# Patient Record
Sex: MALE | Race: WHITE | Employment: FULL TIME | URBAN - METROPOLITAN AREA
[De-identification: names, ages, dates, MRNs, and addresses within clinical notes are randomized per-mention and may not be internally consistent; named-entity substitution may affect disease eponyms.]

---
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
## 2019-01-09 ENCOUNTER — HOSPITAL ENCOUNTER (EMERGENCY)
Age: 19
Discharge: HOME | End: 2019-01-09
Payer: COMMERCIAL

## 2019-01-09 ENCOUNTER — APPOINTMENT (OUTPATIENT)
Dept: GENERAL RADIOLOGY | Age: 19
End: 2019-01-09
Payer: COMMERCIAL

## 2019-01-09 VITALS
OXYGEN SATURATION: 99 % | WEIGHT: 305 LBS | RESPIRATION RATE: 16 BRPM | HEIGHT: 67 IN | DIASTOLIC BLOOD PRESSURE: 62 MMHG | TEMPERATURE: 99.1 F | SYSTOLIC BLOOD PRESSURE: 146 MMHG | BODY MASS INDEX: 47.87 KG/M2 | HEART RATE: 88 BPM

## 2019-01-09 DIAGNOSIS — M79.644 PAIN OF FINGER OF RIGHT HAND: ICD-10-CM

## 2019-01-09 DIAGNOSIS — M77.9 TENDINITIS: ICD-10-CM

## 2019-01-09 DIAGNOSIS — S63.692A OTHER SPRAIN OF RIGHT MIDDLE FINGER, INITIAL ENCOUNTER: Primary | ICD-10-CM

## 2019-01-09 PROCEDURE — 99283 EMERGENCY DEPT VISIT LOW MDM: CPT

## 2019-01-09 PROCEDURE — 73130 X-RAY EXAM OF HAND: CPT

## 2019-01-09 PROCEDURE — 6370000000 HC RX 637 (ALT 250 FOR IP): Performed by: NURSE PRACTITIONER

## 2019-01-09 PROCEDURE — 6360000002 HC RX W HCPCS: Performed by: NURSE PRACTITIONER

## 2019-01-09 PROCEDURE — 96372 THER/PROPH/DIAG INJ SC/IM: CPT

## 2019-01-09 RX ORDER — PREDNISONE 20 MG/1
TABLET ORAL
Qty: 18 TABLET | Refills: 0 | Status: SHIPPED | OUTPATIENT
Start: 2019-01-09 | End: 2019-01-19

## 2019-01-09 RX ORDER — OXYCODONE HYDROCHLORIDE AND ACETAMINOPHEN 5; 325 MG/1; MG/1
1 TABLET ORAL ONCE
Status: COMPLETED | OUTPATIENT
Start: 2019-01-09 | End: 2019-01-09

## 2019-01-09 RX ORDER — KETOROLAC TROMETHAMINE 30 MG/ML
60 INJECTION, SOLUTION INTRAMUSCULAR; INTRAVENOUS ONCE
Status: COMPLETED | OUTPATIENT
Start: 2019-01-09 | End: 2019-01-09

## 2019-01-09 RX ORDER — CYCLOBENZAPRINE HCL 10 MG
10 TABLET ORAL 3 TIMES DAILY PRN
Qty: 15 TABLET | Refills: 0 | Status: SHIPPED | OUTPATIENT
Start: 2019-01-09 | End: 2019-01-19

## 2019-01-09 RX ORDER — KETOROLAC TROMETHAMINE 10 MG/1
10 TABLET, FILM COATED ORAL EVERY 6 HOURS PRN
Qty: 20 TABLET | Refills: 0 | Status: SHIPPED | OUTPATIENT
Start: 2019-01-09 | End: 2019-01-25

## 2019-01-09 RX ORDER — DEXAMETHASONE SODIUM PHOSPHATE 10 MG/ML
10 INJECTION, SOLUTION INTRAMUSCULAR; INTRAVENOUS ONCE
Status: COMPLETED | OUTPATIENT
Start: 2019-01-09 | End: 2019-01-09

## 2019-01-09 RX ADMIN — KETOROLAC TROMETHAMINE 60 MG: 30 INJECTION, SOLUTION INTRAMUSCULAR; INTRAVENOUS at 22:13

## 2019-01-09 RX ADMIN — DEXAMETHASONE SODIUM PHOSPHATE 10 MG: 10 INJECTION, SOLUTION INTRAMUSCULAR; INTRAVENOUS at 22:14

## 2019-01-09 RX ADMIN — OXYCODONE AND ACETAMINOPHEN 1 TABLET: 5; 325 TABLET ORAL at 23:12

## 2019-01-09 ASSESSMENT — PAIN DESCRIPTION - PAIN TYPE: TYPE: ACUTE PAIN

## 2019-01-09 ASSESSMENT — PAIN DESCRIPTION - DESCRIPTORS: DESCRIPTORS: SORE;BURNING

## 2019-01-09 ASSESSMENT — PAIN DESCRIPTION - FREQUENCY: FREQUENCY: CONTINUOUS

## 2019-01-09 ASSESSMENT — PAIN DESCRIPTION - ONSET: ONSET: ON-GOING

## 2019-01-09 ASSESSMENT — PAIN DESCRIPTION - LOCATION: LOCATION: FINGER (COMMENT WHICH ONE)

## 2019-01-09 ASSESSMENT — PAIN DESCRIPTION - ORIENTATION: ORIENTATION: RIGHT

## 2019-01-09 ASSESSMENT — PAIN DESCRIPTION - PROGRESSION: CLINICAL_PROGRESSION: NOT CHANGED

## 2019-01-09 ASSESSMENT — PAIN SCALES - GENERAL
PAINLEVEL_OUTOF10: 9
PAINLEVEL_OUTOF10: 9

## 2019-01-12 PROBLEM — M79.644 PAIN OF FINGER OF RIGHT HAND: Status: ACTIVE | Noted: 2019-01-12

## 2019-01-25 ENCOUNTER — HOSPITAL ENCOUNTER (EMERGENCY)
Age: 19
Discharge: HOME | End: 2019-01-25
Attending: EMERGENCY MEDICINE
Payer: COMMERCIAL

## 2019-01-25 ENCOUNTER — APPOINTMENT (OUTPATIENT)
Dept: CT IMAGING | Age: 19
End: 2019-01-25
Payer: COMMERCIAL

## 2019-01-25 VITALS
WEIGHT: 310 LBS | HEART RATE: 86 BPM | HEIGHT: 67 IN | DIASTOLIC BLOOD PRESSURE: 86 MMHG | SYSTOLIC BLOOD PRESSURE: 124 MMHG | RESPIRATION RATE: 18 BRPM | TEMPERATURE: 97.9 F | BODY MASS INDEX: 48.65 KG/M2 | OXYGEN SATURATION: 98 %

## 2019-01-25 DIAGNOSIS — F43.10 PTSD (POST-TRAUMATIC STRESS DISORDER): ICD-10-CM

## 2019-01-25 DIAGNOSIS — R10.13 ABDOMINAL PAIN, EPIGASTRIC: Primary | ICD-10-CM

## 2019-01-25 LAB
ALBUMIN SERPL-MCNC: 4.4 G/DL (ref 3.5–5.2)
ALP BLD-CCNC: 117 U/L (ref 40–129)
ALT SERPL-CCNC: 42 U/L (ref 0–40)
ANION GAP SERPL CALCULATED.3IONS-SCNC: 12 MMOL/L (ref 7–16)
AST SERPL-CCNC: 27 U/L (ref 0–39)
BASOPHILS ABSOLUTE: 0.08 E9/L (ref 0–0.2)
BASOPHILS RELATIVE PERCENT: 0.5 % (ref 0–2)
BILIRUB SERPL-MCNC: 0.3 MG/DL (ref 0–1.2)
BILIRUBIN URINE: NEGATIVE
BLOOD, URINE: NEGATIVE
BUN BLDV-MCNC: 15 MG/DL (ref 6–20)
CALCIUM SERPL-MCNC: 9.9 MG/DL (ref 8.6–10.2)
CHLORIDE BLD-SCNC: 101 MMOL/L (ref 98–107)
CLARITY: CLEAR
CO2: 28 MMOL/L (ref 22–29)
COLOR: YELLOW
CREAT SERPL-MCNC: 0.8 MG/DL (ref 0.7–1.2)
EOSINOPHILS ABSOLUTE: 0.09 E9/L (ref 0.05–0.5)
EOSINOPHILS RELATIVE PERCENT: 0.6 % (ref 0–6)
GFR AFRICAN AMERICAN: >60
GFR NON-AFRICAN AMERICAN: >60 ML/MIN/1.73
GLUCOSE BLD-MCNC: 93 MG/DL (ref 74–99)
GLUCOSE URINE: NEGATIVE MG/DL
HCT VFR BLD CALC: 47.2 % (ref 37–54)
HEMOGLOBIN: 15.8 G/DL (ref 12.5–16.5)
IMMATURE GRANULOCYTES #: 0.19 E9/L
IMMATURE GRANULOCYTES %: 1.2 % (ref 0–5)
KETONES, URINE: NEGATIVE MG/DL
LEUKOCYTE ESTERASE, URINE: NEGATIVE
LIPASE: 26 U/L (ref 13–60)
LYMPHOCYTES ABSOLUTE: 3.33 E9/L (ref 1.5–4)
LYMPHOCYTES RELATIVE PERCENT: 21.7 % (ref 20–42)
MCH RBC QN AUTO: 29.2 PG (ref 26–35)
MCHC RBC AUTO-ENTMCNC: 33.5 % (ref 32–34.5)
MCV RBC AUTO: 87.1 FL (ref 80–99.9)
MONOCYTES ABSOLUTE: 1.06 E9/L (ref 0.1–0.95)
MONOCYTES RELATIVE PERCENT: 6.9 % (ref 2–12)
NEUTROPHILS ABSOLUTE: 10.57 E9/L (ref 1.8–7.3)
NEUTROPHILS RELATIVE PERCENT: 69.1 % (ref 43–80)
NITRITE, URINE: NEGATIVE
PDW BLD-RTO: 13.2 FL (ref 11.5–15)
PH UA: 6 (ref 5–9)
PLATELET # BLD: 347 E9/L (ref 130–450)
PMV BLD AUTO: 9.6 FL (ref 7–12)
POTASSIUM REFLEX MAGNESIUM: 4.5 MMOL/L (ref 3.5–5)
PROTEIN UA: NEGATIVE MG/DL
RBC # BLD: 5.42 E12/L (ref 3.8–5.8)
SODIUM BLD-SCNC: 141 MMOL/L (ref 132–146)
SPECIFIC GRAVITY UA: 1.02 (ref 1–1.03)
TOTAL PROTEIN: 8.2 G/DL (ref 6.4–8.3)
UROBILINOGEN, URINE: 0.2 E.U./DL
WBC # BLD: 15.3 E9/L (ref 4.5–11.5)

## 2019-01-25 PROCEDURE — 2580000003 HC RX 258: Performed by: EMERGENCY MEDICINE

## 2019-01-25 PROCEDURE — 96375 TX/PRO/DX INJ NEW DRUG ADDON: CPT

## 2019-01-25 PROCEDURE — 99284 EMERGENCY DEPT VISIT MOD MDM: CPT

## 2019-01-25 PROCEDURE — 96374 THER/PROPH/DIAG INJ IV PUSH: CPT

## 2019-01-25 PROCEDURE — 6360000002 HC RX W HCPCS: Performed by: EMERGENCY MEDICINE

## 2019-01-25 PROCEDURE — 36415 COLL VENOUS BLD VENIPUNCTURE: CPT

## 2019-01-25 PROCEDURE — 74176 CT ABD & PELVIS W/O CONTRAST: CPT

## 2019-01-25 PROCEDURE — 83690 ASSAY OF LIPASE: CPT

## 2019-01-25 PROCEDURE — 6370000000 HC RX 637 (ALT 250 FOR IP): Performed by: EMERGENCY MEDICINE

## 2019-01-25 PROCEDURE — 81003 URINALYSIS AUTO W/O SCOPE: CPT

## 2019-01-25 PROCEDURE — 80053 COMPREHEN METABOLIC PANEL: CPT

## 2019-01-25 PROCEDURE — 85025 COMPLETE CBC W/AUTO DIFF WBC: CPT

## 2019-01-25 RX ORDER — KETOROLAC TROMETHAMINE 30 MG/ML
30 INJECTION, SOLUTION INTRAMUSCULAR; INTRAVENOUS ONCE
Status: COMPLETED | OUTPATIENT
Start: 2019-01-25 | End: 2019-01-25

## 2019-01-25 RX ORDER — PANTOPRAZOLE SODIUM 40 MG/1
40 TABLET, DELAYED RELEASE ORAL
Status: DISCONTINUED | OUTPATIENT
Start: 2019-01-26 | End: 2019-01-25

## 2019-01-25 RX ORDER — PROMETHAZINE HYDROCHLORIDE 25 MG/1
25 TABLET ORAL EVERY 6 HOURS PRN
Qty: 10 TABLET | Refills: 0 | Status: SHIPPED | OUTPATIENT
Start: 2019-01-25 | End: 2019-02-01

## 2019-01-25 RX ORDER — ONDANSETRON 2 MG/ML
4 INJECTION INTRAMUSCULAR; INTRAVENOUS EVERY 6 HOURS PRN
Status: DISCONTINUED | OUTPATIENT
Start: 2019-01-25 | End: 2019-01-25 | Stop reason: HOSPADM

## 2019-01-25 RX ORDER — 0.9 % SODIUM CHLORIDE 0.9 %
1000 INTRAVENOUS SOLUTION INTRAVENOUS ONCE
Status: COMPLETED | OUTPATIENT
Start: 2019-01-25 | End: 2019-01-25

## 2019-01-25 RX ORDER — PANTOPRAZOLE SODIUM 40 MG/1
40 TABLET, DELAYED RELEASE ORAL ONCE
Status: COMPLETED | OUTPATIENT
Start: 2019-01-25 | End: 2019-01-25

## 2019-01-25 RX ORDER — SUCRALFATE ORAL 1 G/10ML
1 SUSPENSION ORAL 4 TIMES DAILY
Qty: 1200 ML | Refills: 3 | Status: SHIPPED | OUTPATIENT
Start: 2019-01-25 | End: 2019-03-17 | Stop reason: ALTCHOICE

## 2019-01-25 RX ADMIN — SODIUM CHLORIDE 1000 ML: 9 INJECTION, SOLUTION INTRAVENOUS at 20:07

## 2019-01-25 RX ADMIN — LIDOCAINE HYDROCHLORIDE: 20 SOLUTION ORAL; TOPICAL at 19:00

## 2019-01-25 RX ADMIN — PANTOPRAZOLE SODIUM 40 MG: 40 TABLET, DELAYED RELEASE ORAL at 19:00

## 2019-01-25 RX ADMIN — KETOROLAC TROMETHAMINE 30 MG: 30 INJECTION, SOLUTION INTRAMUSCULAR; INTRAVENOUS at 20:07

## 2019-01-25 RX ADMIN — ONDANSETRON 4 MG: 2 INJECTION INTRAMUSCULAR; INTRAVENOUS at 20:07

## 2019-01-25 ASSESSMENT — PAIN SCALES - GENERAL
PAINLEVEL_OUTOF10: 7
PAINLEVEL_OUTOF10: 7

## 2019-01-25 ASSESSMENT — PAIN DESCRIPTION - LOCATION: LOCATION: ABDOMEN

## 2019-01-25 ASSESSMENT — PAIN DESCRIPTION - ONSET: ONSET: GRADUAL

## 2019-01-25 ASSESSMENT — PAIN DESCRIPTION - ORIENTATION: ORIENTATION: RIGHT;LEFT;UPPER;LOWER

## 2019-01-25 ASSESSMENT — PAIN DESCRIPTION - FREQUENCY: FREQUENCY: CONTINUOUS

## 2019-01-25 ASSESSMENT — PAIN DESCRIPTION - PROGRESSION: CLINICAL_PROGRESSION: GRADUALLY WORSENING

## 2019-03-17 ENCOUNTER — APPOINTMENT (OUTPATIENT)
Dept: CT IMAGING | Age: 19
End: 2019-03-17
Payer: COMMERCIAL

## 2019-03-17 ENCOUNTER — HOSPITAL ENCOUNTER (EMERGENCY)
Age: 19
Discharge: HOME | End: 2019-03-17
Payer: COMMERCIAL

## 2019-03-17 VITALS
HEIGHT: 67 IN | WEIGHT: 307 LBS | DIASTOLIC BLOOD PRESSURE: 96 MMHG | BODY MASS INDEX: 48.18 KG/M2 | RESPIRATION RATE: 16 BRPM | OXYGEN SATURATION: 99 % | TEMPERATURE: 98.1 F | SYSTOLIC BLOOD PRESSURE: 140 MMHG | HEART RATE: 100 BPM

## 2019-03-17 LAB
ALBUMIN SERPL-MCNC: 4.5 G/DL (ref 3.5–5.2)
ALP BLD-CCNC: 115 U/L (ref 40–129)
ALT SERPL-CCNC: 47 U/L (ref 0–40)
ANION GAP SERPL CALCULATED.3IONS-SCNC: 14 MMOL/L (ref 7–16)
AST SERPL-CCNC: 30 U/L (ref 0–39)
BASOPHILS ABSOLUTE: 0.05 E9/L (ref 0–0.2)
BASOPHILS RELATIVE PERCENT: 0.4 % (ref 0–2)
BILIRUB SERPL-MCNC: 0.3 MG/DL (ref 0–1.2)
BILIRUBIN URINE: NEGATIVE
BLOOD, URINE: NEGATIVE
BUN BLDV-MCNC: 10 MG/DL (ref 6–20)
CALCIUM SERPL-MCNC: 9.5 MG/DL (ref 8.6–10.2)
CHLORIDE BLD-SCNC: 105 MMOL/L (ref 98–107)
CLARITY: CLEAR
CO2: 24 MMOL/L (ref 22–29)
COLOR: YELLOW
CREAT SERPL-MCNC: 0.9 MG/DL (ref 0.7–1.2)
EOSINOPHILS ABSOLUTE: 0.05 E9/L (ref 0.05–0.5)
EOSINOPHILS RELATIVE PERCENT: 0.4 % (ref 0–6)
GFR AFRICAN AMERICAN: >60
GFR NON-AFRICAN AMERICAN: >60 ML/MIN/1.73
GLUCOSE BLD-MCNC: 102 MG/DL (ref 74–99)
GLUCOSE URINE: NEGATIVE MG/DL
HCT VFR BLD CALC: 45.8 % (ref 37–54)
HEMOGLOBIN: 15.2 G/DL (ref 12.5–16.5)
IMMATURE GRANULOCYTES #: 0.15 E9/L
IMMATURE GRANULOCYTES %: 1.1 % (ref 0–5)
KETONES, URINE: NEGATIVE MG/DL
LACTIC ACID: 1.9 MMOL/L (ref 0.5–2.2)
LEUKOCYTE ESTERASE, URINE: NEGATIVE
LIPASE: 36 U/L (ref 13–60)
LYMPHOCYTES ABSOLUTE: 2.5 E9/L (ref 1.5–4)
LYMPHOCYTES RELATIVE PERCENT: 18.7 % (ref 20–42)
MCH RBC QN AUTO: 29.3 PG (ref 26–35)
MCHC RBC AUTO-ENTMCNC: 33.2 % (ref 32–34.5)
MCV RBC AUTO: 88.2 FL (ref 80–99.9)
MONOCYTES ABSOLUTE: 1.11 E9/L (ref 0.1–0.95)
MONOCYTES RELATIVE PERCENT: 8.3 % (ref 2–12)
NEUTROPHILS ABSOLUTE: 9.5 E9/L (ref 1.8–7.3)
NEUTROPHILS RELATIVE PERCENT: 71.1 % (ref 43–80)
NITRITE, URINE: NEGATIVE
PDW BLD-RTO: 13.6 FL (ref 11.5–15)
PH UA: 6 (ref 5–9)
PLATELET # BLD: 340 E9/L (ref 130–450)
PMV BLD AUTO: 9.9 FL (ref 7–12)
POTASSIUM SERPL-SCNC: 4 MMOL/L (ref 3.5–5)
PROTEIN UA: NEGATIVE MG/DL
RBC # BLD: 5.19 E12/L (ref 3.8–5.8)
SODIUM BLD-SCNC: 143 MMOL/L (ref 132–146)
SPECIFIC GRAVITY UA: 1.02 (ref 1–1.03)
TOTAL PROTEIN: 8 G/DL (ref 6.4–8.3)
UROBILINOGEN, URINE: 0.2 E.U./DL
WBC # BLD: 13.4 E9/L (ref 4.5–11.5)

## 2019-03-17 PROCEDURE — 36415 COLL VENOUS BLD VENIPUNCTURE: CPT

## 2019-03-17 PROCEDURE — 6360000002 HC RX W HCPCS: Performed by: NURSE PRACTITIONER

## 2019-03-17 PROCEDURE — 85025 COMPLETE CBC W/AUTO DIFF WBC: CPT

## 2019-03-17 PROCEDURE — 81003 URINALYSIS AUTO W/O SCOPE: CPT

## 2019-03-17 PROCEDURE — 6360000004 HC RX CONTRAST MEDICATION: Performed by: RADIOLOGY

## 2019-03-17 PROCEDURE — 74177 CT ABD & PELVIS W/CONTRAST: CPT

## 2019-03-17 PROCEDURE — 80053 COMPREHEN METABOLIC PANEL: CPT

## 2019-03-17 PROCEDURE — 96372 THER/PROPH/DIAG INJ SC/IM: CPT

## 2019-03-17 PROCEDURE — 83605 ASSAY OF LACTIC ACID: CPT

## 2019-03-17 PROCEDURE — 83690 ASSAY OF LIPASE: CPT

## 2019-03-17 PROCEDURE — 2580000003 HC RX 258: Performed by: RADIOLOGY

## 2019-03-17 PROCEDURE — 99284 EMERGENCY DEPT VISIT MOD MDM: CPT

## 2019-03-17 RX ORDER — ONDANSETRON 4 MG/1
4 TABLET, ORALLY DISINTEGRATING ORAL EVERY 8 HOURS PRN
Qty: 10 TABLET | Refills: 0 | Status: SHIPPED | OUTPATIENT
Start: 2019-03-17 | End: 2019-04-21

## 2019-03-17 RX ORDER — DICYCLOMINE HYDROCHLORIDE 10 MG/ML
20 INJECTION INTRAMUSCULAR ONCE
Status: COMPLETED | OUTPATIENT
Start: 2019-03-17 | End: 2019-03-17

## 2019-03-17 RX ORDER — SODIUM CHLORIDE 0.9 % (FLUSH) 0.9 %
10 SYRINGE (ML) INJECTION PRN
Status: DISCONTINUED | OUTPATIENT
Start: 2019-03-17 | End: 2019-03-17 | Stop reason: HOSPADM

## 2019-03-17 RX ORDER — DICYCLOMINE HYDROCHLORIDE 10 MG/1
20 CAPSULE ORAL
Qty: 15 CAPSULE | Refills: 0 | Status: SHIPPED | OUTPATIENT
Start: 2019-03-17 | End: 2019-04-21

## 2019-03-17 RX ADMIN — IOPAMIDOL 100 ML: 755 INJECTION, SOLUTION INTRAVENOUS at 19:56

## 2019-03-17 RX ADMIN — Medication 10 ML: at 19:56

## 2019-03-17 RX ADMIN — DICYCLOMINE HYDROCHLORIDE 20 MG: 20 INJECTION, SOLUTION INTRAMUSCULAR at 18:48

## 2019-03-17 ASSESSMENT — PAIN DESCRIPTION - FREQUENCY: FREQUENCY: INTERMITTENT

## 2019-03-17 ASSESSMENT — PAIN SCALES - GENERAL: PAINLEVEL_OUTOF10: 8

## 2019-03-17 ASSESSMENT — PAIN DESCRIPTION - ORIENTATION: ORIENTATION: UPPER

## 2019-03-17 ASSESSMENT — PAIN DESCRIPTION - LOCATION: LOCATION: ABDOMEN

## 2019-03-17 ASSESSMENT — PAIN - FUNCTIONAL ASSESSMENT: PAIN_FUNCTIONAL_ASSESSMENT: PREVENTS OR INTERFERES SOME ACTIVE ACTIVITIES AND ADLS

## 2019-03-17 ASSESSMENT — PAIN DESCRIPTION - PAIN TYPE: TYPE: ACUTE PAIN

## 2019-03-17 ASSESSMENT — PAIN DESCRIPTION - DESCRIPTORS: DESCRIPTORS: BURNING

## 2019-03-17 NOTE — ED PROVIDER NOTES
patent. · Neck:  Supple. No lymphadenopathy. · Respiratory: Lungs Clear to auscultation and breath sounds equal.  · CV:  Regular rate and rhythm. · GI:  General Appearance: normal.         Bowel sounds: normal bowel sounds. Distension:  None. Tenderness: mild tenderness is present in the entire abdomen, no rebound tenderness, no guarding, abdominal rigidity is absent. Liver: non-palpable and non-tender. Spleen:  non-palpable and non-tender. Pulsatile Mass: absent. Hernia:  no inguinal or femoral hernias noted. · Back: CVA Tenderness: No.  · Integument:  Normal turgor. Warm, dry, without visible rash, unless noted elsewhere. · Neurological:  Orientation age-appropriate. Motor functions intact.     Lab / Imaging Results   (All laboratory and radiology results have been personally reviewed by myself)  Labs:  Results for orders placed or performed during the hospital encounter of 03/17/19   Urinalysis   Result Value Ref Range    Color, UA Yellow Straw/Yellow    Clarity, UA Clear Clear    Glucose, Ur Negative Negative mg/dL    Bilirubin Urine Negative Negative    Ketones, Urine Negative Negative mg/dL    Specific Gravity, UA 1.025 1.005 - 1.030    Blood, Urine Negative Negative    pH, UA 6.0 5.0 - 9.0    Protein, UA Negative Negative mg/dL    Urobilinogen, Urine 0.2 <2.0 E.U./dL    Nitrite, Urine Negative Negative    Leukocyte Esterase, Urine Negative Negative   Comprehensive Metabolic Panel   Result Value Ref Range    Sodium 143 132 - 146 mmol/L    Potassium 4.0 3.5 - 5.0 mmol/L    Chloride 105 98 - 107 mmol/L    CO2 24 22 - 29 mmol/L    Anion Gap 14 7 - 16 mmol/L    Glucose 102 (H) 74 - 99 mg/dL    BUN 10 6 - 20 mg/dL    CREATININE 0.9 0.7 - 1.2 mg/dL    GFR Non-African American >60 >=60 mL/min/1.73    GFR African American >60     Calcium 9.5 8.6 - 10.2 mg/dL    Total Protein 8.0 6.4 - 8.3 g/dL    Alb 4.5 3.5 - for their symptoms with moderate improvement. Patient continues to be non-toxic on re-evaluation. Ct scan was negative for acute findings. Discussed this may be a viral event and use clear liquid diet and advance as tolerated in the 2 days. Abdominal examination benign on re evaluation. Findings were discussed with the patient and reasons to immediately return to the ED were articulated to them. They will follow-up with their PMD.    Counseling:   I have spoken with the mother and patient and discussed todays results, in addition to providing specific details for the plan of care and counseling regarding the diagnosis and prognosis and are agreeable with the plan. Assessment      1. Generalized abdominal cramping    2. Nausea      This patient's ED course included: a personal history and physicial examination, re-evaluation prior to disposition, multiple bedside re-evaluations, IV medications and complex medical decision making and emergency management  This patient has remained hemodynamically stable, improved and been closely monitored during their ED course. Plan   Discharge to home. Patient condition is good. New Medications     Discharge Medication List as of 3/17/2019  8:58 PM      START taking these medications    Details   ondansetron (ZOFRAN ODT) 4 MG disintegrating tablet Take 1 tablet by mouth every 8 hours as needed for Nausea or Vomiting, Disp-10 tablet, R-0Print      dicyclomine (BENTYL) 10 MG capsule Take 2 capsules by mouth 4 times daily (before meals and nightly), Disp-15 capsule, R-nonePrint           Electronically signed by Alto Bamberger, APRN - CNP   DD: 3/17/19  **This report was transcribed using voice recognition software. Every effort was made to ensure accuracy; however, inadvertent computerized transcription errors may be present.   END OF PROVIDER NOTE      Alto Bamberger, APRN - CNP  03/19/19 9901

## 2019-04-21 ENCOUNTER — HOSPITAL ENCOUNTER (EMERGENCY)
Age: 19
Discharge: HOME | End: 2019-04-21
Attending: EMERGENCY MEDICINE
Payer: COMMERCIAL

## 2019-04-21 VITALS
SYSTOLIC BLOOD PRESSURE: 134 MMHG | RESPIRATION RATE: 16 BRPM | OXYGEN SATURATION: 99 % | TEMPERATURE: 98.5 F | BODY MASS INDEX: 48.81 KG/M2 | HEIGHT: 67 IN | DIASTOLIC BLOOD PRESSURE: 82 MMHG | WEIGHT: 311 LBS | HEART RATE: 88 BPM

## 2019-04-21 DIAGNOSIS — J20.9 ACUTE BRONCHITIS, UNSPECIFIED ORGANISM: Primary | ICD-10-CM

## 2019-04-21 PROCEDURE — 99283 EMERGENCY DEPT VISIT LOW MDM: CPT

## 2019-04-21 RX ORDER — AZITHROMYCIN 250 MG/1
TABLET, FILM COATED ORAL
Qty: 1 PACKET | Refills: 0 | Status: SHIPPED | OUTPATIENT
Start: 2019-04-21 | End: 2019-05-01

## 2019-04-21 RX ORDER — BENZONATATE 100 MG/1
100 CAPSULE ORAL 3 TIMES DAILY PRN
Qty: 21 CAPSULE | Refills: 0 | Status: SHIPPED | OUTPATIENT
Start: 2019-04-21 | End: 2019-04-28

## 2019-10-03 ENCOUNTER — HOSPITAL ENCOUNTER (EMERGENCY)
Age: 19
Discharge: HOME | End: 2019-10-03
Payer: COMMERCIAL

## 2019-10-03 ENCOUNTER — APPOINTMENT (OUTPATIENT)
Dept: CT IMAGING | Age: 19
End: 2019-10-03
Payer: COMMERCIAL

## 2019-10-03 VITALS
RESPIRATION RATE: 18 BRPM | WEIGHT: 310 LBS | HEIGHT: 67 IN | HEART RATE: 88 BPM | SYSTOLIC BLOOD PRESSURE: 145 MMHG | BODY MASS INDEX: 48.65 KG/M2 | OXYGEN SATURATION: 97 % | DIASTOLIC BLOOD PRESSURE: 80 MMHG | TEMPERATURE: 98.2 F

## 2019-10-03 DIAGNOSIS — R51.9 ACUTE NONINTRACTABLE HEADACHE, UNSPECIFIED HEADACHE TYPE: ICD-10-CM

## 2019-10-03 DIAGNOSIS — S09.90XA CLOSED HEAD INJURY, INITIAL ENCOUNTER: Primary | ICD-10-CM

## 2019-10-03 PROCEDURE — 99284 EMERGENCY DEPT VISIT MOD MDM: CPT

## 2019-10-03 PROCEDURE — 70450 CT HEAD/BRAIN W/O DYE: CPT

## 2019-10-03 PROCEDURE — 6370000000 HC RX 637 (ALT 250 FOR IP): Performed by: PHYSICIAN ASSISTANT

## 2019-10-03 PROCEDURE — 96372 THER/PROPH/DIAG INJ SC/IM: CPT

## 2019-10-03 PROCEDURE — 6360000002 HC RX W HCPCS: Performed by: PHYSICIAN ASSISTANT

## 2019-10-03 RX ORDER — KETOROLAC TROMETHAMINE 30 MG/ML
30 INJECTION, SOLUTION INTRAMUSCULAR; INTRAVENOUS ONCE
Status: COMPLETED | OUTPATIENT
Start: 2019-10-03 | End: 2019-10-03

## 2019-10-03 RX ORDER — ACETAMINOPHEN 500 MG
1000 TABLET ORAL ONCE
Status: COMPLETED | OUTPATIENT
Start: 2019-10-03 | End: 2019-10-03

## 2019-10-03 RX ORDER — BUTALBITAL, ASPIRIN, AND CAFFEINE 325; 50; 40 MG/1; MG/1; MG/1
1 CAPSULE ORAL EVERY 6 HOURS PRN
Qty: 28 CAPSULE | Refills: 0 | Status: SHIPPED | OUTPATIENT
Start: 2019-10-03 | End: 2020-02-22

## 2019-10-03 RX ADMIN — KETOROLAC TROMETHAMINE 30 MG: 30 INJECTION, SOLUTION INTRAMUSCULAR; INTRAVENOUS at 12:47

## 2019-10-03 RX ADMIN — ACETAMINOPHEN 1000 MG: 500 TABLET ORAL at 12:47

## 2019-10-03 ASSESSMENT — PAIN SCALES - GENERAL
PAINLEVEL_OUTOF10: 7
PAINLEVEL_OUTOF10: 7

## 2019-10-03 ASSESSMENT — PAIN DESCRIPTION - FREQUENCY: FREQUENCY: CONTINUOUS

## 2019-10-03 ASSESSMENT — PAIN DESCRIPTION - DESCRIPTORS: DESCRIPTORS: ACHING;CONSTANT

## 2019-10-03 ASSESSMENT — PAIN DESCRIPTION - LOCATION: LOCATION: HEAD

## 2019-12-29 ENCOUNTER — HOSPITAL ENCOUNTER (EMERGENCY)
Age: 19
Discharge: HOME | End: 2019-12-29
Payer: COMMERCIAL

## 2019-12-29 ENCOUNTER — APPOINTMENT (OUTPATIENT)
Dept: GENERAL RADIOLOGY | Age: 19
End: 2019-12-29
Payer: COMMERCIAL

## 2019-12-29 VITALS
OXYGEN SATURATION: 96 % | HEART RATE: 94 BPM | HEIGHT: 67 IN | RESPIRATION RATE: 18 BRPM | WEIGHT: 315 LBS | DIASTOLIC BLOOD PRESSURE: 78 MMHG | TEMPERATURE: 99.1 F | SYSTOLIC BLOOD PRESSURE: 156 MMHG | BODY MASS INDEX: 49.44 KG/M2

## 2019-12-29 PROCEDURE — 73130 X-RAY EXAM OF HAND: CPT

## 2019-12-29 PROCEDURE — 6370000000 HC RX 637 (ALT 250 FOR IP): Performed by: NURSE PRACTITIONER

## 2019-12-29 PROCEDURE — 99283 EMERGENCY DEPT VISIT LOW MDM: CPT

## 2019-12-29 RX ORDER — IBUPROFEN 400 MG/1
400 TABLET ORAL ONCE
Status: COMPLETED | OUTPATIENT
Start: 2019-12-29 | End: 2019-12-29

## 2019-12-29 RX ADMIN — IBUPROFEN 400 MG: 400 TABLET, FILM COATED ORAL at 21:33

## 2019-12-29 ASSESSMENT — PAIN DESCRIPTION - LOCATION: LOCATION: HAND

## 2019-12-29 ASSESSMENT — PAIN DESCRIPTION - ORIENTATION: ORIENTATION: RIGHT

## 2019-12-29 ASSESSMENT — PAIN SCALES - GENERAL
PAINLEVEL_OUTOF10: 6
PAINLEVEL_OUTOF10: 6

## 2019-12-29 ASSESSMENT — PAIN DESCRIPTION - PAIN TYPE: TYPE: ACUTE PAIN

## 2019-12-30 NOTE — ED PROVIDER NOTES
deficit: none. Capillary refill: normal.  Fingers:  all            Tenderness:  none. Swelling: None. Deformity: no.              ROM: full range of motion. Skin:  no erythema, rash or wounds noted. Wrist:               Tenderness:  none. Swelling: None. Deformity: no.             ROM: full range of motion. Skin:  no erythema, rash or wounds noted. Lymphatics: No lymphangitis or adenopathy noted. Neurological:  Oriented. Motor functions intact. t. Lab / Imaging Results   (All laboratory and radiology results have been personally reviewed by myself)  Labs:  No results found for this visit on 12/29/19. Imaging: All Radiology results interpreted by Radiologist unless otherwise noted. XR HAND RIGHT (MIN 3 VIEWS)   Final Result   No acute findings. ED Course / Medical Decision Making     Medications   ibuprofen (ADVIL;MOTRIN) tablet 400 mg (400 mg Oral Given 12/29/19 2133)          MDM:    Films were obtained and are negative for fracture. Plan is subsequently for symptom control, limited use and  immobilization with appropriate outpatient follow-up. Counseling: The emergency provider has spoken with the patient and discussed todays results, in addition to providing specific details for the plan of care and counseling regarding the diagnosis and prognosis. Questions are answered at this time and they are agreeable with the plan. Assessment      1. Contusion of right hand, initial encounter      Plan   Discharge to home  Patient condition is good    New Medications     Discharge Medication List as of 12/29/2019 10:02 PM        Electronically signed by CATIE Ruelas - CNP   DD: 12/29/19  **This report was transcribed using voice recognition software. Every effort was made to ensure accuracy; however, inadvertent computerized transcription errors may be present.   END OF ED PROVIDER NOTE     CATIE Rush - CNP  12/29/19 7692

## 2020-01-24 ENCOUNTER — HOSPITAL ENCOUNTER (EMERGENCY)
Age: 20
Discharge: HOME | End: 2020-01-24
Attending: EMERGENCY MEDICINE
Payer: COMMERCIAL

## 2020-01-24 ENCOUNTER — APPOINTMENT (OUTPATIENT)
Dept: GENERAL RADIOLOGY | Age: 20
End: 2020-01-24
Payer: COMMERCIAL

## 2020-01-24 VITALS
WEIGHT: 315 LBS | TEMPERATURE: 99 F | DIASTOLIC BLOOD PRESSURE: 80 MMHG | HEART RATE: 99 BPM | OXYGEN SATURATION: 99 % | RESPIRATION RATE: 18 BRPM | BODY MASS INDEX: 49.44 KG/M2 | HEIGHT: 67 IN | SYSTOLIC BLOOD PRESSURE: 110 MMHG

## 2020-01-24 LAB
ANION GAP SERPL CALCULATED.3IONS-SCNC: 12 MMOL/L (ref 7–16)
BASOPHILS ABSOLUTE: 0.04 E9/L (ref 0–0.2)
BASOPHILS RELATIVE PERCENT: 0.3 % (ref 0–2)
BUN BLDV-MCNC: 7 MG/DL (ref 6–20)
CALCIUM SERPL-MCNC: 9.1 MG/DL (ref 8.6–10.2)
CHLORIDE BLD-SCNC: 99 MMOL/L (ref 98–107)
CO2: 23 MMOL/L (ref 22–29)
CREAT SERPL-MCNC: 0.8 MG/DL (ref 0.7–1.2)
EOSINOPHILS ABSOLUTE: 0.04 E9/L (ref 0.05–0.5)
EOSINOPHILS RELATIVE PERCENT: 0.3 % (ref 0–6)
GFR AFRICAN AMERICAN: >60
GFR NON-AFRICAN AMERICAN: >60 ML/MIN/1.73
GLUCOSE BLD-MCNC: 99 MG/DL (ref 74–99)
HCT VFR BLD CALC: 47.2 % (ref 37–54)
HEMOGLOBIN: 15 G/DL (ref 12.5–16.5)
IMMATURE GRANULOCYTES #: 0.1 E9/L
IMMATURE GRANULOCYTES %: 0.7 % (ref 0–5)
INFLUENZA A BY PCR: NOT DETECTED
INFLUENZA B BY PCR: NOT DETECTED
LACTIC ACID: 1 MMOL/L (ref 0.5–2.2)
LYMPHOCYTES ABSOLUTE: 1.49 E9/L (ref 1.5–4)
LYMPHOCYTES RELATIVE PERCENT: 10.2 % (ref 20–42)
MCH RBC QN AUTO: 28.2 PG (ref 26–35)
MCHC RBC AUTO-ENTMCNC: 31.8 % (ref 32–34.5)
MCV RBC AUTO: 88.9 FL (ref 80–99.9)
MONOCYTES ABSOLUTE: 1.4 E9/L (ref 0.1–0.95)
MONOCYTES RELATIVE PERCENT: 9.5 % (ref 2–12)
NEUTROPHILS ABSOLUTE: 11.59 E9/L (ref 1.8–7.3)
NEUTROPHILS RELATIVE PERCENT: 79 % (ref 43–80)
PDW BLD-RTO: 13.2 FL (ref 11.5–15)
PLATELET # BLD: 248 E9/L (ref 130–450)
PMV BLD AUTO: 10.1 FL (ref 7–12)
POTASSIUM REFLEX MAGNESIUM: 3.6 MMOL/L (ref 3.5–5)
RBC # BLD: 5.31 E12/L (ref 3.8–5.8)
SODIUM BLD-SCNC: 134 MMOL/L (ref 132–146)
WBC # BLD: 14.7 E9/L (ref 4.5–11.5)

## 2020-01-24 PROCEDURE — 71046 X-RAY EXAM CHEST 2 VIEWS: CPT

## 2020-01-24 PROCEDURE — 36415 COLL VENOUS BLD VENIPUNCTURE: CPT

## 2020-01-24 PROCEDURE — 85025 COMPLETE CBC W/AUTO DIFF WBC: CPT

## 2020-01-24 PROCEDURE — 99283 EMERGENCY DEPT VISIT LOW MDM: CPT

## 2020-01-24 PROCEDURE — 83605 ASSAY OF LACTIC ACID: CPT

## 2020-01-24 PROCEDURE — 80048 BASIC METABOLIC PNL TOTAL CA: CPT

## 2020-01-24 PROCEDURE — 87502 INFLUENZA DNA AMP PROBE: CPT

## 2020-01-24 PROCEDURE — 6370000000 HC RX 637 (ALT 250 FOR IP): Performed by: EMERGENCY MEDICINE

## 2020-01-24 RX ORDER — DOXYCYCLINE HYCLATE 100 MG/1
100 CAPSULE ORAL ONCE
Status: COMPLETED | OUTPATIENT
Start: 2020-01-24 | End: 2020-01-24

## 2020-01-24 RX ORDER — 0.9 % SODIUM CHLORIDE 0.9 %
1000 INTRAVENOUS SOLUTION INTRAVENOUS ONCE
Status: DISCONTINUED | OUTPATIENT
Start: 2020-01-24 | End: 2020-01-24

## 2020-01-24 RX ORDER — DOXYCYCLINE HYCLATE 100 MG
100 TABLET ORAL 2 TIMES DAILY
Qty: 14 TABLET | Refills: 0 | Status: SHIPPED | OUTPATIENT
Start: 2020-01-24 | End: 2020-01-31

## 2020-01-24 RX ADMIN — DOXYCYCLINE HYCLATE 100 MG: 100 CAPSULE ORAL at 15:51

## 2020-01-24 NOTE — ED PROVIDER NOTES
HPI:  1/24/20, Time: 1:15 PM         Katrnia Kc is a 20 y.o. male presenting to the ED for cough, fever, and chills, beginning about 3 days ago. The complaint has been persistent, moderate in severity, and worsened by nothing. Patient presents to the emergency department by EMS for about 3 days of cough, congestion, fever, and chills. He has not identified any relieving remitting factors. He states that his highest temperature at home was 102.3 Fahrenheit orally. He did take some Tylenol prior to arrival.  He states that he has a history of asthma and his breathing seems slightly worse than usual but he is in no respiratory distress. He denies any chest pain at rest but he has little bit of chest discomfort when coughing. He denies abdominal pain, nausea, vomiting, diarrhea, or changes in urination. He did get a flu shot this year. Review of Systems:   Pertinent positives and negatives are stated within HPI, all other systems reviewed and are negative.          --------------------------------------------- PAST HISTORY ---------------------------------------------  Past Medical History:  has a past medical history of Arthritis and Asperger syndrome. Past Surgical History:  has a past surgical history that includes Tonsillectomy; Adenoidectomy; and [place] tooth extraction. Social History:  reports that he has never smoked. He has never used smokeless tobacco. He reports that he does not drink alcohol or use drugs. Family History: family history is not on file. The patients home medications have been reviewed. Allergies: Patient has no known allergies.     -------------------------------------------------- RESULTS -------------------------------------------------  All laboratory and radiology results have been personally reviewed by myself   LABS:  Results for orders placed or performed during the hospital encounter of 01/24/20   Rapid influenza A/B antigens   Result Value Ref Range Influenza A by PCR Not Detected Not Detected    Influenza B by PCR Not Detected Not Detected   CBC Auto Differential   Result Value Ref Range    WBC 14.7 (H) 4.5 - 11.5 E9/L    RBC 5.31 3.80 - 5.80 E12/L    Hemoglobin 15.0 12.5 - 16.5 g/dL    Hematocrit 47.2 37.0 - 54.0 %    MCV 88.9 80.0 - 99.9 fL    MCH 28.2 26.0 - 35.0 pg    MCHC 31.8 (L) 32.0 - 34.5 %    RDW 13.2 11.5 - 15.0 fL    Platelets 278 843 - 299 E9/L    MPV 10.1 7.0 - 12.0 fL    Neutrophils % 79.0 43.0 - 80.0 %    Immature Granulocytes % 0.7 0.0 - 5.0 %    Lymphocytes % 10.2 (L) 20.0 - 42.0 %    Monocytes % 9.5 2.0 - 12.0 %    Eosinophils % 0.3 0.0 - 6.0 %    Basophils % 0.3 0.0 - 2.0 %    Neutrophils Absolute 11.59 (H) 1.80 - 7.30 E9/L    Immature Granulocytes # 0.10 E9/L    Lymphocytes Absolute 1.49 (L) 1.50 - 4.00 E9/L    Monocytes Absolute 1.40 (H) 0.10 - 0.95 E9/L    Eosinophils Absolute 0.04 (L) 0.05 - 0.50 E9/L    Basophils Absolute 0.04 0.00 - 0.20 K6/Z   Basic Metabolic Panel w/ Reflex to MG   Result Value Ref Range    Sodium 134 132 - 146 mmol/L    Potassium reflex Magnesium 3.6 3.5 - 5.0 mmol/L    Chloride 99 98 - 107 mmol/L    CO2 23 22 - 29 mmol/L    Anion Gap 12 7 - 16 mmol/L    Glucose 99 74 - 99 mg/dL    BUN 7 6 - 20 mg/dL    CREATININE 0.8 0.7 - 1.2 mg/dL    GFR Non-African American >60 >=60 mL/min/1.73    GFR African American >60     Calcium 9.1 8.6 - 10.2 mg/dL   Lactic Acid, Plasma   Result Value Ref Range    Lactic Acid 1.0 0.5 - 2.2 mmol/L       RADIOLOGY:  Interpreted by Radiologist.  XR CHEST STANDARD (2 VW)   Final Result   Organizing left lower lobe pneumonia.          ------------------------- NURSING NOTES AND VITALS REVIEWED ---------------------------   The nursing notes within the ED encounter and vital signs as below have been reviewed.    /80   Pulse 99   Temp 99 °F (37.2 °C) (Oral)   Resp 18   Ht 5' 7\" (1.702 m)   Wt (!) 320 lb (145.2 kg)   SpO2 99%   BMI 50.12 kg/m²   Oxygen Saturation Interpretation: Normal      ---------------------------------------------------PHYSICAL EXAM--------------------------------------      Constitutional/General: Alert and oriented x3, well appearing, non toxic in NAD  Head: Normocephalic and atraumatic  Eyes: PERRL, EOMI  Mouth: Oropharynx clear, handling secretions, no trismus  Neck: Supple, full ROM,   Pulmonary: Lungs largely clear to auscultation with an extremely faint and expiratory wheeze. No rales, rhonchi, or stridor. Not in respiratory distress. Cardiovascular:  Regular rate and rhythm, no murmurs, gallops, or rubs. 2+ distal pulses  Abdomen: Soft, non tender, non distended,   Extremities: Moves all extremities x 4. Warm and well perfused  Skin: warm and dry without rash  Neurologic: GCS 15,  Psych: Normal Affect      ------------------------------ ED COURSE/MEDICAL DECISION MAKING----------------------  Medications   doxycycline hyclate (VIBRAMYCIN) capsule 100 mg (100 mg Oral Given 1/24/20 1551)         ED COURSE:  ED Course as of Jan 24 2000 Fri Jan 24, 2020 2000 Patient found to have a left lower lobe pneumonia on chest x-ray. His white blood cell count is elevated but the rest of his blood counts are stable and his electrolytes are normal.  His lactic acid is 1.0 and he is negative for influenza. Patient is sitting comfortably in bed and is completely unlabored breathing. Patient was started on doxycycline and he is instructed to follow-up with his family doctor. He voices understanding and is agreeable with this plan. [BM]      ED Course User Index  [BM] Verito Landis DO       Medical Decision Making:    Patient presents to the emergency department for about 3 days of cough, congestion, fever, and chills. He is found to be afebrile and saturating 99% on room air and his breathing is unlabored and comfortable. He does have very faint and expiratory wheezing with a history of asthma. He is concerned about having influenza.   Patient will be some blood work drawn as well as be checked for influenza and have a chest x-ray obtained. Counseling: The emergency provider has spoken with the patient and discussed todays results, in addition to providing specific details for the plan of care and counseling regarding the diagnosis and prognosis. Questions are answered at this time and they are agreeable with the plan.      --------------------------------- IMPRESSION AND DISPOSITION ---------------------------------    IMPRESSION  1.  Pneumonia of left lower lobe due to infectious organism [facility])        DISPOSITION  Disposition: Discharge to home  Patient condition is stable       Sienna Singh DO  01/24/20 2001

## 2020-02-22 ENCOUNTER — HOSPITAL ENCOUNTER (EMERGENCY)
Age: 20
Discharge: HOME | End: 2020-02-22
Attending: FAMILY MEDICINE
Payer: COMMERCIAL

## 2020-02-22 ENCOUNTER — APPOINTMENT (OUTPATIENT)
Dept: GENERAL RADIOLOGY | Age: 20
End: 2020-02-22
Payer: COMMERCIAL

## 2020-02-22 VITALS
TEMPERATURE: 97.9 F | BODY MASS INDEX: 48.65 KG/M2 | DIASTOLIC BLOOD PRESSURE: 76 MMHG | HEIGHT: 67 IN | WEIGHT: 310 LBS | OXYGEN SATURATION: 97 % | HEART RATE: 74 BPM | RESPIRATION RATE: 16 BRPM | SYSTOLIC BLOOD PRESSURE: 118 MMHG

## 2020-02-22 PROCEDURE — 73030 X-RAY EXAM OF SHOULDER: CPT

## 2020-02-22 PROCEDURE — 99283 EMERGENCY DEPT VISIT LOW MDM: CPT

## 2020-02-22 RX ORDER — ACETAMINOPHEN 500 MG
1000 TABLET ORAL EVERY 8 HOURS PRN
Qty: 50 TABLET | Refills: 0 | Status: SHIPPED | OUTPATIENT
Start: 2020-02-22 | End: 2020-06-20 | Stop reason: ALTCHOICE

## 2020-02-22 RX ORDER — IBUPROFEN 400 MG/1
400 TABLET ORAL EVERY 8 HOURS PRN
Qty: 12 TABLET | Refills: 0 | Status: SHIPPED | OUTPATIENT
Start: 2020-02-22 | End: 2020-03-03

## 2020-02-22 ASSESSMENT — PAIN SCALES - GENERAL: PAINLEVEL_OUTOF10: 8

## 2020-02-22 ASSESSMENT — PAIN DESCRIPTION - PAIN TYPE: TYPE: ACUTE PAIN

## 2020-02-22 ASSESSMENT — PAIN DESCRIPTION - DESCRIPTORS: DESCRIPTORS: SORE

## 2020-02-22 ASSESSMENT — PAIN DESCRIPTION - LOCATION: LOCATION: SHOULDER

## 2020-02-22 NOTE — LETTER
[address]  [address] [place] [id number]  Phone: [phone]               February 22, 2020    Patient: Irena Allen. YOB: 2000   Date of Visit: 2/22/2020       To Whom It May Concern:    Amber Henriquez was seen and treated in our emergency department on 2/22/2020. He may return to work 02/26/20.       Sincerely,               Signature:__________________________________

## 2020-02-22 NOTE — ED PROVIDER NOTES
Department of Emergency Medicine   ED  Provider Note  Admit Date/RoomTime: 2/22/2020 10:19 AM  ED Room: 06/06  Chief Complaint:   Shoulder Pain (was playing wii bowling about 2 days ago and right shoulder has been sore and in pain since then)    History of Present Illness   Source of history provided by:  patient. History/Exam Limitations: none. Irena Alvarez is a 20 y.o. old male who has a past medical history of:   Past Medical History:   Diagnosis Date    Arthritis     Asperger syndrome    presents to the emergency department by private vehicle and ambulatory, for Right shoulder pain which occured 2 day(s) prior to arrival. Cause of complaint: sports injury while bowling. There has been a history of no prior problems with this area in the past.  He is right handed. Since onset the symptoms have been moderate in degree. His pain is aggraveated by movement, use and palpation and relieved by rest.     ROS    Pertinent positives and negatives are stated within HPI, all other systems reviewed and are negative. Past Surgical History:  has a past surgical history that includes Tonsillectomy; Adenoidectomy; and [place] tooth extraction. Social History:  reports that he has never smoked. He has never used smokeless tobacco. He reports that he does not drink alcohol or use drugs. Family History: family history is not on file. Allergies: Patient has no known allergies. Physical Exam           ED Triage Vitals [02/22/20 1018]   BP Temp Temp Source Pulse Resp SpO2 Height Weight   -- 97.9 °F (36.6 °C) Oral 74 16 97 % -- --      Oxygen Saturation Interpretation: Normal    Constitutional:  Alert, development consistent with age. Neck:  Normal ROM. Supple. Non-tender. Shoulder:  Right anterior. Tenderness: mild              Swelling: None. Deformity: no.              ROM: diminished range with pain. Skin:  no erythema, rash or wounds noted.          Neurovascular: Motor deficit: none. Sensory deficit: none. Pulse deficit: none. Capillary refill: normal.    Elbow:              Tenderness:  none. Swelling: None. Deformity: no.              ROM: full range of motion. Skin:  no erythema, rash or wounds noted. Lymphatics: No lymphangitis or edema noted  Neurological:  Oriented. Motor functions intact. Lab / Imaging Results   (All laboratory and radiology results have been personally reviewed by myself)  Labs:  No results found for this visit on 02/22/20. Imaging: All Radiology results interpreted by Radiologist unless otherwise noted. XR SHOULDER RIGHT (MIN 2 VIEWS)    (Results Pending)     ED Course / Medical Decision Making   Medications - No data to display         Consult(s):   None    Procedure(s):   none    MDM:   Films were obtained based on moderate  suspicion for bony injury as per history/physical findings . Plan is subsequently for symptom control, limited use and  immobilization with appropriate outpatient follow-up. Counseling: The emergency provider has spoken with the patient and discussed todays results, in addition to providing specific details for the plan of care and counseling regarding the diagnosis and prognosis. Questions are answered at this time and they are agreeable with the plan. Assessment      1. Sprain of right shoulder, unspecified shoulder sprain type, initial encounter      Plan   Discharge to home  Patient condition is good    New Medications     New Prescriptions    ACETAMINOPHEN (TYLENOL) 500 MG TABLET    Take 2 tablets by mouth every 8 hours as needed for Pain    IBUPROFEN (IBU) 400 MG TABLET    Take 1 tablet by mouth every 8 hours as needed for Pain Take with full glass of water     Electronically signed by Eli Tran MD   DD: 2/22/20  **This report was transcribed using voice recognition software.  Every effort was made to

## 2020-02-25 ENCOUNTER — HOSPITAL ENCOUNTER (EMERGENCY)
Age: 20
Discharge: HOME | End: 2020-02-25
Payer: COMMERCIAL

## 2020-02-25 VITALS
WEIGHT: 315 LBS | OXYGEN SATURATION: 98 % | RESPIRATION RATE: 16 BRPM | HEART RATE: 88 BPM | SYSTOLIC BLOOD PRESSURE: 140 MMHG | BODY MASS INDEX: 49.44 KG/M2 | HEIGHT: 67 IN | TEMPERATURE: 98.1 F | DIASTOLIC BLOOD PRESSURE: 88 MMHG

## 2020-02-25 PROCEDURE — 96372 THER/PROPH/DIAG INJ SC/IM: CPT

## 2020-02-25 PROCEDURE — 99282 EMERGENCY DEPT VISIT SF MDM: CPT

## 2020-02-25 PROCEDURE — 6360000002 HC RX W HCPCS: Performed by: PHYSICIAN ASSISTANT

## 2020-02-25 PROCEDURE — 6370000000 HC RX 637 (ALT 250 FOR IP): Performed by: PHYSICIAN ASSISTANT

## 2020-02-25 RX ORDER — KETOROLAC TROMETHAMINE 30 MG/ML
30 INJECTION, SOLUTION INTRAMUSCULAR; INTRAVENOUS ONCE
Status: COMPLETED | OUTPATIENT
Start: 2020-02-25 | End: 2020-02-25

## 2020-02-25 RX ORDER — PREDNISONE 20 MG/1
60 TABLET ORAL ONCE
Status: COMPLETED | OUTPATIENT
Start: 2020-02-25 | End: 2020-02-25

## 2020-02-25 RX ORDER — LIDOCAINE 50 MG/G
1 PATCH TOPICAL DAILY
Qty: 10 PATCH | Refills: 0 | Status: SHIPPED | OUTPATIENT
Start: 2020-02-25 | End: 2020-03-03

## 2020-02-25 RX ORDER — PREDNISONE 10 MG/1
40 TABLET ORAL DAILY
Qty: 20 TABLET | Refills: 0 | Status: SHIPPED | OUTPATIENT
Start: 2020-02-25 | End: 2020-03-01

## 2020-02-25 RX ADMIN — PREDNISONE 60 MG: 20 TABLET ORAL at 19:12

## 2020-02-25 RX ADMIN — KETOROLAC TROMETHAMINE 30 MG: 30 INJECTION, SOLUTION INTRAMUSCULAR; INTRAVENOUS at 19:13

## 2020-02-25 ASSESSMENT — PAIN SCALES - GENERAL
PAINLEVEL_OUTOF10: 8
PAINLEVEL_OUTOF10: 8

## 2020-02-25 ASSESSMENT — PAIN DESCRIPTION - FREQUENCY: FREQUENCY: CONTINUOUS

## 2020-02-25 ASSESSMENT — PAIN DESCRIPTION - ORIENTATION: ORIENTATION: RIGHT

## 2020-02-25 ASSESSMENT — PAIN DESCRIPTION - LOCATION: LOCATION: SHOULDER

## 2020-02-25 ASSESSMENT — PAIN DESCRIPTION - PAIN TYPE: TYPE: ACUTE PAIN

## 2020-02-25 ASSESSMENT — PAIN DESCRIPTION - ONSET: ONSET: ON-GOING

## 2020-02-25 ASSESSMENT — PAIN DESCRIPTION - DESCRIPTORS: DESCRIPTORS: ACHING;DISCOMFORT

## 2020-02-25 ASSESSMENT — PAIN - FUNCTIONAL ASSESSMENT: PAIN_FUNCTIONAL_ASSESSMENT: PREVENTS OR INTERFERES SOME ACTIVE ACTIVITIES AND ADLS

## 2020-02-25 NOTE — ED PROVIDER NOTES
REVIEWED ---------------------------   The nursing notes within the ED encounter and vital signs as below have been reviewed. BP (!) 140/88   Pulse 88   Temp 98.1 °F (36.7 °C) (Oral)   Resp 16   Ht 5' 7\" (1.702 m)   Wt (!) 316 lb 2 oz (143.4 kg)   SpO2 98%   BMI 49.51 kg/m²   Oxygen Saturation Interpretation: Normal      ---------------------------------------------------PHYSICAL EXAM--------------------------------------      Constitutional/General: Alert and oriented x3, well appearing, non toxic in NAD  Head: Normocephalic and atraumatic  Eyes: PERRL, EOMI  Mouth: Oropharynx clear, handling secretions, no trismus  Neck: Supple, full ROM,   Pulmonary: Lungs clear to auscultation bilaterally, no wheezes, rales, or rhonchi. Not in respiratory distress  Cardiovascular:  Regular rate and rhythm, no murmurs, gallops, or rubs. 2+ distal pulses  Abdomen: Soft, non tender, non distended,   Extremities: Moves all extremities x 4. Warm and well perfused her to palpation of the proximal shoulder and volar aspect of the upper arm. No deficit of the muscle noted. He has normal sensation strength range of motion. Pulses are normal  Skin: warm and dry without rash  Neurologic: GCS 15,  Psych: Normal Affect      ------------------------------ ED COURSE/MEDICAL DECISION MAKING----------------------  Medications   predniSONE (DELTASONE) tablet 60 mg (60 mg Oral Given 2/25/20 1912)   ketorolac (TORADOL) injection 30 mg (30 mg Intramuscular Given 2/25/20 1913)         ED COURSE:       Medical Decision Making:    Comes in with complaint of right shoulder pain. Pain is worse with movement. He had a previous x-ray which was negative. Discussed with patient he needs to follow-up outpatient if he continues with pain they may need to obtain an MRI. He presently has a sling he was given instructions of exercise to prevent shoulder from freezing. Possible bicep tear or rotator cuff injury.   He was found to continue with anti-inflammatories he was placed on a prednisone burst and given lidocaine patch. Patient was given referral to orthopedics      Counseling: The emergency provider has spoken with the patient and discussed todays results, in addition to providing specific details for the plan of care and counseling regarding the diagnosis and prognosis. Questions are answered at this time and they are agreeable with the plan.      --------------------------------- IMPRESSION AND DISPOSITION ---------------------------------    IMPRESSION  1. Right arm pain        DISPOSITION  Disposition: Discharge to home  Patient condition is good      NOTE: This report was transcribed using voice recognition software.  Every effort was made to ensure accuracy; however, inadvertent computerized transcription errors may be present     Balbir James  02/25/20 1958

## 2020-03-03 ENCOUNTER — OFFICE VISIT (OUTPATIENT)
Dept: ORTHOPEDIC SURGERY | Age: 20
End: 2020-03-03
Payer: COMMERCIAL

## 2020-03-03 VITALS — WEIGHT: 315 LBS | HEIGHT: 67 IN | BODY MASS INDEX: 49.44 KG/M2

## 2020-03-03 PROCEDURE — G8427 DOCREV CUR MEDS BY ELIG CLIN: HCPCS | Performed by: ORTHOPAEDIC SURGERY

## 2020-03-03 PROCEDURE — 1036F TOBACCO NON-USER: CPT | Performed by: ORTHOPAEDIC SURGERY

## 2020-03-03 PROCEDURE — 99203 OFFICE O/P NEW LOW 30 MIN: CPT | Performed by: ORTHOPAEDIC SURGERY

## 2020-03-03 PROCEDURE — G8417 CALC BMI ABV UP PARAM F/U: HCPCS | Performed by: ORTHOPAEDIC SURGERY

## 2020-03-03 PROCEDURE — 20610 DRAIN/INJ JOINT/BURSA W/O US: CPT | Performed by: ORTHOPAEDIC SURGERY

## 2020-03-03 PROCEDURE — G8484 FLU IMMUNIZE NO ADMIN: HCPCS | Performed by: ORTHOPAEDIC SURGERY

## 2020-03-03 RX ORDER — SERTRALINE HYDROCHLORIDE 25 MG/1
25 TABLET, FILM COATED ORAL DAILY
COMMUNITY
Start: 2019-12-07

## 2020-03-03 RX ORDER — PREDNISONE 20 MG/1
TABLET ORAL
COMMUNITY
Start: 2019-12-16 | End: 2020-06-20 | Stop reason: ALTCHOICE

## 2020-03-03 RX ORDER — PIMOZIDE 2 MG/1
2 TABLET ORAL NIGHTLY
COMMUNITY

## 2020-03-03 NOTE — PROGRESS NOTES
Highest education level: Not on file   Occupational History    Not on file   Social Needs    Financial resource strain: Not on file    Food insecurity:     Worry: Not on file     Inability: Not on file    Transportation needs:     Medical: Not on file     Non-medical: Not on file   Tobacco Use    Smoking status: Never Smoker    Smokeless tobacco: Never Used   Substance and Sexual Activity    Alcohol use: Never     Frequency: Never    Drug use: Never    Sexual activity: Never   Lifestyle    Physical activity:     Days per week: Not on file     Minutes per session: Not on file    Stress: Not on file   Relationships    Social connections:     Talks on phone: Not on file     Gets together: Not on file     Attends Zoroastrianism service: Not on file     Active member of club or organization: Not on file     Attends meetings of clubs or organizations: Not on file     Relationship status: Not on file    Intimate partner violence:     Fear of current or ex partner: Not on file     Emotionally abused: Not on file     Physically abused: Not on file     Forced sexual activity: Not on file   Other Topics Concern    Not on file   Social History Narrative    ** Merged History Encounter **          No family history on file. REVIEW OF SYSTEMS:     General/Constitution:  (-)weight loss, (-)fever, (-)chills, (-)weakness. Skin: (-) rash,(-) psoriasis,(-) eczema, (-)skin cancer. Musculoskeletal: (-) fractures,  (-) dislocations,(-) collagen vascular disease, (-) fibromyalgia, (-) multiple sclerosis, (-) muscular dystrophy, (-) RSD,(-) joint pain (-)swelling, (-) joint pain,swelling. Neurologic: (-) epilepsy, (-)seizures,(-) brain tumor,(-) TIA, (-)stroke, (-)headaches, (-)Parkinson disease,(-) memory loss, (-) LOC. Cardiovascular: (-) Chest pain, (-) swelling in legs/feet, (-) SOB, (-) cramping in legs/feet with walking. Respiratory: (-) SOB, (-) Coughing, (-) night sweats.   GI: (-) nausea, (-) vomiting, (-) of the cervical spine without pain. There is no cervical tenderness to palpation. Shoulder Exam:  On evaluation of his bilaterally upper extremities, his right shoulder has no deformity. There is tenderness upon palpation of the lateral arm. There is not evidence of scapular dyskinesis. There is not muscle atrophy in shoulder girdle. The range of motion for the Right Shoulder is 140/35/l2 and for the Left shoulder is 150/45/t10. Right shoulder Motor strength is 5/5 in the supraspinatus, 5/5 internal rotation and 5/5 in external rotation, and Left shoulder motor strength 5/5 in supraspinatus, 5/5 in internal rotation, 5/5 in external rotation. Right shoulder:  positive Impingement , positive Johnson ,positive  Speeds,negative  Apprehension ,negative Chang Load Shift, negative Paul manuver, negative Cross arm test.     Left shoulder:  negative Impingement , negative Johnson ,negative  Speeds,negative  Apprehension ,negative Chang Load Shift, negative Paul manuver, negative Cross arm test.     XRAY:    The study is technically suboptimal. Evaluation is limited. Allowing for the position and technique the bones appear to be in   anatomic alignment. No gross  fracture is identified. No gross foreign body is identified. No other gross osseous abnormality is seen.             MRI:  n/a    Radiographic findings reviewed with patient    Impression:   Encounter Diagnosis   Name Primary?  Shoulder impingement, right Yes       Plan: Natural history and expected course discussed. Questions answered. Educational material distributed. Reduction in offending activity. Gentle ROM exercises  RICE therapy. NSAIDs per medication orders. Keep off work for 1 wk,  Will return 3/9/20    HEP     I will proceed with a cortisone injection in the Right shoulder. Verbal and written consent was obtained for the injection.   Skin was prepped with alcohol, 1 ml of Kenalog 40 mg and 9 ml of 0.25% Marcaine was injected to the posterior shoulder through the subacromial space of the Right Shoulder. The patient tolerated the injections well. I will see the patient back prn.

## 2020-03-04 RX ORDER — TRIAMCINOLONE ACETONIDE 40 MG/ML
40 INJECTION, SUSPENSION INTRA-ARTICULAR; INTRAMUSCULAR ONCE
Status: COMPLETED | OUTPATIENT
Start: 2020-03-04 | End: 2020-03-04

## 2020-03-04 RX ADMIN — TRIAMCINOLONE ACETONIDE 40 MG: 40 INJECTION, SUSPENSION INTRA-ARTICULAR; INTRAMUSCULAR at 09:08

## 2020-03-11 ENCOUNTER — HOSPITAL ENCOUNTER (EMERGENCY)
Age: 20
Discharge: HOME | End: 2020-03-11
Payer: COMMERCIAL

## 2020-03-11 VITALS
BODY MASS INDEX: 49.44 KG/M2 | HEIGHT: 67 IN | TEMPERATURE: 97.5 F | RESPIRATION RATE: 18 BRPM | OXYGEN SATURATION: 98 % | SYSTOLIC BLOOD PRESSURE: 182 MMHG | DIASTOLIC BLOOD PRESSURE: 88 MMHG | HEART RATE: 100 BPM | WEIGHT: 315 LBS

## 2020-03-11 PROCEDURE — 99282 EMERGENCY DEPT VISIT SF MDM: CPT

## 2020-03-11 RX ORDER — NAPROXEN 500 MG/1
500 TABLET ORAL 2 TIMES DAILY
Qty: 60 TABLET | Refills: 0 | Status: SHIPPED | OUTPATIENT
Start: 2020-03-11 | End: 2020-06-20 | Stop reason: ALTCHOICE

## 2020-03-11 ASSESSMENT — PAIN DESCRIPTION - PROGRESSION: CLINICAL_PROGRESSION: NOT CHANGED

## 2020-03-11 ASSESSMENT — PAIN SCALES - GENERAL: PAINLEVEL_OUTOF10: 8

## 2020-03-11 ASSESSMENT — PAIN DESCRIPTION - DESCRIPTORS: DESCRIPTORS: ACHING

## 2020-03-11 ASSESSMENT — PAIN DESCRIPTION - ORIENTATION: ORIENTATION: LEFT;LOWER

## 2020-03-11 ASSESSMENT — PAIN DESCRIPTION - FREQUENCY: FREQUENCY: CONTINUOUS

## 2020-03-11 ASSESSMENT — PAIN DESCRIPTION - LOCATION: LOCATION: ARM

## 2020-03-11 ASSESSMENT — PAIN DESCRIPTION - ONSET: ONSET: GRADUAL

## 2020-03-11 ASSESSMENT — PAIN DESCRIPTION - PAIN TYPE: TYPE: ACUTE PAIN

## 2020-03-11 ASSESSMENT — PAIN - FUNCTIONAL ASSESSMENT: PAIN_FUNCTIONAL_ASSESSMENT: ACTIVITIES ARE NOT PREVENTED

## 2020-03-12 NOTE — ED PROVIDER NOTES
They're educated on signs and symptoms that would require emergent return to the ED. RICE discussed. They were educated on newly prescribed medications and instructed on sling/splint use. Vitals stable and patient in no distress at discharge. Assessment      1. Tendinitis of finger      Plan   Discharge to home  Patient condition is good    New Medications     Discharge Medication List as of 3/11/2020  8:45 PM      START taking these medications    Details   naproxen (NAPROSYN) 500 MG tablet Take 1 tablet by mouth 2 times daily, Disp-60 tablet, R-0Print      diclofenac sodium (VOLTAREN) 1 % GEL Apply 2g to affected area 4x daily as needed, Disp-100 g, R-3, Print             Electronically signed by Tyson Lara PA-C   DD: 3/11/20  **This report was transcribed using voice recognition software. Every effort was made to ensure accuracy; however, inadvertent computerized transcription errors may be present.   END OF ED PROVIDER NOTE        Tyson Lara PA-C  03/12/20 [id number]

## 2020-06-02 ENCOUNTER — APPOINTMENT (OUTPATIENT)
Dept: CT IMAGING | Age: 20
End: 2020-06-02
Payer: COMMERCIAL

## 2020-06-02 ENCOUNTER — HOSPITAL ENCOUNTER (EMERGENCY)
Age: 20
Discharge: HOME | End: 2020-06-03
Payer: COMMERCIAL

## 2020-06-02 LAB
ALBUMIN SERPL-MCNC: 3.9 G/DL (ref 3.5–5.2)
ALP BLD-CCNC: 106 U/L (ref 40–129)
ALT SERPL-CCNC: 45 U/L (ref 0–40)
ANION GAP SERPL CALCULATED.3IONS-SCNC: 16 MMOL/L (ref 7–16)
AST SERPL-CCNC: 32 U/L (ref 0–39)
BASOPHILS ABSOLUTE: 0.05 E9/L (ref 0–0.2)
BASOPHILS RELATIVE PERCENT: 0.4 % (ref 0–2)
BILIRUB SERPL-MCNC: 0.2 MG/DL (ref 0–1.2)
BUN BLDV-MCNC: 11 MG/DL (ref 6–20)
CALCIUM SERPL-MCNC: 9 MG/DL (ref 8.6–10.2)
CHLORIDE BLD-SCNC: 104 MMOL/L (ref 98–107)
CO2: 21 MMOL/L (ref 22–29)
CREAT SERPL-MCNC: 0.8 MG/DL (ref 0.7–1.2)
EOSINOPHILS ABSOLUTE: 0.06 E9/L (ref 0.05–0.5)
EOSINOPHILS RELATIVE PERCENT: 0.5 % (ref 0–6)
GFR AFRICAN AMERICAN: >60
GFR NON-AFRICAN AMERICAN: >60 ML/MIN/1.73
GLUCOSE BLD-MCNC: 149 MG/DL (ref 74–99)
HCT VFR BLD CALC: 46.2 % (ref 37–54)
HEMOGLOBIN: 15.2 G/DL (ref 12.5–16.5)
IMMATURE GRANULOCYTES #: 0.18 E9/L
IMMATURE GRANULOCYTES %: 1.5 % (ref 0–5)
LYMPHOCYTES ABSOLUTE: 2.62 E9/L (ref 1.5–4)
LYMPHOCYTES RELATIVE PERCENT: 21.5 % (ref 20–42)
MCH RBC QN AUTO: 29 PG (ref 26–35)
MCHC RBC AUTO-ENTMCNC: 32.9 % (ref 32–34.5)
MCV RBC AUTO: 88.2 FL (ref 80–99.9)
MONOCYTES ABSOLUTE: 1.05 E9/L (ref 0.1–0.95)
MONOCYTES RELATIVE PERCENT: 8.6 % (ref 2–12)
NEUTROPHILS ABSOLUTE: 8.25 E9/L (ref 1.8–7.3)
NEUTROPHILS RELATIVE PERCENT: 67.5 % (ref 43–80)
PDW BLD-RTO: 13.6 FL (ref 11.5–15)
PLATELET # BLD: 329 E9/L (ref 130–450)
PMV BLD AUTO: 9.6 FL (ref 7–12)
POTASSIUM SERPL-SCNC: 4 MMOL/L (ref 3.5–5)
RBC # BLD: 5.24 E12/L (ref 3.8–5.8)
SODIUM BLD-SCNC: 141 MMOL/L (ref 132–146)
TOTAL PROTEIN: 7.1 G/DL (ref 6.4–8.3)
WBC # BLD: 12.2 E9/L (ref 4.5–11.5)

## 2020-06-02 PROCEDURE — 85025 COMPLETE CBC W/AUTO DIFF WBC: CPT

## 2020-06-02 PROCEDURE — 6360000002 HC RX W HCPCS: Performed by: NURSE PRACTITIONER

## 2020-06-02 PROCEDURE — 99284 EMERGENCY DEPT VISIT MOD MDM: CPT

## 2020-06-02 PROCEDURE — 96375 TX/PRO/DX INJ NEW DRUG ADDON: CPT

## 2020-06-02 PROCEDURE — 70450 CT HEAD/BRAIN W/O DYE: CPT

## 2020-06-02 PROCEDURE — 96374 THER/PROPH/DIAG INJ IV PUSH: CPT

## 2020-06-02 PROCEDURE — 6370000000 HC RX 637 (ALT 250 FOR IP): Performed by: NURSE PRACTITIONER

## 2020-06-02 PROCEDURE — 93005 ELECTROCARDIOGRAM TRACING: CPT | Performed by: NURSE PRACTITIONER

## 2020-06-02 PROCEDURE — 80053 COMPREHEN METABOLIC PANEL: CPT

## 2020-06-02 PROCEDURE — 2580000003 HC RX 258: Performed by: NURSE PRACTITIONER

## 2020-06-02 RX ORDER — PROCHLORPERAZINE EDISYLATE 5 MG/ML
10 INJECTION INTRAMUSCULAR; INTRAVENOUS ONCE
Status: COMPLETED | OUTPATIENT
Start: 2020-06-02 | End: 2020-06-02

## 2020-06-02 RX ORDER — 0.9 % SODIUM CHLORIDE 0.9 %
1000 INTRAVENOUS SOLUTION INTRAVENOUS ONCE
Status: COMPLETED | OUTPATIENT
Start: 2020-06-02 | End: 2020-06-02

## 2020-06-02 RX ORDER — DIPHENHYDRAMINE HYDROCHLORIDE 50 MG/ML
25 INJECTION INTRAMUSCULAR; INTRAVENOUS ONCE
Status: COMPLETED | OUTPATIENT
Start: 2020-06-02 | End: 2020-06-02

## 2020-06-02 RX ORDER — ACETAMINOPHEN 500 MG
1000 TABLET ORAL ONCE
Status: COMPLETED | OUTPATIENT
Start: 2020-06-02 | End: 2020-06-02

## 2020-06-02 RX ADMIN — SODIUM CHLORIDE 1000 ML: 9 INJECTION, SOLUTION INTRAVENOUS at 21:14

## 2020-06-02 RX ADMIN — PROCHLORPERAZINE EDISYLATE 10 MG: 5 INJECTION INTRAMUSCULAR; INTRAVENOUS at 21:14

## 2020-06-02 RX ADMIN — DIPHENHYDRAMINE HYDROCHLORIDE 25 MG: 50 INJECTION, SOLUTION INTRAMUSCULAR; INTRAVENOUS at 21:14

## 2020-06-02 RX ADMIN — ACETAMINOPHEN 1000 MG: 500 TABLET ORAL at 21:14

## 2020-06-02 ASSESSMENT — PAIN DESCRIPTION - DESCRIPTORS: DESCRIPTORS: HEADACHE

## 2020-06-02 ASSESSMENT — PAIN DESCRIPTION - LOCATION: LOCATION: HEAD

## 2020-06-02 ASSESSMENT — PAIN SCALES - GENERAL
PAINLEVEL_OUTOF10: 8
PAINLEVEL_OUTOF10: 8

## 2020-06-02 ASSESSMENT — PAIN DESCRIPTION - PAIN TYPE: TYPE: ACUTE PAIN

## 2020-06-03 VITALS
SYSTOLIC BLOOD PRESSURE: 113 MMHG | RESPIRATION RATE: 20 BRPM | TEMPERATURE: 96.5 F | HEART RATE: 84 BPM | BODY MASS INDEX: 49.44 KG/M2 | DIASTOLIC BLOOD PRESSURE: 65 MMHG | HEIGHT: 67 IN | WEIGHT: 315 LBS | OXYGEN SATURATION: 96 %

## 2020-06-03 LAB
EKG ATRIAL RATE: 106 BPM
EKG P AXIS: 36 DEGREES
EKG P-R INTERVAL: 138 MS
EKG Q-T INTERVAL: 358 MS
EKG QRS DURATION: 84 MS
EKG QTC CALCULATION (BAZETT): 475 MS
EKG R AXIS: 65 DEGREES
EKG T AXIS: 3 DEGREES
EKG VENTRICULAR RATE: 106 BPM

## 2020-06-03 PROCEDURE — 93010 ELECTROCARDIOGRAM REPORT: CPT | Performed by: INTERNAL MEDICINE

## 2020-06-03 ASSESSMENT — PAIN SCALES - GENERAL: PAINLEVEL_OUTOF10: 3

## 2020-06-20 ENCOUNTER — HOSPITAL ENCOUNTER (EMERGENCY)
Age: 20
Discharge: HOME | End: 2020-06-20
Attending: EMERGENCY MEDICINE
Payer: COMMERCIAL

## 2020-06-20 VITALS
OXYGEN SATURATION: 100 % | DIASTOLIC BLOOD PRESSURE: 93 MMHG | SYSTOLIC BLOOD PRESSURE: 156 MMHG | TEMPERATURE: 98.1 F | HEART RATE: 115 BPM | RESPIRATION RATE: 20 BRPM

## 2020-06-20 PROCEDURE — 96372 THER/PROPH/DIAG INJ SC/IM: CPT

## 2020-06-20 PROCEDURE — 6360000002 HC RX W HCPCS: Performed by: EMERGENCY MEDICINE

## 2020-06-20 PROCEDURE — G0381 LEV 2 HOSP TYPE B ED VISIT: HCPCS

## 2020-06-20 RX ORDER — KETOROLAC TROMETHAMINE 30 MG/ML
60 INJECTION, SOLUTION INTRAMUSCULAR; INTRAVENOUS ONCE
Status: COMPLETED | OUTPATIENT
Start: 2020-06-20 | End: 2020-06-20

## 2020-06-20 RX ORDER — KETOROLAC TROMETHAMINE 10 MG/1
TABLET, FILM COATED ORAL
Qty: 12 TABLET | Refills: 0 | Status: SHIPPED | OUTPATIENT
Start: 2020-06-20 | End: 2020-07-02 | Stop reason: ALTCHOICE

## 2020-06-20 RX ADMIN — KETOROLAC TROMETHAMINE 60 MG: 30 INJECTION, SOLUTION INTRAMUSCULAR at 19:30

## 2020-06-20 ASSESSMENT — ENCOUNTER SYMPTOMS
EYE PAIN: 0
EYE DISCHARGE: 0
COUGH: 0
SORE THROAT: 0
VOMITING: 0
SINUS PRESSURE: 0
WHEEZING: 0
BACK PAIN: 1
NAUSEA: 0
EYE REDNESS: 0
DIARRHEA: 0
SHORTNESS OF BREATH: 0
ABDOMINAL PAIN: 0

## 2020-06-20 ASSESSMENT — PAIN DESCRIPTION - PAIN TYPE: TYPE: ACUTE PAIN

## 2020-06-20 ASSESSMENT — PAIN DESCRIPTION - PROGRESSION
CLINICAL_PROGRESSION: GRADUALLY IMPROVING
CLINICAL_PROGRESSION: NOT CHANGED

## 2020-06-20 ASSESSMENT — PAIN DESCRIPTION - FREQUENCY: FREQUENCY: CONTINUOUS

## 2020-06-20 ASSESSMENT — PAIN SCALES - GENERAL
PAINLEVEL_OUTOF10: 9
PAINLEVEL_OUTOF10: 9

## 2020-06-20 ASSESSMENT — PAIN DESCRIPTION - LOCATION: LOCATION: BACK

## 2020-06-20 ASSESSMENT — PAIN DESCRIPTION - DESCRIPTORS: DESCRIPTORS: DISCOMFORT

## 2020-06-20 ASSESSMENT — PAIN - FUNCTIONAL ASSESSMENT: PAIN_FUNCTIONAL_ASSESSMENT: ACTIVITIES ARE NOT PREVENTED

## 2020-06-20 NOTE — ED PROVIDER NOTES
range of motion without pain, normal range of motion and neck supple. Trachea: Trachea and phonation normal.   Cardiovascular:      Rate and Rhythm: Normal rate and regular rhythm. Heart sounds: Normal heart sounds. No murmur. Pulmonary:      Effort: Pulmonary effort is normal. No respiratory distress. Breath sounds: Normal breath sounds. No transmitted upper airway sounds. No decreased breath sounds, wheezing, rhonchi or rales. Abdominal:      General: Bowel sounds are normal.      Palpations: Abdomen is soft. Tenderness: There is no abdominal tenderness. There is no guarding or rebound. Skin:     General: Skin is warm and dry. Neurological:      Mental Status: He is alert and oriented to person, place, and time. Cranial Nerves: No cranial nerve deficit. Coordination: Coordination normal.         Procedures    MDM            --------------------------------------------- PAST HISTORY ---------------------------------------------  Past Medical History:  has a past medical history of Arthritis and Asperger syndrome. Past Surgical History:  has a past surgical history that includes Tonsillectomy; Adenoidectomy; and [place] tooth extraction. Social History:  reports that he has never smoked. He has never used smokeless tobacco. He reports that he does not drink alcohol or use drugs. Family History: family history is not on file. The patients home medications have been reviewed. Allergies: Patient has no known allergies. -------------------------------------------------- RESULTS -------------------------------------------------  No results found for this visit on 06/20/20. No orders to display       ------------------------- NURSING NOTES AND VITALS REVIEWED ---------------------------   The nursing notes within the ED encounter and vital signs as below have been reviewed.    BP (!) 156/93   Pulse 115   Temp 98.1 °F (36.7 °C) (Oral)   Resp 20   SpO2 100%

## 2020-06-25 ENCOUNTER — TELEPHONE (OUTPATIENT)
Dept: SLEEP CENTER | Age: 20
End: 2020-06-25

## 2020-07-02 ENCOUNTER — HOSPITAL ENCOUNTER (EMERGENCY)
Age: 20
Discharge: HOME | End: 2020-07-02
Attending: EMERGENCY MEDICINE
Payer: COMMERCIAL

## 2020-07-02 ENCOUNTER — APPOINTMENT (OUTPATIENT)
Dept: GENERAL RADIOLOGY | Age: 20
End: 2020-07-02
Payer: COMMERCIAL

## 2020-07-02 VITALS
HEART RATE: 112 BPM | DIASTOLIC BLOOD PRESSURE: 80 MMHG | SYSTOLIC BLOOD PRESSURE: 136 MMHG | WEIGHT: 315 LBS | OXYGEN SATURATION: 97 % | TEMPERATURE: 98 F | RESPIRATION RATE: 20 BRPM | BODY MASS INDEX: 54.19 KG/M2

## 2020-07-02 PROCEDURE — G0382 LEV 3 HOSP TYPE B ED VISIT: HCPCS

## 2020-07-02 PROCEDURE — 73560 X-RAY EXAM OF KNEE 1 OR 2: CPT

## 2020-07-02 RX ORDER — IBUPROFEN 600 MG/1
600 TABLET ORAL EVERY 8 HOURS PRN
Qty: 30 TABLET | Refills: 0 | OUTPATIENT
Start: 2020-07-02 | End: 2022-01-06

## 2020-07-02 ASSESSMENT — ENCOUNTER SYMPTOMS
EYE REDNESS: 0
WHEEZING: 0
EYE PAIN: 0
COUGH: 0
VOMITING: 0
SHORTNESS OF BREATH: 0
BACK PAIN: 0
SINUS PRESSURE: 0
ABDOMINAL PAIN: 0
DIARRHEA: 0
SORE THROAT: 0
NAUSEA: 0
EYE DISCHARGE: 0

## 2020-07-02 ASSESSMENT — PAIN DESCRIPTION - ORIENTATION: ORIENTATION: LEFT

## 2020-07-02 ASSESSMENT — PAIN DESCRIPTION - PROGRESSION
CLINICAL_PROGRESSION: NOT CHANGED
CLINICAL_PROGRESSION: NOT CHANGED

## 2020-07-02 ASSESSMENT — PAIN DESCRIPTION - FREQUENCY: FREQUENCY: CONTINUOUS

## 2020-07-02 ASSESSMENT — PAIN DESCRIPTION - PAIN TYPE: TYPE: ACUTE PAIN

## 2020-07-02 ASSESSMENT — PAIN SCALES - GENERAL: PAINLEVEL_OUTOF10: 7

## 2020-07-02 ASSESSMENT — PAIN DESCRIPTION - DESCRIPTORS: DESCRIPTORS: BURNING;PATIENT UNABLE TO DESCRIBE

## 2020-07-02 ASSESSMENT — PAIN DESCRIPTION - ONSET: ONSET: SUDDEN

## 2020-07-02 ASSESSMENT — PAIN DESCRIPTION - LOCATION: LOCATION: KNEE;LEG

## 2020-07-02 NOTE — ED PROVIDER NOTES
The history is provided by the patient. Knee Problem   Location:  Knee  Time since incident:  2 days  Injury: yes    Mechanism of injury: fall    Fall:     Fall occurred: fell off a ladder in pool. Knee location:  L knee  Pain details:     Quality:  Aching    Severity:  Moderate  Associated symptoms: no back pain and no fever         Review of Systems   Constitutional: Negative for chills and fever. HENT: Negative for ear pain, sinus pressure and sore throat. Eyes: Negative for pain, discharge and redness. Respiratory: Negative for cough, shortness of breath and wheezing. Cardiovascular: Negative for chest pain. Gastrointestinal: Negative for abdominal pain, diarrhea, nausea and vomiting. Genitourinary: Negative for dysuria and frequency. Musculoskeletal: Negative for arthralgias and back pain. Skin: Negative for rash and wound. Neurological: Negative for weakness and headaches. Hematological: Negative for adenopathy. All other systems reviewed and are negative. Physical Exam  Vitals signs and nursing note reviewed. Constitutional:       Appearance: He is well-developed. HENT:      Head: Normocephalic and atraumatic. Jaw: No trismus. Right Ear: Hearing, tympanic membrane, ear canal and external ear normal.      Left Ear: Hearing, tympanic membrane, ear canal and external ear normal.      Nose: Nose normal.      Right Sinus: No maxillary sinus tenderness or frontal sinus tenderness. Left Sinus: No maxillary sinus tenderness or frontal sinus tenderness. Mouth/Throat:      Pharynx: Uvula midline. No uvula swelling. Eyes:      General: Lids are normal.      Conjunctiva/sclera: Conjunctivae normal.      Pupils: Pupils are equal, round, and reactive to light. Neck:      Musculoskeletal: Normal range of motion and neck supple. Cardiovascular:      Rate and Rhythm: Normal rate and regular rhythm. Heart sounds: Normal heart sounds. No murmur.    Pulmonary: Effort: Pulmonary effort is normal. No respiratory distress. Breath sounds: Normal breath sounds. No wheezing or rales. Abdominal:      General: Bowel sounds are normal.      Palpations: Abdomen is soft. Abdomen is not rigid. Tenderness: There is no abdominal tenderness. There is no guarding or rebound. Musculoskeletal:      Left knee: He exhibits decreased range of motion and swelling. He exhibits no effusion. Tenderness found. Skin:     General: Skin is warm and dry. Findings: No abrasion or rash. Neurological:      Mental Status: He is alert and oriented to person, place, and time. GCS: GCS eye subscore is 4. GCS verbal subscore is 5. GCS motor subscore is 6. Cranial Nerves: No cranial nerve deficit. Sensory: No sensory deficit. Coordination: Coordination normal.      Gait: Gait normal.          Procedures     MDM          --------------------------------------------- PAST HISTORY ---------------------------------------------  Past Medical History:  has a past medical history of Arthritis, Asperger syndrome, Obesity, and Sleep apnea. Past Surgical History:  has a past surgical history that includes Tonsillectomy; Adenoidectomy; and [place] tooth extraction. Social History:  reports that he has never smoked. He has never used smokeless tobacco. He reports that he does not drink alcohol or use drugs. Family History: family history is not on file. The patients home medications have been reviewed. Allergies: Patient has no known allergies. -------------------------------------------------- RESULTS -------------------------------------------------  Labs:  No results found for this visit on 07/02/20. Radiology:  XR KNEE LEFT (1-2 VIEWS)   Final Result   Unremarkable radiographs left knee. NOTE: A number of tibial plateau fractures will be radiographically occult   without internal oblique view in the knee series.       Follow-up imaging recommended if pain persists or worsens following   conservative management. Xr Knee Left (1-2 Views)    Result Date: 7/2/2020  EXAMINATION: TWO XRAY VIEWS OF THE LEFT KNEE 7/2/2020 6:52 pm COMPARISON: None. HISTORY: Acute left knee pain following injury, fell off a ladder 2 days ago FINDINGS: Osseous structures of the left knee are intact and align normally. No retained radiopaque foreign body. Joint spaces appear well maintained. Unremarkable radiographs left knee. NOTE: A number of tibial plateau fractures will be radiographically occult without internal oblique view in the knee series. Follow-up imaging recommended if pain persists or worsens following conservative management.         ------------------------- NURSING NOTES AND VITALS REVIEWED ---------------------------  Date / Time Roomed:  7/2/2020  6:39 PM  ED Bed Assignment:  02/02    The nursing notes within the ED encounter and vital signs as below have been reviewed. /80   Pulse 112   Temp 98 °F (36.7 °C) Comment: forehead temp on arrival to facility  Resp 20   Wt (!) 346 lb (156.9 kg)   SpO2 97%   BMI 54.19 kg/m²   Oxygen Saturation Interpretation: Normal      ------------------------------------------ PROGRESS NOTES ------------------------------------------  I have spoken with the patient and discussed todays results, in addition to providing specific details for the plan of care and counseling regarding the diagnosis and prognosis. Their questions are answered at this time and they are agreeable with the plan. I discussed at length with them reasons for immediate return here for re evaluation. They will followup with primary care by calling their office tomorrow. --------------------------------- ADDITIONAL PROVIDER NOTES ---------------------------------  At this time the patient is without objective evidence of an acute process requiring hospitalization or inpatient management.   They have remained hemodynamically

## 2020-07-06 ENCOUNTER — TELEPHONE (OUTPATIENT)
Dept: ADMINISTRATIVE | Age: 20
End: 2020-07-06

## 2020-07-06 NOTE — TELEPHONE ENCOUNTER
Patient called in today reports being seen in ED on 7/2 for Sprain of left knee, unspecified ligament, initial encounter, is in a lot of pain states ibuprofen is no longer helping.  Best call back is [phone]

## 2021-07-07 ENCOUNTER — APPOINTMENT (OUTPATIENT)
Dept: GENERAL RADIOLOGY | Age: 21
End: 2021-07-07
Payer: COMMERCIAL

## 2021-07-07 ENCOUNTER — HOSPITAL ENCOUNTER (EMERGENCY)
Age: 21
Discharge: HOME | End: 2021-07-07
Payer: COMMERCIAL

## 2021-07-07 VITALS
SYSTOLIC BLOOD PRESSURE: 159 MMHG | RESPIRATION RATE: 18 BRPM | HEIGHT: 67 IN | DIASTOLIC BLOOD PRESSURE: 92 MMHG | HEART RATE: 92 BPM | OXYGEN SATURATION: 98 % | TEMPERATURE: 99.3 F | WEIGHT: 315 LBS | BODY MASS INDEX: 49.44 KG/M2

## 2021-07-07 DIAGNOSIS — S63.615A SPRAIN OF LEFT RING FINGER, UNSPECIFIED SITE OF DIGIT, INITIAL ENCOUNTER: Primary | ICD-10-CM

## 2021-07-07 PROCEDURE — 99283 EMERGENCY DEPT VISIT LOW MDM: CPT

## 2021-07-07 PROCEDURE — 73130 X-RAY EXAM OF HAND: CPT

## 2021-07-07 PROCEDURE — 6370000000 HC RX 637 (ALT 250 FOR IP): Performed by: PHYSICIAN ASSISTANT

## 2021-07-07 RX ORDER — NAPROXEN 500 MG/1
500 TABLET ORAL 2 TIMES DAILY WITH MEALS
Qty: 20 TABLET | Refills: 0 | Status: SHIPPED | OUTPATIENT
Start: 2021-07-07 | End: 2021-09-12

## 2021-07-07 RX ORDER — NAPROXEN 500 MG/1
500 TABLET ORAL ONCE
Status: COMPLETED | OUTPATIENT
Start: 2021-07-07 | End: 2021-07-07

## 2021-07-07 RX ADMIN — NAPROXEN 500 MG: 500 TABLET ORAL at 22:11

## 2021-07-07 ASSESSMENT — PAIN SCALES - GENERAL
PAINLEVEL_OUTOF10: 6
PAINLEVEL_OUTOF10: 6

## 2021-07-07 ASSESSMENT — PAIN DESCRIPTION - PAIN TYPE: TYPE: ACUTE PAIN

## 2021-07-07 ASSESSMENT — PAIN DESCRIPTION - ORIENTATION: ORIENTATION: LEFT

## 2021-07-07 ASSESSMENT — PAIN DESCRIPTION - LOCATION: LOCATION: FINGER (COMMENT WHICH ONE)

## 2021-07-08 NOTE — ED PROVIDER NOTES
[facility]    HPI:  7/7/21, Time: 8:52 PM EDT         Juan Lehman is a 24 y.o. male presenting to the ED for left ring finger injury, beginning 3 days ago. The complaint has been persistent, moderate in severity, and worsened by movement of left ring finger. Patient reports that he fell just prior to the onset of his symptoms. Is unsure of how he fell or hit his hand. Denies any previous injuries to this area. Has been afebrile without recent travel or sick contacts. Patient denies all other symptoms and injuries at this time. Review of Systems:   A complete review of systems was performed and pertinent positives and negatives are stated within HPI, all other systems reviewed and are negative.          --------------------------------------------- PAST HISTORY ---------------------------------------------  Past Medical History:  has a past medical history of Arthritis, Asperger syndrome, Obesity, and Sleep apnea. Past Surgical History:  has a past surgical history that includes Tonsillectomy; Adenoidectomy; and [place] tooth extraction. Social History:  reports that he has never smoked. He has never used smokeless tobacco. He reports that he does not drink alcohol and does not use drugs. Family History: family history is not on file. The patients home medications have been reviewed. Allergies: Patient has no known allergies. -------------------------------------------------- RESULTS -------------------------------------------------  All laboratory and radiology results have been personally reviewed by myself   LABS:  No results found for this visit on 07/07/21. RADIOLOGY:  Interpreted by Radiologist.  XR HAND LEFT (MIN 3 VIEWS)   Final Result   No acute osseous or soft tissue findings seen about the left hand at this   time. Normal alignment.       RECOMMENDATION:   In the setting of trauma, if there is persistent symptoms and physical exam   warrants a repeat radiograph in 10-14 days could be considered as occult   fractures may not be evident on initial imaging evaluation.             ------------------------- NURSING NOTES AND VITALS REVIEWED ---------------------------   The nursing notes within the ED encounter and vital signs as below have been reviewed. BP (!) 159/92   Pulse 92   Temp 99.3 °F (37.4 °C) (Infrared)   Resp 18   Ht 5' 7\" (1.702 m)   Wt (!) 320 lb (145.2 kg)   SpO2 98%   BMI 50.12 kg/m²   Oxygen Saturation Interpretation: Normal      ---------------------------------------------------PHYSICAL EXAM--------------------------------------      Constitutional/General: Alert and oriented x3, well appearing, non toxic in NAD  Head: Normocephalic and atraumatic  Eyes: PERRL, EOMI  Mouth: Oropharynx clear, handling secretions, no trismus  Neck: Supple, full ROM,   Pulmonary: Lungs clear to auscultation bilaterally, no wheezes, rales, or rhonchi. Not in respiratory distress  Cardiovascular:  Regular rate and rhythm, no murmurs, gallops, or rubs. 2+ distal pulses  Abdomen: Soft, non tender, non distended,   Extremities: Moves all extremities x 4. Warm and well perfused. Tenderness to palpation diffusely throughout the left ring finger. He does have slightly limited range of motion of this digit secondary to pain. No swelling, ecchymosis, or erythema noted. Skin: warm and dry without rash  Neurologic: GCS 15,  Psych: Normal Affect      ------------------------------ ED COURSE/MEDICAL DECISION MAKING----------------------  Medications   naproxen (NAPROSYN) tablet 500 mg (has no administration in time range)         ED COURSE:  ED Course as of Jul 07 2204   Wed Jul 07, 2021 2156 Reassessed patient. Remained stable neurovascularly intact. Discussed results with patient and his wife. At this time there is no acute pathology noted on imaging studies. Will po tape fingers together to add support. Provide pain medication.   Recommend close follow-up with PCP for recheck return the emergency department with any new or worsening symptoms. Patient voiced understanding is agreeable to the above treatment plan. [MS]      ED Course User Index  [MS] Balbir Hodges       Medical Decision Making:    See ED course above. Counseling: The emergency provider has spoken with the patient and spouse/SO and discussed todays results, in addition to providing specific details for the plan of care and counseling regarding the diagnosis and prognosis. Questions are answered at this time and they are agreeable with the plan.      --------------------------------- IMPRESSION AND DISPOSITION ---------------------------------    IMPRESSION  1. Sprain of left ring finger, unspecified site of digit, initial encounter        DISPOSITION  Disposition: Discharge to home  Patient condition is stable      NOTE: This report was transcribed using voice recognition software. Every effort was made to ensure accuracy; however, inadvertent computerized transcription errors may be present        Balbir Hodges  07/07/21 8204      ATTENDING PROVIDER ATTESTATION:     Supervising Physician, on-site, available for consultation, non-participatory in the evaluation or care of this patient.          [facility],   07/08/21 4923

## 2021-09-12 ENCOUNTER — HOSPITAL ENCOUNTER (EMERGENCY)
Age: 21
Discharge: HOME | End: 2021-09-12
Payer: COMMERCIAL

## 2021-09-12 ENCOUNTER — APPOINTMENT (OUTPATIENT)
Dept: GENERAL RADIOLOGY | Age: 21
End: 2021-09-12
Payer: COMMERCIAL

## 2021-09-12 VITALS
WEIGHT: 315 LBS | TEMPERATURE: 98 F | HEIGHT: 67 IN | DIASTOLIC BLOOD PRESSURE: 94 MMHG | OXYGEN SATURATION: 99 % | SYSTOLIC BLOOD PRESSURE: 144 MMHG | RESPIRATION RATE: 18 BRPM | HEART RATE: 92 BPM | BODY MASS INDEX: 49.44 KG/M2

## 2021-09-12 DIAGNOSIS — S16.1XXA STRAIN OF NECK MUSCLE, INITIAL ENCOUNTER: ICD-10-CM
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
## 2021-09-12 DIAGNOSIS — S39.012A STRAIN OF LUMBAR REGION, INITIAL ENCOUNTER: ICD-10-CM

## 2021-09-12 PROCEDURE — 99283 EMERGENCY DEPT VISIT LOW MDM: CPT

## 2021-09-12 PROCEDURE — 72040 X-RAY EXAM NECK SPINE 2-3 VW: CPT

## 2021-09-12 PROCEDURE — 72100 X-RAY EXAM L-S SPINE 2/3 VWS: CPT

## 2021-09-12 RX ORDER — NAPROXEN 500 MG/1
500 TABLET ORAL 2 TIMES DAILY
Qty: 14 TABLET | Refills: 0 | Status: SHIPPED | OUTPATIENT
Start: 2021-09-12 | End: 2022-02-21

## 2021-09-12 ASSESSMENT — PAIN SCALES - GENERAL: PAINLEVEL_OUTOF10: 8

## 2021-09-12 ASSESSMENT — PAIN DESCRIPTION - LOCATION: LOCATION: HEAD;BACK;NECK

## 2021-09-12 ASSESSMENT — PAIN DESCRIPTION - PAIN TYPE: TYPE: ACUTE PAIN
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
## 2021-09-20 ENCOUNTER — HOSPITAL ENCOUNTER (OUTPATIENT)
Age: 21
Discharge: HOME | End: 2021-09-20
Payer: COMMERCIAL

## 2021-09-20 LAB
HCT VFR BLD CALC: 47.2 % (ref 37–54)
HEMOGLOBIN: 15.4 G/DL (ref 12.5–16.5)
MCH RBC QN AUTO: 29.1 PG (ref 26–35)
MCHC RBC AUTO-ENTMCNC: 32.6 % (ref 32–34.5)
MCV RBC AUTO: 89.2 FL (ref 80–99.9)
PDW BLD-RTO: 13.2 FL (ref 11.5–15)
PLATELET # BLD: 300 E9/L (ref 130–450)
PMV BLD AUTO: 10.6 FL (ref 7–12)
RBC # BLD: 5.29 E12/L (ref 3.8–5.8)
WBC # BLD: 14.5 E9/L (ref 4.5–11.5)

## 2021-09-20 PROCEDURE — 85027 COMPLETE CBC AUTOMATED: CPT

## 2021-09-20 PROCEDURE — 36415 COLL VENOUS BLD VENIPUNCTURE: CPT

## 2021-10-04 ENCOUNTER — HOSPITAL ENCOUNTER (OUTPATIENT)
Age: 21
Discharge: HOME | End: 2021-10-04
Payer: COMMERCIAL

## 2021-10-04 LAB
ALBUMIN SERPL-MCNC: 4.3 G/DL (ref 3.5–5.2)
ALP BLD-CCNC: 124 U/L (ref 40–129)
ALT SERPL-CCNC: 36 U/L (ref 0–40)
ANION GAP SERPL CALCULATED.3IONS-SCNC: 11 MMOL/L (ref 7–16)
AST SERPL-CCNC: 29 U/L (ref 0–39)
BILIRUB SERPL-MCNC: 0.3 MG/DL (ref 0–1.2)
BUN BLDV-MCNC: 12 MG/DL (ref 6–20)
CALCIUM SERPL-MCNC: 9.7 MG/DL (ref 8.6–10.2)
CHLORIDE BLD-SCNC: 100 MMOL/L (ref 98–107)
CO2: 24 MMOL/L (ref 22–29)
CREAT SERPL-MCNC: 0.7 MG/DL (ref 0.7–1.2)
GFR AFRICAN AMERICAN: >60
GFR NON-AFRICAN AMERICAN: >60 ML/MIN/1.73
GLUCOSE BLD-MCNC: 95 MG/DL (ref 74–99)
HBA1C MFR BLD: 5.7 % (ref 4–5.6)
POTASSIUM SERPL-SCNC: 4.7 MMOL/L (ref 3.5–5)
SODIUM BLD-SCNC: 135 MMOL/L (ref 132–146)
TOTAL PROTEIN: 7.7 G/DL (ref 6.4–8.3)
VITAMIN D 25-HYDROXY: 22 NG/ML (ref 30–100)

## 2021-10-04 PROCEDURE — 82306 VITAMIN D 25 HYDROXY: CPT

## 2021-10-04 PROCEDURE — 83036 HEMOGLOBIN GLYCOSYLATED A1C: CPT

## 2021-10-04 PROCEDURE — 36415 COLL VENOUS BLD VENIPUNCTURE: CPT

## 2021-10-04 PROCEDURE — 80053 COMPREHEN METABOLIC PANEL: CPT

## 2021-11-19 DIAGNOSIS — R20.2 NUMBNESS AND TINGLING: Primary | ICD-10-CM

## 2021-11-19 DIAGNOSIS — R20.0 NUMBNESS AND TINGLING: Primary | ICD-10-CM

## 2021-11-22 ENCOUNTER — OFFICE VISIT (OUTPATIENT)
Dept: PHYSICAL MEDICINE AND REHAB | Age: 21
End: 2021-11-22
Payer: COMMERCIAL

## 2021-11-22 VITALS — BODY MASS INDEX: 49.44 KG/M2 | WEIGHT: 315 LBS | HEIGHT: 67 IN

## 2021-11-22 DIAGNOSIS — R20.2 NUMBNESS AND TINGLING: Primary | ICD-10-CM

## 2021-11-22 DIAGNOSIS — R20.2 NUMBNESS AND TINGLING: ICD-10-CM

## 2021-11-22 DIAGNOSIS — R20.0 NUMBNESS AND TINGLING: ICD-10-CM

## 2021-11-22 DIAGNOSIS — R20.0 NUMBNESS AND TINGLING: Primary | ICD-10-CM

## 2021-11-22 PROCEDURE — 99203 OFFICE O/P NEW LOW 30 MIN: CPT | Performed by: PHYSICAL MEDICINE & REHABILITATION

## 2021-11-22 PROCEDURE — G8484 FLU IMMUNIZE NO ADMIN: HCPCS | Performed by: PHYSICAL MEDICINE & REHABILITATION

## 2021-11-22 PROCEDURE — 95912 NRV CNDJ TEST 11-12 STUDIES: CPT | Performed by: PHYSICAL MEDICINE & REHABILITATION

## 2021-11-22 PROCEDURE — 95886 MUSC TEST DONE W/N TEST COMP: CPT | Performed by: PHYSICAL MEDICINE & REHABILITATION

## 2021-11-22 PROCEDURE — G8428 CUR MEDS NOT DOCUMENT: HCPCS | Performed by: PHYSICAL MEDICINE & REHABILITATION

## 2021-11-22 PROCEDURE — G8417 CALC BMI ABV UP PARAM F/U: HCPCS | Performed by: PHYSICAL MEDICINE & REHABILITATION
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
## 2021-11-22 NOTE — PATIENT INSTRUCTIONS
Electrodiagnotic Laboratory  Accredited by the [facility] with Exemplary status  SARAH Vasquez D.O. Formerly [facility], later [facility]  [address] [place]  Phone: [phone]  Fax: [phone]        Today you had an electrodiagnostic exam which included nerve conduction studies (NCS) and electromyography (EMG). This test evaluated the electrical activity of your nerves and muscles to help determine if you have a nerve or muscle disease. This test can help determine the location and type of a nerve or muscle problem. This will help your referring doctor diagnose your condition and determine the appropriate next step in your treatment plan. After your test:    1. There are no long lasting side effects of the test.     2. You may resume your normal activities without restrictions. 3.  Resume any medications that were stopped for the test.     4  If you have sore areas or bruising in your muscles where the needle was placed, apply a cold pack to the sore area for 15-20 minutes three to four times a day as needed for pain. The soreness should go away in about 1-2 days. 5. Your results were provided  Briefly at the end of your test and the final detailed report will be provided to your referring physician, and/or primary care physician and any other parties you requested within 1-2 days of the examination. You may wish to contact your referring provider after a few days to determine what they would like you to do next. 6.  Please call [phone] with any questions or concerns and if you develop increased body temperature/fever, swelling, tenderness, increased pain and/or drainage from the sites where the needle was placed. Thank you for choosing us for your health care needs.

## 2021-12-10 ENCOUNTER — HOSPITAL ENCOUNTER (EMERGENCY)
Age: 21
Discharge: HOME | End: 2021-12-11
Payer: COMMERCIAL

## 2021-12-10 VITALS
OXYGEN SATURATION: 98 % | HEART RATE: 110 BPM | RESPIRATION RATE: 18 BRPM | SYSTOLIC BLOOD PRESSURE: 160 MMHG | TEMPERATURE: 97 F | DIASTOLIC BLOOD PRESSURE: 75 MMHG

## 2021-12-10 DIAGNOSIS — S81.811A LACERATION OF RIGHT LOWER EXTREMITY, INITIAL ENCOUNTER: Primary | ICD-10-CM

## 2021-12-10 PROCEDURE — 12002 RPR S/N/AX/GEN/TRNK2.6-7.5CM: CPT

## 2021-12-10 PROCEDURE — 99283 EMERGENCY DEPT VISIT LOW MDM: CPT

## 2021-12-10 ASSESSMENT — ENCOUNTER SYMPTOMS
CHEST TIGHTNESS: 0
COUGH: 0
COLOR CHANGE: 0
SHORTNESS OF BREATH: 0

## 2021-12-10 ASSESSMENT — PAIN DESCRIPTION - PAIN TYPE: TYPE: ACUTE PAIN

## 2021-12-10 ASSESSMENT — PAIN DESCRIPTION - DESCRIPTORS: DESCRIPTORS: BURNING

## 2021-12-10 ASSESSMENT — PAIN - FUNCTIONAL ASSESSMENT: PAIN_FUNCTIONAL_ASSESSMENT: ACTIVITIES ARE NOT PREVENTED

## 2021-12-10 ASSESSMENT — PAIN DESCRIPTION - FREQUENCY: FREQUENCY: INTERMITTENT

## 2021-12-10 ASSESSMENT — PAIN DESCRIPTION - ONSET: ONSET: SUDDEN

## 2021-12-10 ASSESSMENT — PAIN DESCRIPTION - LOCATION: LOCATION: LEG

## 2021-12-10 ASSESSMENT — PAIN DESCRIPTION - PROGRESSION: CLINICAL_PROGRESSION: NOT CHANGED

## 2021-12-10 ASSESSMENT — PAIN SCALES - GENERAL: PAINLEVEL_OUTOF10: 9

## 2021-12-11 PROCEDURE — 90715 TDAP VACCINE 7 YRS/> IM: CPT | Performed by: PHYSICIAN ASSISTANT

## 2021-12-11 PROCEDURE — 90471 IMMUNIZATION ADMIN: CPT | Performed by: PHYSICIAN ASSISTANT

## 2021-12-11 PROCEDURE — 6360000002 HC RX W HCPCS: Performed by: PHYSICIAN ASSISTANT

## 2021-12-11 RX ADMIN — TETANUS TOXOID, REDUCED DIPHTHERIA TOXOID AND ACELLULAR PERTUSSIS VACCINE, ADSORBED 0.5 ML: 5; 2.5; 8; 8; 2.5 SUSPENSION INTRAMUSCULAR at 00:19

## 2021-12-11 NOTE — ED PROVIDER NOTES
[facility]        Department of Emergency Medicine   ED  Provider Note  Admit Date/RoomTime: 12/10/2021 11:40 PM  ED Room: [address]  HPI:  12/10/21, Time: 11:40 PM EST      Patient is a 58-year-old male presenting the emergency department a laceration to his right leg. Patient states that he hit it on a metal bed frame. Patient states he is unsure of his last tetanus. He was able to stop the bleeding prior to arrival.  He denies any excessive bleeding, numbness or tingling, difficulty walking. The history is provided by the patient. No  was used. REVIEW OF SYSTEMS:  Review of Systems   Constitutional: Negative for activity change, chills, fatigue and fever. HENT: Negative for dental problem. Respiratory: Negative for cough, chest tightness and shortness of breath. Cardiovascular: Negative for chest pain, palpitations and leg swelling. Musculoskeletal: Negative for arthralgias, myalgias, neck pain and neck stiffness. Skin: Positive for wound. Negative for color change, pallor and rash. Neurological: Negative for dizziness, weakness, light-headedness and headaches. Hematological: Does not bruise/bleed easily. Psychiatric/Behavioral: Negative for agitation, behavioral problems and confusion. Pertinent positives and negatives are stated within HPI, all other systems reviewed and are negative.      --------------------------------------------- PAST HISTORY ---------------------------------------------  Past Medical History:  has a past medical history of Arthritis, Asperger syndrome, Asthma, Obesity, and Sleep apnea. Past Surgical History:  has a past surgical history that includes Tonsillectomy; Adenoidectomy; and [place] tooth extraction. Social History:  reports that he has never smoked. He has never used smokeless tobacco. He reports that he does not drink alcohol and does not use drugs. Family History: family history is not on file. The patients home medications have been reviewed. Allergies: Patient has no known allergies. -------------------------------------------------- RESULTS -------------------------------------------------  All laboratory and radiology results have been personally reviewed by myself   LABS:  No results found for this visit on 12/10/21. RADIOLOGY:  Interpreted by Radiologist.  No orders to display       ------------------------- NURSING NOTES AND VITALS REVIEWED ---------------------------   The nursing notes within the ED encounter and vital signs as below have been reviewed. BP (!) 160/75   Pulse 110   Temp 97 °F (36.1 °C) (Infrared)   Resp 18   SpO2 98%   Oxygen Saturation Interpretation: Normal      ---------------------------------------------------PHYSICAL EXAM--------------------------------------    Physical Exam  Vitals and nursing note reviewed. Constitutional:       General: He is not in acute distress. Appearance: Normal appearance. He is well-developed. He is not ill-appearing. HENT:      Head: Normocephalic and atraumatic. Mouth/Throat:      Mouth: Mucous membranes are moist.      Pharynx: Oropharynx is clear. Cardiovascular:      Rate and Rhythm: Normal rate and regular rhythm. Heart sounds: Normal heart sounds. No murmur heard. Pulmonary:      Effort: Pulmonary effort is normal. No respiratory distress. Breath sounds: Normal breath sounds. Musculoskeletal:         General: No swelling, tenderness or deformity. Normal range of motion. Cervical back: Normal range of motion and neck supple. No rigidity. Comments: 6 cm horizontal laceration across the anterior/distal right shin. Skin:     General: Skin is warm and dry. Capillary Refill: Capillary refill takes less than 2 seconds. Findings: No erythema. Neurological:      General: No focal deficit present. Mental Status: He is alert and oriented to person, place, and time.  Mental status is at baseline. Coordination: Coordination normal.   Psychiatric:         Mood and Affect: Mood normal.         Behavior: Behavior normal.         Thought Content: Thought content normal.            ------------------------------ ED COURSE/MEDICAL DECISION MAKING----------------------  Medications   Tetanus-Diphth-Acell Pertussis (BOOSTRIX) injection 0.5 mL (0.5 mLs IntraMUSCular Given 12/11/21 0019)         ED COURSE:       Procedures:  Lac Repair    Date/Time: 12/11/2021 12:14 AM  Performed by: Codi Mitchell PA-C  Authorized by: Codi Mitchell PA-C     Consent:     Consent obtained:  Verbal    Consent given by:  Patient    Risks discussed:  Infection, pain and retained foreign body    Alternatives discussed:  No treatment  Anesthesia (see MAR for exact dosages): Anesthesia method:  Local infiltration    Local anesthetic:  Lidocaine 1% w/o epi  Laceration details:     Location:  Leg    Leg location:  R lower leg    Length (cm):  6  Repair type:     Repair type:  Simple  Pre-procedure details:     Preparation:  Patient was prepped and draped in usual sterile fashion  Exploration:     Hemostasis achieved with:  Direct pressure    Wound exploration: wound explored through full range of motion      Contaminated: no    Treatment:     Area cleansed with:  Saline and Betadine    Amount of cleaning:  Standard    Irrigation solution:  Sterile saline    Irrigation method:  Syringe    Visualized foreign bodies/material removed: no    Skin repair:     Repair method:  Sutures    Suture size:  4-0    Suture material:  Prolene    Suture technique:  Simple interrupted    Number of sutures:  8  Approximation:     Approximation:  Close  Post-procedure details:     Dressing:  Antibiotic ointment and non-adherent dressing    Patient tolerance of procedure:   Tolerated well, no immediate complications         Medical Decision Making:   MDM   43-year-old male presenting the ED with a laceration to his right shin after hitting of his bed frame. The wound is overall very benign appearing. He has no signs of neurovascular compromise. It was irrigated and repaired without difficulty. He is educated on proper wound care and discharged home in good condition. Patient was updated on his tetanus in the ED. Counseling: The emergency provider has spoken with the patient and discussed todays results, in addition to providing specific details for the plan of care and counseling regarding the diagnosis and prognosis. Questions are answered at this time and they are agreeable with the plan.      --------------------------------- IMPRESSION AND DISPOSITION ---------------------------------    IMPRESSION  1. Laceration of right lower extremity, initial encounter        DISPOSITION  Disposition: Discharge to home  Patient condition is good      Electronically signed by Tonny Dowling PA-C   DD: 12/10/21  **This report was transcribed using voice recognition software. Every effort was made to ensure accuracy; however, inadvertent computerized transcription errors may be present.   END OF ED PROVIDER NOTE         Tonny Dowling PA-C  12/11/21 6538

## 2022-01-06 ENCOUNTER — HOSPITAL ENCOUNTER (EMERGENCY)
Age: 22
Discharge: HOME | End: 2022-01-06
Payer: COMMERCIAL

## 2022-01-06 VITALS
BODY MASS INDEX: 54.82 KG/M2 | HEART RATE: 86 BPM | SYSTOLIC BLOOD PRESSURE: 150 MMHG | OXYGEN SATURATION: 98 % | DIASTOLIC BLOOD PRESSURE: 90 MMHG | RESPIRATION RATE: 24 BRPM | WEIGHT: 315 LBS | TEMPERATURE: 97.9 F

## 2022-01-06 DIAGNOSIS — Z51.89 ENCOUNTER FOR WOUND RE-CHECK: Primary | ICD-10-CM

## 2022-01-06 DIAGNOSIS — S81.811D LEG LACERATION, RIGHT, SUBSEQUENT ENCOUNTER: ICD-10-CM

## 2022-01-06 PROCEDURE — 99282 EMERGENCY DEPT VISIT SF MDM: CPT

## 2022-01-06 RX ORDER — CEFUROXIME AXETIL 500 MG/1
500 TABLET ORAL 2 TIMES DAILY
Qty: 14 TABLET | Refills: 0 | Status: SHIPPED | OUTPATIENT
Start: 2022-01-06 | End: 2022-01-13

## 2022-01-07 NOTE — ED PROVIDER NOTES
[facility]  Department of Emergency Medicine   ED  Encounter Note  Admit Date/RoomTime: 2022  8:55 PM  [facility]    NAME: David Cramer : 2000  MRN: [id number]     Chief Complaint:  Wound Check (lac sutered on 12/10, open area)    History of Present Illness       David Cramer is a 24 y.o. old male who presents to the emergency department for evaluation of a right leg laceration that occurred on December 10. At that time he was evaluated at [facility] in [facility]. He had sutures placed, his tetanus boosted and he admits he had his father remove the sutures instead of going to a healthcare professional.  He states his documentation sent for them to be removed in 7 days so that is when he had them taken out. When the stitches were removed the laceration opened back up and it has remained open since then. He has since followed up with his primary care provider at [place]  and was prescribed mupirocin ointment which he has been using in addition to showering daily. Recently he started noticing increasing redness around the wound and some tenderness causing him to come in for reevaluation. He has not noticed any fevers, chills, drainage or red streaking up the leg however the wound has not closed which is his concern. He does admit to being prediabetic. His symptoms are mild in severity and persistent in nature. **Informed Consent**    The patient has given verbal consent to have photos taken of right leg and electronically inserted into their ED Provider Note as part of their permanent medical record for purposes of illustration, documentation, treatment management and/or medical review.      All Images taken on 22 of patient name: David Cramer were taken by a [facility] approved registered mobile device via [employer] mobile application and transmitted then stored on a secured Epic aid.  Neurological:  Alert and oriented. Motor and sensory functions intact unless otherwise described above. Lab / Imaging Results   (All laboratory and radiology results have been personally reviewed by myself)  Labs:  No results found for this visit on 01/06/22. Imaging: All Radiology results interpreted by Radiologist unless otherwise noted. No orders to display     ED Course / Medical Decision Making   Medications - No data to display     Consults:   None    Procedure(s):  None    MDM:      Neurovascularly intact. TDAP UTD. Patient's wound apparently dehisced after his father took out his own sutures after 7 days of being placed. Patient is aware it will now need to heal by secondary intention as this occurred back in mid December. Wound cleaned and dressed. Wound care discussed. Plan is for BID wound care, topical ABX which he has bactroban at home from his PCP, OTC tylenol/motrin as needed and Ceftin BID. Patient to call his PCP tomorrow for follow up and given [facility] wound care contact information although he is aware he may require a formal referral from his PCP. Patient is aware of S/S indicative of re-evaluation in the ER setting. Patient verbalizes understanding of instructions and departed in stable condition. Plan of Care/Counseling:  CATIE Roa CNP reviewed today's visit with the patient in addition to providing specific details for the plan of care and counseling regarding the diagnosis and prognosis. Questions are answered at this time and are agreeable with the plan. Assessment      1. Encounter for wound re-check    2. Leg laceration, right, subsequent encounter      Plan   Discharged home.   Patient condition is stable    New Medications     New Prescriptions    CEFUROXIME (CEFTIN) 500 MG TABLET    Take 1 tablet by mouth 2 times daily for 7 days     Electronically signed by CATIE Roa CNP   DD: 1/6/22  **This report was transcribed using voice recognition software. Every effort was made to ensure accuracy; however, inadvertent computerized transcription errors may be present.   END OF ED PROVIDER NOTE       CATIE Wilkins - MAMADOU  01/06/22 0958

## 2022-01-24 ENCOUNTER — HOSPITAL ENCOUNTER (OUTPATIENT)
Dept: WOUND CARE | Age: 22
Discharge: HOME | End: 2022-01-24

## 2022-01-31 ENCOUNTER — HOSPITAL ENCOUNTER (OUTPATIENT)
Dept: WOUND CARE | Age: 22
Discharge: HOME | End: 2022-01-31
Payer: COMMERCIAL

## 2022-01-31 VITALS
SYSTOLIC BLOOD PRESSURE: 148 MMHG | HEIGHT: 67 IN | WEIGHT: 315 LBS | BODY MASS INDEX: 49.44 KG/M2 | DIASTOLIC BLOOD PRESSURE: 84 MMHG | HEART RATE: 91 BPM | RESPIRATION RATE: 22 BRPM | TEMPERATURE: 98.8 F

## 2022-01-31 PROCEDURE — 87075 CULTR BACTERIA EXCEPT BLOOD: CPT

## 2022-01-31 PROCEDURE — 6370000000 HC RX 637 (ALT 250 FOR IP): Performed by: PODIATRIST

## 2022-01-31 PROCEDURE — 11042 DBRDMT SUBQ TIS 1ST 20SQCM/<: CPT

## 2022-01-31 PROCEDURE — 87070 CULTURE OTHR SPECIMN AEROBIC: CPT

## 2022-01-31 RX ORDER — LIDOCAINE 50 MG/G
OINTMENT TOPICAL ONCE
Status: CANCELLED | OUTPATIENT
Start: 2022-01-31 | End: 2022-01-31

## 2022-01-31 RX ORDER — LIDOCAINE HYDROCHLORIDE 40 MG/ML
SOLUTION TOPICAL ONCE
Status: COMPLETED | OUTPATIENT
Start: 2022-01-31 | End: 2022-01-31

## 2022-01-31 RX ORDER — BETAMETHASONE DIPROPIONATE 0.05 %
OINTMENT (GRAM) TOPICAL ONCE
Status: CANCELLED | OUTPATIENT
Start: 2022-01-31 | End: 2022-01-31

## 2022-01-31 RX ORDER — BACITRACIN, NEOMYCIN, POLYMYXIN B 400; 3.5; 5 [USP'U]/G; MG/G; [USP'U]/G
OINTMENT TOPICAL ONCE
Status: CANCELLED | OUTPATIENT
Start: 2022-01-31 | End: 2022-01-31

## 2022-01-31 RX ORDER — CLOBETASOL PROPIONATE 0.5 MG/G
OINTMENT TOPICAL ONCE
Status: CANCELLED | OUTPATIENT
Start: 2022-01-31 | End: 2022-01-31

## 2022-01-31 RX ORDER — LIDOCAINE HYDROCHLORIDE 20 MG/ML
JELLY TOPICAL ONCE
Status: CANCELLED | OUTPATIENT
Start: 2022-01-31 | End: 2022-01-31

## 2022-01-31 RX ORDER — LIDOCAINE 40 MG/G
CREAM TOPICAL ONCE
Status: CANCELLED | OUTPATIENT
Start: 2022-01-31 | End: 2022-01-31

## 2022-01-31 RX ORDER — BACITRACIN ZINC AND POLYMYXIN B SULFATE 500; 1000 [USP'U]/G; [USP'U]/G
OINTMENT TOPICAL ONCE
Status: CANCELLED | OUTPATIENT
Start: 2022-01-31 | End: 2022-01-31

## 2022-01-31 RX ORDER — GINSENG 100 MG
CAPSULE ORAL ONCE
Status: CANCELLED | OUTPATIENT
Start: 2022-01-31 | End: 2022-01-31

## 2022-01-31 RX ORDER — SULFAMETHOXAZOLE AND TRIMETHOPRIM 800; 160 MG/1; MG/1
1 TABLET ORAL 2 TIMES DAILY
COMMUNITY
Start: 2022-01-31 | End: 2022-02-10

## 2022-01-31 RX ORDER — LIDOCAINE HYDROCHLORIDE 40 MG/ML
SOLUTION TOPICAL ONCE
Status: CANCELLED | OUTPATIENT
Start: 2022-01-31 | End: 2022-01-31

## 2022-01-31 RX ORDER — GENTAMICIN SULFATE 1 MG/G
OINTMENT TOPICAL ONCE
Status: CANCELLED | OUTPATIENT
Start: 2022-01-31 | End: 2022-01-31

## 2022-01-31 RX ADMIN — LIDOCAINE HYDROCHLORIDE 3 ML: 40 SOLUTION TOPICAL at 10:30

## 2022-01-31 NOTE — PROGRESS NOTES
Wound Healing Center  History and Physical/Consultation  Podiatry    Referring Physician : CATIE Burton NP  Coleen Harp. MEDICAL RECORD NUMBER:  [id number]  AGE: 24 y.o. GENDER: male  : 2000  EPISODE DATE:  2022  Subjective:     Chief Complaint   Patient presents with    Wound Check     right leg         HISTORY of PRESENT ILLNESS HPI     Coleen Harp. is a 24 y.o. male who presents today for wound/ulcer evaluation. History of Wound Context:  The patient has had a wound of Right leg which was first noted approximately >1 month. This has been treated Sutures closure. On their initial visit to the wound healing center, 22 ,  the patient has noted that the wound has not been improving. The patient has not had similar previous wounds in the past.      Pt is not on abx at time of initial visit. Wound/Ulcer Pain Timing/Severity: moderate  Quality of pain: aching  Severity:  3 / 10   Modifying Factors: Pain worsens with walking  Associated Signs/Symptoms: edema    Ulcer Identification:  Ulcer Type: traumatic  Contributing Factors: edema    Diabetic/Pressure/Non Pressure Ulcers onl y:  Ulcer: Non-Pressure ulcer, fat layer exposed    If patient has diabetic lower extremity wounds  Church Classification of diabetic lower extremity wounds:    Grade Description   []  0 No open wound   [x]  1 Superficial ulcer involving the full skin thickness   []  2 Deep ulcer involves ligament, tendon, joint capsule, or fascia  No bone involvement or abscess presence   []  3 Deep Ulcer with abcess formation and/or osteomyelitis   []  4 Localized gangrene   []  5 Extensive gangrene of the foot     Wound: N/A        PAST MEDICAL HISTORY      Diagnosis Date    Arthritis     Asperger syndrome     Asthma     Obesity     Sleep apnea      Past Surgical History:   Procedure Laterality Date    ADENOIDECTOMY      TONSILLECTOMY      WISDOM TOOTH EXTRACTION       History reviewed.  No pertinent family history. Social History     Tobacco Use    Smoking status: Never Smoker    Smokeless tobacco: Never Used   Vaping Use    Vaping Use: Every day    Substances: Nicotine, not ready to quit   Substance Use Topics    Alcohol use: Never    Drug use: Never     No Known Allergies  Current Outpatient Medications on File Prior to Encounter   Medication Sig Dispense Refill    pimozide (ORAP) 2 MG tablet Take by mouth      sertraline (ZOLOFT) 25 MG tablet       naproxen (NAPROSYN) 500 MG tablet Take 1 tablet by mouth 2 times daily for 7 days 14 tablet 0    [DISCONTINUED] ibuprofen (ADVIL;MOTRIN) 600 MG tablet Take 1 tablet by mouth every 8 hours as needed for Pain 30 tablet 0    albuterol sulfate HFA (PROVENTIL HFA) 108 (90 Base) MCG/ACT inhaler Inhale 2 puffs into the lungs every 4 hours as needed for Wheezing 1 Inhaler zero    pantoprazole (PROTONIX) 40 MG tablet Take 40 mg by mouth daily       No current facility-administered medications on file prior to encounter.        REVIEW OF SYSTEMS   ROS : All others Negative if blank [], Positive if [x]  General Vascular   [] Fevers [] Claudication   [] Chills [] Rest Pain   Skin Neurologic   [x] Tissue Loss [] Lower extremity neuropathy     Objective:    BP (!) 148/84   Pulse 91   Temp 98.8 °F (37.1 °C) (Temporal)   Resp 22   Ht 5' 7\" (1.702 m)   Wt (!) 350 lb (158.8 kg)   BMI 54.82 kg/m²   Wt Readings from Last 3 Encounters:   01/31/22 (!) 350 lb (158.8 kg)   01/06/22 (!) 350 lb (158.8 kg)   11/22/21 (!) 350 lb (158.8 kg)       PHYSICAL EXAM   CONSTITUTIONAL:   Awake, alert, cooperative   PSYCHIATRIC :  Oriented to time, place and person      normal insight to disease process  EXTREMITIES:   R LE Open wounds are noted   Skin color is nearly normal without any significant findings   Edema is  noted   Sensation intact to light touch   Palpation of the foot does not cause pain   5/5 strength DF/PF  L LE Open wounds are not noted   Skin color is normal   Edema is  noted   Sensation intact to light touch   Palpation of the foot does not cause pain   5/5 strength DF/PF  R dorsalis pedis +2 L dorsalis pedis +2   R posterior tibial +2 L posterior tibial +2     Assessment:     Problem List Items Addressed This Visit     None          Pre Debridement Measurements:  Are located in the [place]  Documentation Flow Sheet  Post Debridement Measurements:  Wound/Ulcer Descriptions are Pre Debridement except measurements:     Wound 01/31/22 Pretibial Right; Anterior #1 (Active)   Wound Image   01/31/22 1026   Wound Length (cm) 0.9 cm 01/31/22 1026   Wound Width (cm) 2.6 cm 01/31/22 1026   Wound Depth (cm) 0.1 cm 01/31/22 1026   Wound Surface Area (cm^2) 2.34 cm^2 01/31/22 1026   Wound Volume (cm^3) 0.234 cm^3 01/31/22 1026   Wound Assessment Eschar dry 01/31/22 1026   Drainage Amount None 01/31/22 1026   Odor None 01/31/22 1026   Torrie-wound Assessment Blanchable erythema; Intact 01/31/22 1026   Number of days: 0          Procedure Note  Indications:  Based on my examination of this patient's wound(s)/ulcer(s) today, debridement is required to promote healing and evaluate the wound base. Performed by: Marisela Cramer DPM    Consent obtained:  Yes    Time out taken:  Yes    Pain Control: Anesthetic  Anesthetic: 4% Lidocaine Liquid Topical     Debridement:Excisional Debridement    Using #15 blade scalpel the wound(s)/ulcer(s) was/were sharply debrided down through and including the removal of subcutaneous tissue. Devitalized Tissue Debrided:  slough and necrotic/eschar to stimulate bleeding to promote healing, post debridement good bleeding base and wound edges noted    Wound/Ulcer #: 1    Percent of Wound/Ulcer Debrided: 100%    Total Surface Area Debrided:  2.34 sq cm     Estimated Blood Loss:  None  Hemostasis Achieved:  by pressure    Procedural Pain:  4  / 10   Post Procedural Pain:  4 / 10     Response to treatment:  Well tolerated by patient. A culture was done. Plan:     Pt is not currently a smoker   - Discussed relationship of smoking and negative affects on wound healing   - Emphasized importance of tobacco avoidace/cessation   - Script for nicotine patch given to patient   - Information regarding support groups for smoking cessation given    In my professional opinion and based off the information that is available at this time this patient has appropriate indication for HBO Therapy: No    Treatment Note please see attached Discharge Instructions    Written patient dismissal instructions given to patient and signed by patient or POA. Discharge Instructions       Visit Discharge/Physician Orders    Discharge condition: Stable    Assessment of pain at discharge: minimal    Anesthetic used: 4$% lidocaine    Discharge to: Home    Left via:Private automobile    Accompanied by: n/a    ECF/HHA: [place] for dressing supplies    Dressing Orders: right leg: cleanse wound with normal saline, apply bactroban ointment to wound, cover with alginate and dry dressing. Change daily. Treatment Orders: Culture taken 1/31/22. Bactrim called into pharmacy. Keep wound covered at all times. [address] [place] [address],[address] followup visit ________1 week Dr parker_____________________  (Please note your next appointment above and if you are unable to keep, kindly give a 24 hour notice. Thank you.)    Physician signature:__________________________      If you experience any of the following, please call the [address] [place] [address] Road during business hours:    * Increase in Pain  * Temperature over 101  * Increase in drainage from your wound  * Drainage with a foul odor  * Bleeding  * Increase in swelling  * Need for compression bandage changes due to slippage, breakthrough drainage. If you need medical attention outside of the business hours of the [facility] [address] please contact your PCP or go to the nearest emergency room.         Electronically signed by Radha Braun DPM on 1/31/2022 at 10:48 AM

## 2022-01-31 NOTE — PROGRESS NOTES
[address]:     [facility] [address] [place], [facility]  p: [phone] f: [phone]:     [address]  [address]  [facility] [address]  [phone]  WOUND CARE Dept: [place] [address] [id number] [phone]    Patient Information:      Bjorn Zayda. [address]  Vanessa Herrera [id number]   : 2000  AGE: 24 y.o. GENDER: male   EPISODE DATE: 2022    Insurance:      PRIMARY INSURANCE:  Plan: CIGNA  Coverage: CIGNA  Effective Date: 2017  Group Number: [unfilled]  Subscriber Number: [id number] - (Commercial)    Payor/Plan Subscr  Sex Relation Sub. Ins. ID Effective Group Num   1. [place] 89 M 3[birth year]31/1968 Male Child [id number]                                   [id number]   2. Jerl Stephenie - Barton Plaster* 2000 Male Self [id number] [address]                                    [id number]       Patient Wound Information:      Problem List Items Addressed This Visit     None          WOUNDS REQUIRING DRESSING SUPPLIES:     Wound 22 Pretibial Right; Anterior #1 (Active)   Wound Image   22 1026   Dressing Status New dressing applied 22 1048   Wound Cleansed Cleansed with saline 22 1048   Dressing/Treatment Alginate;Dry dressing 22 1048   Offloading for Diabetic Foot Ulcers No offloading required 22 1048   Wound Length (cm) 0.9 cm 22 1026   Wound Width (cm) 2.6 cm 22 1026   Wound Depth (cm) 0.1 cm 22 1026   Wound Surface Area (cm^2) 2.34 cm^2 22 1026   Wound Volume (cm^3) 0.234 cm^3 22 1026   Wound Assessment Fibrin;Pink/red 22 1048   Drainage Amount Moderate 22 1048   Drainage Description Yellow;Serosanguinous;Purulent 22 1048   Odor None 22 1048   Torrie-wound Assessment Blanchable erythema 22 1048   Number of days: 0          Supplies

## 2022-02-02 LAB
ANAEROBIC CULTURE: NORMAL
ORGANISM: ABNORMAL
ORGANISM: ABNORMAL
WOUND/ABSCESS: ABNORMAL
WOUND/ABSCESS: ABNORMAL

## 2022-02-07 ENCOUNTER — HOSPITAL ENCOUNTER (OUTPATIENT)
Dept: WOUND CARE | Age: 22
Discharge: HOME | End: 2022-02-07
Payer: COMMERCIAL

## 2022-02-07 VITALS
BODY MASS INDEX: 54.82 KG/M2 | WEIGHT: 315 LBS | DIASTOLIC BLOOD PRESSURE: 74 MMHG | RESPIRATION RATE: 22 BRPM | SYSTOLIC BLOOD PRESSURE: 122 MMHG | TEMPERATURE: 98.7 F | HEART RATE: 99 BPM

## 2022-02-07 DIAGNOSIS — M77.9 TENDINITIS: Primary | ICD-10-CM

## 2022-02-07 PROCEDURE — 11042 DBRDMT SUBQ TIS 1ST 20SQCM/<: CPT

## 2022-02-07 PROCEDURE — 6370000000 HC RX 637 (ALT 250 FOR IP): Performed by: PODIATRIST

## 2022-02-07 RX ORDER — LIDOCAINE 40 MG/G
CREAM TOPICAL ONCE
Status: CANCELLED | OUTPATIENT
Start: 2022-02-07 | End: 2022-02-07

## 2022-02-07 RX ORDER — LIDOCAINE HYDROCHLORIDE 20 MG/ML
JELLY TOPICAL ONCE
Status: CANCELLED | OUTPATIENT
Start: 2022-02-07 | End: 2022-02-07

## 2022-02-07 RX ORDER — LIDOCAINE 50 MG/G
OINTMENT TOPICAL ONCE
Status: CANCELLED | OUTPATIENT
Start: 2022-02-07 | End: 2022-02-07

## 2022-02-07 RX ORDER — LIDOCAINE HYDROCHLORIDE 40 MG/ML
SOLUTION TOPICAL ONCE
Status: CANCELLED | OUTPATIENT
Start: 2022-02-07 | End: 2022-02-07

## 2022-02-07 RX ORDER — CLOBETASOL PROPIONATE 0.5 MG/G
OINTMENT TOPICAL ONCE
Status: CANCELLED | OUTPATIENT
Start: 2022-02-07 | End: 2022-02-07

## 2022-02-07 RX ORDER — BACITRACIN, NEOMYCIN, POLYMYXIN B 400; 3.5; 5 [USP'U]/G; MG/G; [USP'U]/G
OINTMENT TOPICAL ONCE
Status: CANCELLED | OUTPATIENT
Start: 2022-02-07 | End: 2022-02-07

## 2022-02-07 RX ORDER — LIDOCAINE HYDROCHLORIDE 40 MG/ML
SOLUTION TOPICAL ONCE
Status: COMPLETED | OUTPATIENT
Start: 2022-02-07 | End: 2022-02-07

## 2022-02-07 RX ORDER — GENTAMICIN SULFATE 1 MG/G
OINTMENT TOPICAL ONCE
Status: CANCELLED | OUTPATIENT
Start: 2022-02-07 | End: 2022-02-07

## 2022-02-07 RX ORDER — BACITRACIN ZINC AND POLYMYXIN B SULFATE 500; 1000 [USP'U]/G; [USP'U]/G
OINTMENT TOPICAL ONCE
Status: CANCELLED | OUTPATIENT
Start: 2022-02-07 | End: 2022-02-07

## 2022-02-07 RX ORDER — BETAMETHASONE DIPROPIONATE 0.05 %
OINTMENT (GRAM) TOPICAL ONCE
Status: CANCELLED | OUTPATIENT
Start: 2022-02-07 | End: 2022-02-07

## 2022-02-07 RX ORDER — GINSENG 100 MG
CAPSULE ORAL ONCE
Status: CANCELLED | OUTPATIENT
Start: 2022-02-07 | End: 2022-02-07

## 2022-02-07 RX ADMIN — LIDOCAINE HYDROCHLORIDE 10 ML: 40 SOLUTION TOPICAL at 09:49

## 2022-02-07 ASSESSMENT — PAIN DESCRIPTION - PAIN TYPE: TYPE: ACUTE PAIN

## 2022-02-07 ASSESSMENT — PAIN DESCRIPTION - LOCATION: LOCATION: LEG

## 2022-02-07 ASSESSMENT — PAIN DESCRIPTION - ONSET: ONSET: ON-GOING

## 2022-02-07 ASSESSMENT — PAIN SCALES - GENERAL: PAINLEVEL_OUTOF10: 3

## 2022-02-07 ASSESSMENT — PAIN DESCRIPTION - PROGRESSION: CLINICAL_PROGRESSION: NOT CHANGED

## 2022-02-07 ASSESSMENT — PAIN DESCRIPTION - FREQUENCY: FREQUENCY: INTERMITTENT

## 2022-02-07 ASSESSMENT — PAIN DESCRIPTION - DESCRIPTORS: DESCRIPTORS: SORE

## 2022-02-07 ASSESSMENT — PAIN - FUNCTIONAL ASSESSMENT: PAIN_FUNCTIONAL_ASSESSMENT: ACTIVITIES ARE NOT PREVENTED

## 2022-02-07 ASSESSMENT — PAIN DESCRIPTION - ORIENTATION: ORIENTATION: RIGHT

## 2022-02-07 NOTE — PROGRESS NOTES
[facility]aling Center  History and Physical/Consultation  Podiatry    Referring Physician : CATIE Buckner NP. MEDICAL RECORD NUMBER:  [id number]  AGE: 24 y.o. GENDER: male  : 2000  EPISODE DATE:  2022  Subjective:     Chief Complaint   Patient presents with    Wound Check     Right leg         HISTORY of PRESENT ILLNESS MAG Sumnerallen Priest. is a 24 y.o. male who presents today for wound/ulcer evaluation. History of Wound Context:  The patient has had a wound of Right leg which was first noted approximately >1 month. This has been treated Sutures closure. On their initial visit to the wound healing center, 22 ,  the patient has noted that the wound has not been improving. The patient has not had similar previous wounds in the past.      Pt is not on abx at time of initial visit. 22 - Continue ointment and Alginate. Wound improving.   FU 1 week      Wound/Ulcer Pain Timing/Severity: moderate  Quality of pain: aching  Severity:  3 / 10   Modifying Factors: Pain worsens with walking  Associated Signs/Symptoms: edema    Ulcer Identification:  Ulcer Type: traumatic  Contributing Factors: edema    Diabetic/Pressure/Non Pressure Ulcers onl y:  Ulcer: Non-Pressure ulcer, fat layer exposed    If patient has diabetic lower extremity wounds  Church Classification of diabetic lower extremity wounds:    Grade Description   []  0 No open wound   [x]  1 Superficial ulcer involving the full skin thickness   []  2 Deep ulcer involves ligament, tendon, joint capsule, or fascia  No bone involvement or abscess presence   []  3 Deep Ulcer with abcess formation and/or osteomyelitis   []  4 Localized gangrene   []  5 Extensive gangrene of the foot     Wound: N/A        PAST MEDICAL HISTORY      Diagnosis Date    Arthritis     Asperger syndrome     Asthma     Obesity     Sleep apnea      Past Surgical History:   Procedure Laterality Date    ADENOIDECTOMY      TONSILLECTOMY      WISDOM TOOTH EXTRACTION       History reviewed. No pertinent family history. Social History     Tobacco Use    Smoking status: Never Smoker    Smokeless tobacco: Never Used   Vaping Use    Vaping Use: Every day    Substances: Nicotine, not ready to quit   Substance Use Topics    Alcohol use: Never    Drug use: Never     No Known Allergies  Current Outpatient Medications on File Prior to Encounter   Medication Sig Dispense Refill    mupirocin (BACTROBAN) 2 % ointment Apply topically daily      sulfamethoxazole-trimethoprim (BACTRIM DS;SEPTRA DS) 800-160 MG per tablet Take 1 tablet by mouth 2 times daily      pimozide (ORAP) 2 MG tablet Take by mouth      sertraline (ZOLOFT) 25 MG tablet       naproxen (NAPROSYN) 500 MG tablet Take 1 tablet by mouth 2 times daily for 7 days 14 tablet 0    [DISCONTINUED] ibuprofen (ADVIL;MOTRIN) 600 MG tablet Take 1 tablet by mouth every 8 hours as needed for Pain 30 tablet 0    albuterol sulfate HFA (PROVENTIL HFA) 108 (90 Base) MCG/ACT inhaler Inhale 2 puffs into the lungs every 4 hours as needed for Wheezing 1 Inhaler zero    pantoprazole (PROTONIX) 40 MG tablet Take 40 mg by mouth daily       No current facility-administered medications on file prior to encounter.        REVIEW OF SYSTEMS   ROS : All others Negative if blank [], Positive if [x]  General Vascular   [] Fevers [] Claudication   [] Chills [] Rest Pain   Skin Neurologic   [x] Tissue Loss [] Lower extremity neuropathy     Objective:    /74   Pulse 99   Temp 98.7 °F (37.1 °C) (Tympanic)   Resp 22   Wt (!) 350 lb (158.8 kg)   BMI 54.82 kg/m²   Wt Readings from Last 3 Encounters:   02/07/22 (!) 350 lb (158.8 kg)   01/31/22 (!) 350 lb (158.8 kg)   01/06/22 (!) 350 lb (158.8 kg)       PHYSICAL EXAM   CONSTITUTIONAL:   Awake, alert, cooperative   PSYCHIATRIC :  Oriented to time, place and person      normal insight to disease process  EXTREMITIES:   R LE Open wounds are noted   Skin color is nearly normal without any significant findings   Edema is  noted   Sensation intact to light touch   Palpation of the foot does not cause pain   5/5 strength DF/PF  L LE Open wounds are not noted   Skin color is normal   Edema is  noted   Sensation intact to light touch   Palpation of the foot does not cause pain   5/5 strength DF/PF  R dorsalis pedis +2 L dorsalis pedis +2   R posterior tibial +2 L posterior tibial +2     Assessment:     Problem List Items Addressed This Visit     Tendinitis - Primary    Relevant Orders    Initiate Outpatient Wound Care Protocol          Pre Debridement Measurements:  Are located in the [place]  Documentation Flow Sheet  Post Debridement Measurements:  Wound/Ulcer Descriptions are Pre Debridement except measurements:     Wound 01/31/22 Pretibial Right; Anterior #1 (Active)   Wound Image   01/31/22 1026   Dressing Status New dressing applied 01/31/22 1048   Wound Cleansed Cleansed with saline 01/31/22 1048   Dressing/Treatment Alginate;Dry dressing 01/31/22 1048   Offloading for Diabetic Foot Ulcers No offloading required 01/31/22 1048   Wound Length (cm) 1 cm 02/07/22 0946   Wound Width (cm) 2.5 cm 02/07/22 0946   Wound Depth (cm) 0.3 cm 02/07/22 0946   Wound Surface Area (cm^2) 2.5 cm^2 02/07/22 0946   Change in Wound Size % (l*w) -6.84 02/07/22 0946   Wound Volume (cm^3) 0.75 cm^3 02/07/22 0946   Wound Healing % -221 02/07/22 0946   Post-Procedure Length (cm) 1.1 cm 02/07/22 0959   Post-Procedure Width (cm) 2.6 cm 02/07/22 0959   Post-Procedure Depth (cm) 0.4 cm 02/07/22 0959   Post-Procedure Surface Area (cm^2) 2.86 cm^2 02/07/22 0959   Post-Procedure Volume (cm^3) 1.144 cm^3 02/07/22 0959   Wound Assessment Fibrin;Pink/red 02/07/22 0946   Drainage Amount Moderate 02/07/22 0946   Drainage Description Yellow 02/07/22 0946   Odor None 02/07/22 0946   Torrie-wound Assessment Blanchable erythema 02/07/22 0946   Number of days: 6          Procedure Note  Indications:  Based on my examination of this patient's wound(s)/ulcer(s) today, debridement is required to promote healing and evaluate the wound base. Performed by: Ayse Vaz DPM    Consent obtained:  Yes    Time out taken:  Yes    Pain Control: Anesthetic  Anesthetic: 4% Lidocaine Liquid Topical     Debridement:Excisional Debridement    Using #15 blade scalpel the wound(s)/ulcer(s) was/were sharply debrided down through and including the removal of subcutaneous tissue. Devitalized Tissue Debrided:  slough and necrotic/eschar to stimulate bleeding to promote healing, post debridement good bleeding base and wound edges noted    Wound/Ulcer #: 1    Percent of Wound/Ulcer Debrided: 100%    Total Surface Area Debrided:  2.86 sq cm     Estimated Blood Loss:  None  Hemostasis Achieved:  by pressure    Procedural Pain:  4  / 10   Post Procedural Pain:  4 / 10     Response to treatment:  Well tolerated by patient. A culture was done. Plan:     Pt is not currently a smoker   - Discussed relationship of smoking and negative affects on wound healing   - Emphasized importance of tobacco avoidace/cessation   - Script for nicotine patch given to patient   - Information regarding support groups for smoking cessation given    In my professional opinion and based off the information that is available at this time this patient has appropriate indication for HBO Therapy: No    Treatment Note please see attached Discharge Instructions    Written patient dismissal instructions given to patient and signed by patient or POA.          Discharge Instructions       Visit Discharge/Physician Orders     Discharge condition: Stable     Assessment of pain at discharge: minimal     Anesthetic used: 4$% lidocaine     Discharge to: Home     Left via:Private automobile     Accompanied by: n/a     ECF/HHA: [facility] for dressing supplies     Dressing Orders: right leg: cleanse wound with normal saline, apply bactroban ointment to wound, cover with alginate and dry dressing. Change daily.     Treatment Orders: Continue antibiotic until finished.l Keep wound covered at all times.     [address] [place] [address],[address] followup visit ________1 week Dr parker_____________________  (Please note your next appointment above and if you are unable to keep, kindly give a 24 hour notice.  Thank you.)     Physician signature:__________________________        If you experience any of the following, please call the [facility] during business hours:     * Increase in Pain  * Temperature over 101  * Increase in drainage from your wound  * Drainage with a foul odor  * Bleeding  * Increase in swelling  * Need for compression bandage changes due to slippage, breakthrough drainage.     If you need medical attention outside of the business hours of the [facility] please contact your PCP or go to the nearest emergency room.                 Electronically signed by Pilar Llamas DPM on 2/7/2022 at 10:04 AM

## 2022-02-14 ENCOUNTER — HOSPITAL ENCOUNTER (OUTPATIENT)
Dept: WOUND CARE | Age: 22
Discharge: HOME | End: 2022-02-14
Payer: COMMERCIAL

## 2022-02-14 VITALS
BODY MASS INDEX: 54.82 KG/M2 | HEART RATE: 96 BPM | RESPIRATION RATE: 22 BRPM | TEMPERATURE: 98.1 F | SYSTOLIC BLOOD PRESSURE: 122 MMHG | WEIGHT: 315 LBS | DIASTOLIC BLOOD PRESSURE: 70 MMHG

## 2022-02-14 DIAGNOSIS — M77.9 TENDINITIS: Primary | ICD-10-CM

## 2022-02-14 PROCEDURE — 11042 DBRDMT SUBQ TIS 1ST 20SQCM/<: CPT

## 2022-02-14 PROCEDURE — 6370000000 HC RX 637 (ALT 250 FOR IP): Performed by: PODIATRIST

## 2022-02-14 RX ORDER — LIDOCAINE HYDROCHLORIDE 20 MG/ML
JELLY TOPICAL ONCE
Status: CANCELLED | OUTPATIENT
Start: 2022-02-14 | End: 2022-02-14

## 2022-02-14 RX ORDER — LIDOCAINE HYDROCHLORIDE 40 MG/ML
SOLUTION TOPICAL ONCE
Status: CANCELLED | OUTPATIENT
Start: 2022-02-14 | End: 2022-02-14

## 2022-02-14 RX ORDER — BACITRACIN, NEOMYCIN, POLYMYXIN B 400; 3.5; 5 [USP'U]/G; MG/G; [USP'U]/G
OINTMENT TOPICAL ONCE
Status: CANCELLED | OUTPATIENT
Start: 2022-02-14 | End: 2022-02-14

## 2022-02-14 RX ORDER — BACITRACIN ZINC AND POLYMYXIN B SULFATE 500; 1000 [USP'U]/G; [USP'U]/G
OINTMENT TOPICAL ONCE
Status: CANCELLED | OUTPATIENT
Start: 2022-02-14 | End: 2022-02-14

## 2022-02-14 RX ORDER — LIDOCAINE HYDROCHLORIDE 40 MG/ML
SOLUTION TOPICAL ONCE
Status: COMPLETED | OUTPATIENT
Start: 2022-02-14 | End: 2022-02-14

## 2022-02-14 RX ORDER — GINSENG 100 MG
CAPSULE ORAL ONCE
Status: CANCELLED | OUTPATIENT
Start: 2022-02-14 | End: 2022-02-14

## 2022-02-14 RX ORDER — LIDOCAINE 40 MG/G
CREAM TOPICAL ONCE
Status: CANCELLED | OUTPATIENT
Start: 2022-02-14 | End: 2022-02-14

## 2022-02-14 RX ORDER — LIDOCAINE 50 MG/G
OINTMENT TOPICAL ONCE
Status: CANCELLED | OUTPATIENT
Start: 2022-02-14 | End: 2022-02-14

## 2022-02-14 RX ORDER — CLOBETASOL PROPIONATE 0.5 MG/G
OINTMENT TOPICAL ONCE
Status: CANCELLED | OUTPATIENT
Start: 2022-02-14 | End: 2022-02-14

## 2022-02-14 RX ORDER — BETAMETHASONE DIPROPIONATE 0.05 %
OINTMENT (GRAM) TOPICAL ONCE
Status: CANCELLED | OUTPATIENT
Start: 2022-02-14 | End: 2022-02-14

## 2022-02-14 RX ORDER — GENTAMICIN SULFATE 1 MG/G
OINTMENT TOPICAL ONCE
Status: CANCELLED | OUTPATIENT
Start: 2022-02-14 | End: 2022-02-14

## 2022-02-14 RX ADMIN — LIDOCAINE HYDROCHLORIDE 10 ML: 40 SOLUTION TOPICAL at 08:44

## 2022-02-14 ASSESSMENT — PAIN SCALES - GENERAL: PAINLEVEL_OUTOF10: 0

## 2022-02-14 NOTE — PLAN OF CARE
Problem: Wound:  Goal: Will show signs of wound healing; wound closure and no evidence of infection  Description: Will show signs of wound healing; wound closure and no evidence of infection  Outcome: Met This Shift     Problem: Pressure Ulcer:  Goal: Signs of wound healing will improve  Description: Signs of wound healing will improve  Outcome: Met This Shift  Goal: Absence of new pressure ulcer  Description: Absence of new pressure ulcer  Outcome: Met This Shift  Goal: Will show no infection signs and symptoms  Description: Will show no infection signs and symptoms  Outcome: Met This Shift     Problem: Pain:  Goal: Pain level will decrease  Description: Pain level will decrease  Outcome: Met This Shift  Goal: Control of acute pain  Description: Control of acute pain  Outcome: Met This Shift  Goal: Control of chronic pain  Description: Control of chronic pain  Outcome: Met This Shift

## 2022-02-14 NOTE — PROGRESS NOTES
Wound Healing Center  History and Physical/Consultation  Podiatry    Referring Physician : Jasmin Maharaj APRN - NP  Alice Teague. MEDICAL RECORD NUMBER:  [id number]  AGE: 24 y.o. GENDER: male  : 2000  EPISODE DATE:  2022  Subjective:     Chief Complaint   Patient presents with    Wound Check     Right leg         HISTORY of PRESENT ILLNESS HPI     Alice Teague. is a 24 y.o. male who presents today for wound/ulcer evaluation. History of Wound Context:  The patient has had a wound of Right leg which was first noted approximately >1 month. This has been treated Sutures closure. On their initial visit to the wound healing center, 22 ,  the patient has noted that the wound has not been improving. The patient has not had similar previous wounds in the past.      Pt is not on abx at time of initial visit. 22 - Continue ointment and Alginate. Wound improving. FU 1 week  22 - Continue ointment and Alginate. Wound improving.   FU 1 week      Wound/Ulcer Pain Timing/Severity: moderate  Quality of pain: aching  Severity:  3 / 10   Modifying Factors: Pain worsens with walking  Associated Signs/Symptoms: edema    Ulcer Identification:  Ulcer Type: traumatic  Contributing Factors: edema    Diabetic/Pressure/Non Pressure Ulcers onl y:  Ulcer: Non-Pressure ulcer, fat layer exposed    If patient has diabetic lower extremity wounds  Church Classification of diabetic lower extremity wounds:    Grade Description   []  0 No open wound   [x]  1 Superficial ulcer involving the full skin thickness   []  2 Deep ulcer involves ligament, tendon, joint capsule, or fascia  No bone involvement or abscess presence   []  3 Deep Ulcer with abcess formation and/or osteomyelitis   []  4 Localized gangrene   []  5 Extensive gangrene of the foot     Wound: N/A        PAST MEDICAL HISTORY      Diagnosis Date    Arthritis     Asperger syndrome     Asthma     Obesity     Sleep apnea      Past Surgical findings   Edema is  noted   Sensation intact to light touch   Palpation of the foot does not cause pain   5/5 strength DF/PF  L LE Open wounds are not noted   Skin color is normal   Edema is  noted   Sensation intact to light touch   Palpation of the foot does not cause pain   5/5 strength DF/PF  R dorsalis pedis +2 L dorsalis pedis +2   R posterior tibial +2 L posterior tibial +2     Assessment:     Problem List Items Addressed This Visit     Tendinitis - Primary    Relevant Orders    Initiate Outpatient Wound Care Protocol          Pre Debridement Measurements:  Are located in the [place]  Documentation Flow Sheet  Post Debridement Measurements:  Wound/Ulcer Descriptions are Pre Debridement except measurements:     Wound 01/31/22 Pretibial Right; Anterior #1 (Active)   Wound Image   01/31/22 1026   Dressing Status New dressing applied 02/14/22 0950   Wound Cleansed Cleansed with saline 02/14/22 0950   Dressing/Treatment Alginate;Dry dressing 02/14/22 0950   Offloading for Diabetic Foot Ulcers Offloading not required 02/14/22 0950   Wound Length (cm) 0.7 cm 02/14/22 0841   Wound Width (cm) 2.2 cm 02/14/22 0841   Wound Depth (cm) 0.1 cm 02/14/22 0841   Wound Surface Area (cm^2) 1.54 cm^2 02/14/22 0841   Change in Wound Size % (l*w) 34.19 02/14/22 0841   Wound Volume (cm^3) 0.154 cm^3 02/14/22 0841   Wound Healing % 34 02/14/22 0841   Post-Procedure Length (cm) 0.8 cm 02/14/22 0942   Post-Procedure Width (cm) 2.3 cm 02/14/22 0942   Post-Procedure Depth (cm) 0.2 cm 02/14/22 0942   Post-Procedure Surface Area (cm^2) 1.84 cm^2 02/14/22 0942   Post-Procedure Volume (cm^3) 0.368 cm^3 02/14/22 0942   Wound Assessment Dry;Fibrin 02/14/22 0841   Drainage Amount Scant 02/14/22 0841   Drainage Description Green;Brown 02/14/22 0841   Odor None 02/14/22 0841   Torrie-wound Assessment Fragile 02/14/22 0841   Number of days: 14          Procedure Note  Indications:  Based on my examination of this patient's wound(s)/ulcer(s) today, debridement is required to promote healing and evaluate the wound base. Performed by: Mauricio Garcia DPM    Consent obtained:  Yes    Time out taken:  Yes    Pain Control: Anesthetic  Anesthetic: 4% Lidocaine Liquid Topical     Debridement:Excisional Debridement    Using #15 blade scalpel the wound(s)/ulcer(s) was/were sharply debrided down through and including the removal of subcutaneous tissue. Devitalized Tissue Debrided:  slough and necrotic/eschar to stimulate bleeding to promote healing, post debridement good bleeding base and wound edges noted    Wound/Ulcer #: 1    Percent of Wound/Ulcer Debrided: 100%    Total Surface Area Debrided:  1.84 sq cm     Estimated Blood Loss:  None  Hemostasis Achieved:  by pressure    Procedural Pain:  4  / 10   Post Procedural Pain:  4 / 10     Response to treatment:  Well tolerated by patient. A culture was done. Plan:     Pt is not currently a smoker   - Discussed relationship of smoking and negative affects on wound healing   - Emphasized importance of tobacco avoidace/cessation   - Script for nicotine patch given to patient   - Information regarding support groups for smoking cessation given    In my professional opinion and based off the information that is available at this time this patient has appropriate indication for HBO Therapy: No    Treatment Note please see attached Discharge Instructions    Written patient dismissal instructions given to patient and signed by patient or POA. Discharge Instructions        Visit Discharge/Physician Orders     Discharge condition: Stable     Assessment of pain at discharge: minimal     Anesthetic used: 4$% lidocaine     Discharge to: Home     Left via:Private automobile     Accompanied by: n/a     ECF/HHA: [facility] for dressing supplies     Dressing Orders: right leg: cleanse wound with normal saline, apply bactroban ointment to wound, cover with alginate and dry dressing.  Change daily.     Treatment Orders: Continue antibiotic until finished.l Keep wound covered at all times.     [facility] followup visit ________1 week Dr parker_____________________  (Please note your next appointment above and if you are unable to keep, kindly give a 24 hour notice.  Thank you.)     Physician signature:__________________________        If you experience any of the following, please call the [address] [place] [address] during business hours:     * Increase in Pain  * Temperature over 101  * Increase in drainage from your wound  * Drainage with a foul odor  * Bleeding  * Increase in swelling  * Need for compression bandage changes due to slippage, breakthrough drainage.     If you need medical attention outside of the business hours of the [address] [place] [address] [employer] please contact your PCP or go to the nearest emergency room.                                Electronically signed by Charmaine Wren DPM on 2/14/2022 at 10:54 AM

## 2022-02-21 ENCOUNTER — HOSPITAL ENCOUNTER (OUTPATIENT)
Dept: WOUND CARE | Age: 22
Discharge: HOME | End: 2022-02-21
Payer: COMMERCIAL

## 2022-02-21 VITALS
RESPIRATION RATE: 16 BRPM | WEIGHT: 315 LBS | HEART RATE: 68 BPM | SYSTOLIC BLOOD PRESSURE: 122 MMHG | TEMPERATURE: 99.2 F | BODY MASS INDEX: 49.44 KG/M2 | DIASTOLIC BLOOD PRESSURE: 70 MMHG | HEIGHT: 67 IN

## 2022-02-21 DIAGNOSIS — M77.9 TENDINITIS: Primary | ICD-10-CM

## 2022-02-21 PROCEDURE — 11042 DBRDMT SUBQ TIS 1ST 20SQCM/<: CPT

## 2022-02-21 PROCEDURE — 6370000000 HC RX 637 (ALT 250 FOR IP): Performed by: PODIATRIST

## 2022-02-21 RX ORDER — LIDOCAINE HYDROCHLORIDE 20 MG/ML
JELLY TOPICAL ONCE
Status: CANCELLED | OUTPATIENT
Start: 2022-02-21 | End: 2022-02-21

## 2022-02-21 RX ORDER — LIDOCAINE HYDROCHLORIDE 40 MG/ML
SOLUTION TOPICAL ONCE
Status: COMPLETED | OUTPATIENT
Start: 2022-02-21 | End: 2022-02-21

## 2022-02-21 RX ORDER — CLOBETASOL PROPIONATE 0.5 MG/G
OINTMENT TOPICAL ONCE
Status: CANCELLED | OUTPATIENT
Start: 2022-02-21 | End: 2022-02-21

## 2022-02-21 RX ORDER — BACITRACIN ZINC AND POLYMYXIN B SULFATE 500; 1000 [USP'U]/G; [USP'U]/G
OINTMENT TOPICAL ONCE
Status: CANCELLED | OUTPATIENT
Start: 2022-02-21 | End: 2022-02-21

## 2022-02-21 RX ORDER — GENTAMICIN SULFATE 1 MG/G
OINTMENT TOPICAL ONCE
Status: CANCELLED | OUTPATIENT
Start: 2022-02-21 | End: 2022-02-21

## 2022-02-21 RX ORDER — LIDOCAINE 50 MG/G
OINTMENT TOPICAL ONCE
Status: CANCELLED | OUTPATIENT
Start: 2022-02-21 | End: 2022-02-21

## 2022-02-21 RX ORDER — BETAMETHASONE DIPROPIONATE 0.05 %
OINTMENT (GRAM) TOPICAL ONCE
Status: CANCELLED | OUTPATIENT
Start: 2022-02-21 | End: 2022-02-21

## 2022-02-21 RX ORDER — LIDOCAINE 40 MG/G
CREAM TOPICAL ONCE
Status: CANCELLED | OUTPATIENT
Start: 2022-02-21 | End: 2022-02-21

## 2022-02-21 RX ORDER — BACITRACIN, NEOMYCIN, POLYMYXIN B 400; 3.5; 5 [USP'U]/G; MG/G; [USP'U]/G
OINTMENT TOPICAL ONCE
Status: CANCELLED | OUTPATIENT
Start: 2022-02-21 | End: 2022-02-21

## 2022-02-21 RX ORDER — GINSENG 100 MG
CAPSULE ORAL ONCE
Status: CANCELLED | OUTPATIENT
Start: 2022-02-21 | End: 2022-02-21

## 2022-02-21 RX ORDER — LIDOCAINE HYDROCHLORIDE 40 MG/ML
SOLUTION TOPICAL ONCE
Status: CANCELLED | OUTPATIENT
Start: 2022-02-21 | End: 2022-02-21

## 2022-02-21 RX ADMIN — LIDOCAINE HYDROCHLORIDE: 40 SOLUTION TOPICAL at 10:18

## 2022-02-21 NOTE — PROGRESS NOTES
Asperger syndrome     Asthma     Obesity     Sleep apnea      Past Surgical History:   Procedure Laterality Date    ADENOIDECTOMY      TONSILLECTOMY      WISDOM TOOTH EXTRACTION       History reviewed. No pertinent family history. Social History     Tobacco Use    Smoking status: Never Smoker    Smokeless tobacco: Never Used   Vaping Use    Vaping Use: Every day    Substances: Nicotine, not ready to quit   Substance Use Topics    Alcohol use: Never    Drug use: Never     No Known Allergies  Current Outpatient Medications on File Prior to Encounter   Medication Sig Dispense Refill    mupirocin (BACTROBAN) 2 % ointment Apply topically daily      pimozide (ORAP) 2 MG tablet Take by mouth      sertraline (ZOLOFT) 25 MG tablet       pantoprazole (PROTONIX) 40 MG tablet Take 40 mg by mouth daily      [DISCONTINUED] ibuprofen (ADVIL;MOTRIN) 600 MG tablet Take 1 tablet by mouth every 8 hours as needed for Pain 30 tablet 0    albuterol sulfate HFA (PROVENTIL HFA) 108 (90 Base) MCG/ACT inhaler Inhale 2 puffs into the lungs every 4 hours as needed for Wheezing 1 Inhaler zero     No current facility-administered medications on file prior to encounter.        REVIEW OF SYSTEMS   ROS : All others Negative if blank [], Positive if [x]  General Vascular   [] Fevers [] Claudication   [] Chills [] Rest Pain   Skin Neurologic   [x] Tissue Loss [] Lower extremity neuropathy     Objective:    /70   Pulse 68   Temp 99.2 °F (37.3 °C) (Temporal)   Resp 16   Ht 5' 7\" (1.702 m)   Wt (!) 350 lb (158.8 kg)   BMI 54.82 kg/m²   Wt Readings from Last 3 Encounters:   02/21/22 (!) 350 lb (158.8 kg)   02/14/22 (!) 350 lb (158.8 kg)   02/07/22 (!) 350 lb (158.8 kg)       PHYSICAL EXAM   CONSTITUTIONAL:   Awake, alert, cooperative   PSYCHIATRIC :  Oriented to time, place and person      normal insight to disease process  EXTREMITIES:   R LE Open wounds are noted   Skin color is nearly normal without any significant findings   Edema is  noted   Sensation intact to light touch   Palpation of the foot does not cause pain   5/5 strength DF/PF  L LE Open wounds are not noted   Skin color is normal   Edema is  noted   Sensation intact to light touch   Palpation of the foot does not cause pain   5/5 strength DF/PF  R dorsalis pedis +2 L dorsalis pedis +2   R posterior tibial +2 L posterior tibial +2     Assessment:     Problem List Items Addressed This Visit     Tendinitis - Primary    Relevant Orders    Initiate Outpatient Wound Care Protocol          Pre Debridement Measurements:  Are located in the [place]  Documentation Flow Sheet  Post Debridement Measurements:  Wound/Ulcer Descriptions are Pre Debridement except measurements:     Wound 01/31/22 Pretibial Right; Anterior #1 (Active)   Wound Image   01/31/22 1026   Dressing Status New dressing applied 02/21/22 1042   Wound Cleansed Cleansed with saline 02/21/22 1042   Dressing/Treatment Pharmaceutical agent (see MAR); Alginate;Dry dressing 02/21/22 1042   Offloading for Diabetic Foot Ulcers Offloading not required 02/21/22 1042   Wound Length (cm) 0.6 cm 02/21/22 1012   Wound Width (cm) 1.5 cm 02/21/22 1012   Wound Depth (cm) 0.2 cm 02/21/22 1012   Wound Surface Area (cm^2) 0.9 cm^2 02/21/22 1012   Change in Wound Size % (l*w) 61.54 02/21/22 1012   Wound Volume (cm^3) 0.18 cm^3 02/21/22 1012   Wound Healing % 23 02/21/22 1012   Post-Procedure Length (cm) 0.7 cm 02/21/22 1032   Post-Procedure Width (cm) 1.6 cm 02/21/22 1032   Post-Procedure Depth (cm) 0.2 cm 02/21/22 1032   Post-Procedure Surface Area (cm^2) 1.12 cm^2 02/21/22 1032   Post-Procedure Volume (cm^3) 0.224 cm^3 02/21/22 1032   Wound Assessment Granulation tissue;Fibrin 02/21/22 1012   Drainage Amount Scant 02/21/22 1012   Drainage Description Serosanguinous 02/21/22 1012   Odor None 02/21/22 1012   Torrie-wound Assessment Blanchable erythema; Intact 02/21/22 1012   Number of days: 21          Procedure Note  Indications: Based on my examination of this patient's wound(s)/ulcer(s) today, debridement is required to promote healing and evaluate the wound base. Performed by: George Buitrago DPM    Consent obtained:  Yes    Time out taken:  Yes    Pain Control: Anesthetic  Anesthetic: 4% Lidocaine Liquid Topical     Debridement:Excisional Debridement    Using #15 blade scalpel the wound(s)/ulcer(s) was/were sharply debrided down through and including the removal of subcutaneous tissue. Devitalized Tissue Debrided:  slough and necrotic/eschar to stimulate bleeding to promote healing, post debridement good bleeding base and wound edges noted    Wound/Ulcer #: 1    Percent of Wound/Ulcer Debrided: 100%    Total Surface Area Debrided:  1.12 sq cm     Estimated Blood Loss:  None  Hemostasis Achieved:  by pressure    Procedural Pain:  4  / 10   Post Procedural Pain:  4 / 10     Response to treatment:  Well tolerated by patient. A culture was done. Plan:     Pt is not currently a smoker   - Discussed relationship of smoking and negative affects on wound healing   - Emphasized importance of tobacco avoidace/cessation   - Script for nicotine patch given to patient   - Information regarding support groups for smoking cessation given    In my professional opinion and based off the information that is available at this time this patient has appropriate indication for HBO Therapy: No    Treatment Note please see attached Discharge Instructions    Written patient dismissal instructions given to patient and signed by patient or POA.          Discharge Instructions        Visit Discharge/Physician Orders     Discharge condition: Stable     Assessment of pain at discharge: minimal     Anesthetic used: 4$% lidocaine     Discharge to: Home     Left via:Private automobile     Accompanied by: n/a     ECF/HHA: [facility] for dressing supplies     Dressing Orders: right leg: cleanse wound with normal saline, apply bactroban ointment to wound, cover with alginate and dry dressing. Change daily.     Treatment Orders: Keep wound covered at all times.     [address] [place] [address],[address] followup visit ________1 week Dr parker_____________________  (Please note your next appointment above and if you are unable to keep, kindly give a 24 hour notice.  Thank you.)     Physician signature:__________________________        If you experience any of the following, please call the [facility] during business hours:     * Increase in Pain  * Temperature over 101  * Increase in drainage from your wound  * Drainage with a foul odor  * Bleeding  * Increase in swelling  * Need for compression bandage changes due to slippage, breakthrough drainage.     If you need medical attention outside of the business hours of the [facility] please contact your PCP or go to the nearest emergency room.                                                     Electronically signed by Ramiro Mensah DPM on 2/21/2022 at 10:55 AM

## 2022-02-28 ENCOUNTER — HOSPITAL ENCOUNTER (OUTPATIENT)
Dept: WOUND CARE | Age: 22
Discharge: HOME | End: 2022-02-28
Payer: COMMERCIAL

## 2022-02-28 VITALS
HEART RATE: 98 BPM | SYSTOLIC BLOOD PRESSURE: 128 MMHG | TEMPERATURE: 99.6 F | WEIGHT: 315 LBS | RESPIRATION RATE: 16 BRPM | DIASTOLIC BLOOD PRESSURE: 64 MMHG | BODY MASS INDEX: 54.82 KG/M2

## 2022-02-28 DIAGNOSIS — M77.9 TENDINITIS: Primary | ICD-10-CM

## 2022-02-28 PROCEDURE — 11042 DBRDMT SUBQ TIS 1ST 20SQCM/<: CPT

## 2022-02-28 PROCEDURE — 6370000000 HC RX 637 (ALT 250 FOR IP): Performed by: PODIATRIST

## 2022-02-28 RX ORDER — LIDOCAINE HYDROCHLORIDE 20 MG/ML
JELLY TOPICAL ONCE
Status: CANCELLED | OUTPATIENT
Start: 2022-02-28 | End: 2022-02-28

## 2022-02-28 RX ORDER — LIDOCAINE 40 MG/G
CREAM TOPICAL ONCE
Status: CANCELLED | OUTPATIENT
Start: 2022-02-28 | End: 2022-02-28

## 2022-02-28 RX ORDER — BACITRACIN ZINC AND POLYMYXIN B SULFATE 500; 1000 [USP'U]/G; [USP'U]/G
OINTMENT TOPICAL ONCE
Status: CANCELLED | OUTPATIENT
Start: 2022-02-28 | End: 2022-02-28

## 2022-02-28 RX ORDER — CLOBETASOL PROPIONATE 0.5 MG/G
OINTMENT TOPICAL ONCE
Status: CANCELLED | OUTPATIENT
Start: 2022-02-28 | End: 2022-02-28

## 2022-02-28 RX ORDER — BACITRACIN, NEOMYCIN, POLYMYXIN B 400; 3.5; 5 [USP'U]/G; MG/G; [USP'U]/G
OINTMENT TOPICAL ONCE
Status: CANCELLED | OUTPATIENT
Start: 2022-02-28 | End: 2022-02-28

## 2022-02-28 RX ORDER — BETAMETHASONE DIPROPIONATE 0.05 %
OINTMENT (GRAM) TOPICAL ONCE
Status: CANCELLED | OUTPATIENT
Start: 2022-02-28 | End: 2022-02-28

## 2022-02-28 RX ORDER — LIDOCAINE 50 MG/G
OINTMENT TOPICAL ONCE
Status: CANCELLED | OUTPATIENT
Start: 2022-02-28 | End: 2022-02-28

## 2022-02-28 RX ORDER — LIDOCAINE HYDROCHLORIDE 40 MG/ML
SOLUTION TOPICAL ONCE
Status: COMPLETED | OUTPATIENT
Start: 2022-02-28 | End: 2022-02-28

## 2022-02-28 RX ORDER — GENTAMICIN SULFATE 1 MG/G
OINTMENT TOPICAL ONCE
Status: CANCELLED | OUTPATIENT
Start: 2022-02-28 | End: 2022-02-28

## 2022-02-28 RX ORDER — GINSENG 100 MG
CAPSULE ORAL ONCE
Status: CANCELLED | OUTPATIENT
Start: 2022-02-28 | End: 2022-02-28

## 2022-02-28 RX ORDER — LIDOCAINE HYDROCHLORIDE 40 MG/ML
SOLUTION TOPICAL ONCE
Status: CANCELLED | OUTPATIENT
Start: 2022-02-28 | End: 2022-02-28

## 2022-02-28 RX ADMIN — LIDOCAINE HYDROCHLORIDE 10 ML: 40 SOLUTION TOPICAL at 10:10

## 2022-02-28 NOTE — PROGRESS NOTES
[facility]  History and Physical/Consultation  Podiatry    Referring Physician : CATIE Bustillo NP  Daily Fritz. MEDICAL RECORD NUMBER:  [id number]  AGE: 24 y.o. GENDER: male  : 2000  EPISODE DATE:  2022  Subjective:     Chief Complaint   Patient presents with    Wound Check     Right leg         HISTORY of PRESENT ILLNESS HPI     Daily Fritz. is a 24 y.o. male who presents today for wound/ulcer evaluation. History of Wound Context:  The patient has had a wound of Right leg which was first noted approximately >1 month. This has been treated Sutures closure. On their initial visit to the wound healing center, 22 ,  the patient has noted that the wound has not been improving. The patient has not had similar previous wounds in the past.      Pt is not on abx at time of initial visit. 22 - Continue ointment and Alginate. Wound improving. FU 1 week  22 - Continue ointment and Alginate. Wound improving. FU 1 week  22 - Continue ointment and Alginate. Wound improving. FU 1 week  22 - Continue ointment and Alginate. Wound improving.   FU 1 week      Wound/Ulcer Pain Timing/Severity: moderate  Quality of pain: aching  Severity:  3 / 10   Modifying Factors: Pain worsens with walking  Associated Signs/Symptoms: edema    Ulcer Identification:  Ulcer Type: traumatic  Contributing Factors: edema    Diabetic/Pressure/Non Pressure Ulcers onl y:  Ulcer: Non-Pressure ulcer, fat layer exposed    If patient has diabetic lower extremity wounds  Church Classification of diabetic lower extremity wounds:    Grade Description   []  0 No open wound   [x]  1 Superficial ulcer involving the full skin thickness   []  2 Deep ulcer involves ligament, tendon, joint capsule, or fascia  No bone involvement or abscess presence   []  3 Deep Ulcer with abcess formation and/or osteomyelitis   []  4 Localized gangrene   []  5 Extensive gangrene of the foot     Wound: N/A PAST MEDICAL HISTORY      Diagnosis Date    Arthritis     Asperger syndrome     Asthma     Obesity     Sleep apnea      Past Surgical History:   Procedure Laterality Date    ADENOIDECTOMY      TONSILLECTOMY      WISDOM TOOTH EXTRACTION       History reviewed. No pertinent family history. Social History     Tobacco Use    Smoking status: Never Smoker    Smokeless tobacco: Never Used   Vaping Use    Vaping Use: Every day    Substances: Nicotine, not ready to quit   Substance Use Topics    Alcohol use: Never    Drug use: Never     No Known Allergies  Current Outpatient Medications on File Prior to Encounter   Medication Sig Dispense Refill    mupirocin (BACTROBAN) 2 % ointment Apply topically daily      pimozide (ORAP) 2 MG tablet Take by mouth      sertraline (ZOLOFT) 25 MG tablet       [DISCONTINUED] ibuprofen (ADVIL;MOTRIN) 600 MG tablet Take 1 tablet by mouth every 8 hours as needed for Pain 30 tablet 0    albuterol sulfate HFA (PROVENTIL HFA) 108 (90 Base) MCG/ACT inhaler Inhale 2 puffs into the lungs every 4 hours as needed for Wheezing 1 Inhaler zero    pantoprazole (PROTONIX) 40 MG tablet Take 40 mg by mouth daily       No current facility-administered medications on file prior to encounter.        REVIEW OF SYSTEMS   ROS : All others Negative if blank [], Positive if [x]  General Vascular   [] Fevers [] Claudication   [] Chills [] Rest Pain   Skin Neurologic   [x] Tissue Loss [] Lower extremity neuropathy     Objective:    /64   Pulse 98   Temp 99.6 °F (37.6 °C) (Tympanic)   Resp 16   Wt (!) 350 lb (158.8 kg)   BMI 54.82 kg/m²   Wt Readings from Last 3 Encounters:   02/28/22 (!) 350 lb (158.8 kg)   02/21/22 (!) 350 lb (158.8 kg)   02/14/22 (!) 350 lb (158.8 kg)       PHYSICAL EXAM   CONSTITUTIONAL:   Awake, alert, cooperative   PSYCHIATRIC :  Oriented to time, place and person      normal insight to disease process  EXTREMITIES:   R LE Open wounds are noted   Skin color is nearly normal without any significant findings   Edema is  noted   Sensation intact to light touch   Palpation of the foot does not cause pain   5/5 strength DF/PF  L LE Open wounds are not noted   Skin color is normal   Edema is  noted   Sensation intact to light touch   Palpation of the foot does not cause pain   5/5 strength DF/PF  R dorsalis pedis +2 L dorsalis pedis +2   R posterior tibial +2 L posterior tibial +2     Assessment:     Problem List Items Addressed This Visit     Tendinitis - Primary    Relevant Orders    Initiate Outpatient Wound Care Protocol          Pre Debridement Measurements:  Are located in the [place]  Documentation Flow Sheet  Post Debridement Measurements:  Wound/Ulcer Descriptions are Pre Debridement except measurements:     Wound 01/31/22 Pretibial Right; Anterior #1 (Active)   Wound Image   01/31/22 1026   Dressing Status New dressing applied 02/21/22 1042   Wound Cleansed Cleansed with saline 02/21/22 1042   Dressing/Treatment Pharmaceutical agent (see MAR); Alginate;Dry dressing 02/21/22 1042   Offloading for Diabetic Foot Ulcers Offloading not required 02/21/22 1042   Wound Length (cm) 0.5 cm 02/28/22 1011   Wound Width (cm) 2.2 cm 02/28/22 1011   Wound Depth (cm) 0.2 cm 02/28/22 1011   Wound Surface Area (cm^2) 1.1 cm^2 02/28/22 1011   Change in Wound Size % (l*w) 52.99 02/28/22 1011   Wound Volume (cm^3) 0.22 cm^3 02/28/22 1011   Wound Healing % 6 02/28/22 1011   Post-Procedure Length (cm) 0.6 cm 02/28/22 1036   Post-Procedure Width (cm) 2.3 cm 02/28/22 1036   Post-Procedure Depth (cm) 0.23 cm 02/28/22 1036   Post-Procedure Surface Area (cm^2) 1.38 cm^2 02/28/22 1036   Post-Procedure Volume (cm^3) 0.3174 cm^3 02/28/22 1036   Wound Assessment Dry;Fibrin 02/28/22 1011   Drainage Amount Small 02/28/22 1011   Drainage Description Serosanguinous 02/28/22 1011   Odor None 02/28/22 1011   Torrie-wound Assessment Intact 02/28/22 1011   Number of days: 28          Procedure Note  Indications:  Based on my examination of this patient's wound(s)/ulcer(s) today, debridement is required to promote healing and evaluate the wound base. Performed by: Hiwot Mason DPM    Consent obtained:  Yes    Time out taken:  Yes    Pain Control: Anesthetic  Anesthetic: 4% Lidocaine Liquid Topical     Debridement:Excisional Debridement    Using #15 blade scalpel the wound(s)/ulcer(s) was/were sharply debrided down through and including the removal of subcutaneous tissue. Devitalized Tissue Debrided:  slough and necrotic/eschar to stimulate bleeding to promote healing, post debridement good bleeding base and wound edges noted    Wound/Ulcer #: 1    Percent of Wound/Ulcer Debrided: 100%    Total Surface Area Debrided:  1.38 sq cm     Estimated Blood Loss:  None  Hemostasis Achieved:  by pressure    Procedural Pain:  4  / 10   Post Procedural Pain:  4 / 10     Response to treatment:  Well tolerated by patient. A culture was done. Plan:     Pt is not currently a smoker   - Discussed relationship of smoking and negative affects on wound healing   - Emphasized importance of tobacco avoidace/cessation   - Script for nicotine patch given to patient   - Information regarding support groups for smoking cessation given    In my professional opinion and based off the information that is available at this time this patient has appropriate indication for HBO Therapy: No    Treatment Note please see attached Discharge Instructions    Written patient dismissal instructions given to patient and signed by patient or POA.          Discharge Instructions       Visit Discharge/Physician Orders     Discharge condition: Stable     Assessment of pain at discharge: minimal     Anesthetic used: 4% lidocaine     Discharge to: Home     Left via:Private automobile     Accompanied by: n/a     ECF/[facility]: [facility] for dressing supplies     Dressing Orders: right leg: cleanse wound with normal saline, apply bactroban ointment to wound, cover with alginate and dry dressing. Change daily.     Treatment Orders: Keep wound covered at all times.     [address] [place] [address],[address] followup visit ________1 week Dr parker_____________________  (Please note your next appointment above and if you are unable to keep, kindly give a 24 hour notice.  Thank you.)     Physician signature:__________________________        If you experience any of the following, please call the [facility] during business hours:     * Increase in Pain  * Temperature over 101  * Increase in drainage from your wound  * Drainage with a foul odor  * Bleeding  * Increase in swelling  * Need for compression bandage changes due to slippage, breakthrough drainage.     If you need medical attention outside of the business hours of the "[facility] please contact your PCP or go to the nearest emergency room.                                                                          Electronically signed by Bandar Gr DPM on 2/28/2022 at 10:39 AM

## 2022-03-04 ENCOUNTER — INITIAL CONSULT (OUTPATIENT)
Dept: NEUROSURGERY | Age: 22
End: 2022-03-04
Payer: COMMERCIAL

## 2022-03-04 VITALS
DIASTOLIC BLOOD PRESSURE: 84 MMHG | OXYGEN SATURATION: 95 % | TEMPERATURE: 98.6 F | BODY MASS INDEX: 49.44 KG/M2 | RESPIRATION RATE: 24 BRPM | HEART RATE: 101 BPM | HEIGHT: 67 IN | WEIGHT: 315 LBS | SYSTOLIC BLOOD PRESSURE: 130 MMHG

## 2022-03-04 DIAGNOSIS — M54.2 CERVICALGIA: Primary | ICD-10-CM

## 2022-03-04 PROCEDURE — G8427 DOCREV CUR MEDS BY ELIG CLIN: HCPCS | Performed by: NEUROLOGICAL SURGERY

## 2022-03-04 PROCEDURE — G8484 FLU IMMUNIZE NO ADMIN: HCPCS | Performed by: NEUROLOGICAL SURGERY

## 2022-03-04 PROCEDURE — 1036F TOBACCO NON-USER: CPT | Performed by: NEUROLOGICAL SURGERY

## 2022-03-04 PROCEDURE — 99203 OFFICE O/P NEW LOW 30 MIN: CPT | Performed by: NEUROLOGICAL SURGERY

## 2022-03-04 PROCEDURE — G8417 CALC BMI ABV UP PARAM F/U: HCPCS | Performed by: NEUROLOGICAL SURGERY

## 2022-03-04 RX ORDER — FAMOTIDINE 40 MG/1
40 TABLET, FILM COATED ORAL DAILY
COMMUNITY
Start: 2020-03-18

## 2022-03-04 RX ORDER — ERGOCALCIFEROL 1.25 MG/1
50000 CAPSULE ORAL WEEKLY
COMMUNITY
Start: 2021-12-07

## 2022-03-04 RX ORDER — CETIRIZINE HYDROCHLORIDE 10 MG/1
10 TABLET ORAL DAILY
COMMUNITY
Start: 2022-02-01

## 2022-03-04 RX ORDER — GUANFACINE 3 MG/1
3 TABLET, EXTENDED RELEASE ORAL DAILY
COMMUNITY

## 2022-03-07 ENCOUNTER — HOSPITAL ENCOUNTER (OUTPATIENT)
Dept: WOUND CARE | Age: 22
Discharge: HOME | End: 2022-03-07
Payer: COMMERCIAL

## 2022-03-07 VITALS
TEMPERATURE: 97.8 F | DIASTOLIC BLOOD PRESSURE: 88 MMHG | RESPIRATION RATE: 22 BRPM | HEART RATE: 95 BPM | SYSTOLIC BLOOD PRESSURE: 140 MMHG

## 2022-03-07 DIAGNOSIS — M77.9 TENDINITIS: Primary | ICD-10-CM

## 2022-03-07 PROCEDURE — 11042 DBRDMT SUBQ TIS 1ST 20SQCM/<: CPT

## 2022-03-07 PROCEDURE — 6370000000 HC RX 637 (ALT 250 FOR IP): Performed by: PODIATRIST

## 2022-03-07 RX ORDER — LIDOCAINE 50 MG/G
OINTMENT TOPICAL ONCE
Status: CANCELLED | OUTPATIENT
Start: 2022-03-07 | End: 2022-03-07

## 2022-03-07 RX ORDER — LIDOCAINE HYDROCHLORIDE 20 MG/ML
JELLY TOPICAL ONCE
Status: CANCELLED | OUTPATIENT
Start: 2022-03-07 | End: 2022-03-07

## 2022-03-07 RX ORDER — LIDOCAINE HYDROCHLORIDE 40 MG/ML
SOLUTION TOPICAL ONCE
Status: COMPLETED | OUTPATIENT
Start: 2022-03-07 | End: 2022-03-07

## 2022-03-07 RX ORDER — GENTAMICIN SULFATE 1 MG/G
OINTMENT TOPICAL ONCE
Status: CANCELLED | OUTPATIENT
Start: 2022-03-07 | End: 2022-03-07

## 2022-03-07 RX ORDER — CLOBETASOL PROPIONATE 0.5 MG/G
OINTMENT TOPICAL ONCE
Status: CANCELLED | OUTPATIENT
Start: 2022-03-07 | End: 2022-03-07

## 2022-03-07 RX ORDER — BETAMETHASONE DIPROPIONATE 0.05 %
OINTMENT (GRAM) TOPICAL ONCE
Status: CANCELLED | OUTPATIENT
Start: 2022-03-07 | End: 2022-03-07

## 2022-03-07 RX ORDER — BACITRACIN, NEOMYCIN, POLYMYXIN B 400; 3.5; 5 [USP'U]/G; MG/G; [USP'U]/G
OINTMENT TOPICAL ONCE
Status: CANCELLED | OUTPATIENT
Start: 2022-03-07 | End: 2022-03-07

## 2022-03-07 RX ORDER — LIDOCAINE HYDROCHLORIDE 40 MG/ML
SOLUTION TOPICAL ONCE
Status: CANCELLED | OUTPATIENT
Start: 2022-03-07 | End: 2022-03-07

## 2022-03-07 RX ORDER — BACITRACIN ZINC AND POLYMYXIN B SULFATE 500; 1000 [USP'U]/G; [USP'U]/G
OINTMENT TOPICAL ONCE
Status: CANCELLED | OUTPATIENT
Start: 2022-03-07 | End: 2022-03-07

## 2022-03-07 RX ORDER — GINSENG 100 MG
CAPSULE ORAL ONCE
Status: CANCELLED | OUTPATIENT
Start: 2022-03-07 | End: 2022-03-07

## 2022-03-07 RX ORDER — LIDOCAINE 40 MG/G
CREAM TOPICAL ONCE
Status: CANCELLED | OUTPATIENT
Start: 2022-03-07 | End: 2022-03-07

## 2022-03-07 RX ADMIN — LIDOCAINE HYDROCHLORIDE: 40 SOLUTION TOPICAL at 10:24

## 2022-03-07 ASSESSMENT — PAIN SCALES - GENERAL: PAINLEVEL_OUTOF10: 0

## 2022-03-07 NOTE — PROGRESS NOTES
Wound Healing Center  History and Physical/Consultation  Podiatry    Referring Physician : CATIE Sanchez NP  Khalif Adhikari. MEDICAL RECORD NUMBER:  [id number]  AGE: 24 y.o. GENDER: male  : 2000  EPISODE DATE:  3/7/2022  Subjective:     Chief Complaint   Patient presents with    Wound Check     right leg         HISTORY of PRESENT ILLNESS HPI     Khalif Krishnamurthy is a 24 y.o. male who presents today for wound/ulcer evaluation. History of Wound Context:  The patient has had a wound of Right leg which was first noted approximately >1 month. This has been treated Sutures closure. On their initial visit to the wound healing center, 22 ,  the patient has noted that the wound has not been improving. The patient has not had similar previous wounds in the past.      Pt is not on abx at time of initial visit. 22 - Continue ointment and Alginate. Wound improving. FU 1 week  22 - Continue ointment and Alginate. Wound improving. FU 1 week  22 - Continue ointment and Alginate. Wound improving. FU 1 week  22 - Continue ointment and Alginate. Wound improving. FU 1 week  3/7/22 - Continue ointment and Alginate. Wound improving.   FU 1 week      Wound/Ulcer Pain Timing/Severity: moderate  Quality of pain: aching  Severity:  3 / 10   Modifying Factors: Pain worsens with walking  Associated Signs/Symptoms: edema    Ulcer Identification:  Ulcer Type: traumatic  Contributing Factors: edema    Diabetic/Pressure/Non Pressure Ulcers onl y:  Ulcer: Non-Pressure ulcer, fat layer exposed    If patient has diabetic lower extremity wounds  Church Classification of diabetic lower extremity wounds:    Grade Description   []  0 No open wound   [x]  1 Superficial ulcer involving the full skin thickness   []  2 Deep ulcer involves ligament, tendon, joint capsule, or fascia  No bone involvement or abscess presence   []  3 Deep Ulcer with abcess formation and/or osteomyelitis   []  4 Localized gangrene   []  5 Extensive gangrene of the foot     Wound: N/A        PAST MEDICAL HISTORY      Diagnosis Date    Arthritis     Asperger syndrome     Asthma     Obesity     Sleep apnea      Past Surgical History:   Procedure Laterality Date    ADENOIDECTOMY      TONSILLECTOMY      WISDOM TOOTH EXTRACTION       History reviewed. No pertinent family history. Social History     Tobacco Use    Smoking status: Never Smoker    Smokeless tobacco: Never Used   Vaping Use    Vaping Use: Every day    Substances: Nicotine, not ready to quit   Substance Use Topics    Alcohol use: Never    Drug use: Never     No Known Allergies  Current Outpatient Medications on File Prior to Encounter   Medication Sig Dispense Refill    albuterol sulfate HFA (PROVENTIL HFA) 108 (90 Base) MCG/ACT inhaler Inhale 2 puffs into the lungs every 4 hours as needed for Wheezing 1 Inhaler zero    cetirizine (ZYRTEC) 10 MG tablet TAKE 1 TABLET BY MOUTH EVERY DAY      vitamin D (ERGOCALCIFEROL) 1.25 MG (53120 UT) CAPS capsule       famotidine (PEPCID) 40 MG tablet       guanFACINE HCl ER (INTUNIV) 3 MG TB24 tablet       mupirocin (BACTROBAN) 2 % ointment Apply topically daily      [DISCONTINUED] ibuprofen (ADVIL;MOTRIN) 600 MG tablet Take 1 tablet by mouth every 8 hours as needed for Pain 30 tablet 0    pimozide (ORAP) 2 MG tablet Take by mouth      sertraline (ZOLOFT) 25 MG tablet        No current facility-administered medications on file prior to encounter.        REVIEW OF SYSTEMS   ROS : All others Negative if blank [], Positive if [x]  General Vascular   [] Fevers [] Claudication   [] Chills [] Rest Pain   Skin Neurologic   [x] Tissue Loss [] Lower extremity neuropathy     Objective:    BP (!) 140/88   Pulse 95   Temp 97.8 °F (36.6 °C) (Temporal)   Resp 22   Wt Readings from Last 3 Encounters:   03/04/22 (!) 350 lb (158.8 kg)   02/28/22 (!) 350 lb (158.8 kg)   02/21/22 (!) 350 lb (158.8 kg)       PHYSICAL EXAM CONSTITUTIONAL:   Awake, alert, cooperative   PSYCHIATRIC :  Oriented to time, place and person      normal insight to disease process  EXTREMITIES:   R LE Open wounds are noted   Skin color is nearly normal without any significant findings   Edema is  noted   Sensation intact to light touch   Palpation of the foot does not cause pain   5/5 strength DF/PF  L LE Open wounds are not noted   Skin color is normal   Edema is  noted   Sensation intact to light touch   Palpation of the foot does not cause pain   5/5 strength DF/PF  R dorsalis pedis +2 L dorsalis pedis +2   R posterior tibial +2 L posterior tibial +2     Assessment:     Problem List Items Addressed This Visit     Tendinitis - Primary    Relevant Orders    Initiate Outpatient Wound Care Protocol          Pre Debridement Measurements:  Are located in the [place]  Documentation Flow Sheet  Post Debridement Measurements:  Wound/Ulcer Descriptions are Pre Debridement except measurements:     Wound 01/31/22 Pretibial Right; Anterior #1 (Active)   Wound Image   03/07/22 1021   Dressing Status New dressing applied 02/21/22 1042   Wound Cleansed Cleansed with saline 02/21/22 1042   Dressing/Treatment Pharmaceutical agent (see MAR); Alginate;Dry dressing 02/21/22 1042   Offloading for Diabetic Foot Ulcers Offloading not required 02/21/22 1042   Wound Length (cm) 0.5 cm 03/07/22 1021   Wound Width (cm) 2.2 cm 03/07/22 1021   Wound Depth (cm) 0.1 cm 03/07/22 1021   Wound Surface Area (cm^2) 1.1 cm^2 03/07/22 1021   Change in Wound Size % (l*w) 52.99 03/07/22 1021   Wound Volume (cm^3) 0.11 cm^3 03/07/22 1021   Wound Healing % 53 03/07/22 1021   Post-Procedure Length (cm) 0.6 cm 03/07/22 1033   Post-Procedure Width (cm) 2.3 cm 03/07/22 1033   Post-Procedure Depth (cm) 0.2 cm 03/07/22 1033   Post-Procedure Surface Area (cm^2) 1.38 cm^2 03/07/22 1033   Post-Procedure Volume (cm^3) 0.276 cm^3 03/07/22 1033   Wound Assessment Dry;Fibrin 03/07/22 1021   Drainage Amount None 03/07/22 1021   Drainage Description Serosanguinous 02/28/22 1011   Odor None 03/07/22 1021   Torrie-wound Assessment Intact 03/07/22 1021   Number of days: 35          Procedure Note  Indications:  Based on my examination of this patient's wound(s)/ulcer(s) today, debridement is required to promote healing and evaluate the wound base. Performed by: Hernan Cleaning DPM    Consent obtained:  Yes    Time out taken:  Yes    Pain Control: Anesthetic  Anesthetic: 4% Lidocaine Liquid Topical     Debridement:Excisional Debridement    Using #15 blade scalpel the wound(s)/ulcer(s) was/were sharply debrided down through and including the removal of subcutaneous tissue. Devitalized Tissue Debrided:  slough and necrotic/eschar to stimulate bleeding to promote healing, post debridement good bleeding base and wound edges noted    Wound/Ulcer #: 1    Percent of Wound/Ulcer Debrided: 100%    Total Surface Area Debrided:  1.38 sq cm     Estimated Blood Loss:  None  Hemostasis Achieved:  by pressure    Procedural Pain:  4  / 10   Post Procedural Pain:  4 / 10     Response to treatment:  Well tolerated by patient. A culture was done. Plan:     Pt is not currently a smoker   - Discussed relationship of smoking and negative affects on wound healing   - Emphasized importance of tobacco avoidace/cessation   - Script for nicotine patch given to patient   - Information regarding support groups for smoking cessation given    In my professional opinion and based off the information that is available at this time this patient has appropriate indication for HBO Therapy: No    Treatment Note please see attached Discharge Instructions    Written patient dismissal instructions given to patient and signed by patient or POA.          Discharge Instructions       Visit Discharge/Physician Orders     Discharge condition: Stable     Assessment of pain at discharge: minimal     Anesthetic used: 4% lidocaine     Discharge to:

## 2022-03-14 ENCOUNTER — HOSPITAL ENCOUNTER (OUTPATIENT)
Dept: WOUND CARE | Age: 22
Discharge: HOME | End: 2022-03-14
Payer: COMMERCIAL

## 2022-03-14 VITALS
RESPIRATION RATE: 22 BRPM | SYSTOLIC BLOOD PRESSURE: 148 MMHG | HEART RATE: 106 BPM | HEIGHT: 67 IN | TEMPERATURE: 99.5 F | BODY MASS INDEX: 49.44 KG/M2 | WEIGHT: 315 LBS | DIASTOLIC BLOOD PRESSURE: 88 MMHG

## 2022-03-14 DIAGNOSIS — M77.9 TENDINITIS: Primary | ICD-10-CM

## 2022-03-14 PROCEDURE — 6370000000 HC RX 637 (ALT 250 FOR IP): Performed by: PODIATRIST

## 2022-03-14 PROCEDURE — 11042 DBRDMT SUBQ TIS 1ST 20SQCM/<: CPT

## 2022-03-14 RX ORDER — LIDOCAINE 40 MG/G
CREAM TOPICAL ONCE
Status: CANCELLED | OUTPATIENT
Start: 2022-03-14 | End: 2022-03-14

## 2022-03-14 RX ORDER — GENTAMICIN SULFATE 1 MG/G
OINTMENT TOPICAL ONCE
Status: CANCELLED | OUTPATIENT
Start: 2022-03-14 | End: 2022-03-14

## 2022-03-14 RX ORDER — LIDOCAINE HYDROCHLORIDE 20 MG/ML
JELLY TOPICAL ONCE
Status: CANCELLED | OUTPATIENT
Start: 2022-03-14 | End: 2022-03-14

## 2022-03-14 RX ORDER — GINSENG 100 MG
CAPSULE ORAL ONCE
Status: CANCELLED | OUTPATIENT
Start: 2022-03-14 | End: 2022-03-14

## 2022-03-14 RX ORDER — BETAMETHASONE DIPROPIONATE 0.05 %
OINTMENT (GRAM) TOPICAL ONCE
Status: CANCELLED | OUTPATIENT
Start: 2022-03-14 | End: 2022-03-14

## 2022-03-14 RX ORDER — BACITRACIN, NEOMYCIN, POLYMYXIN B 400; 3.5; 5 [USP'U]/G; MG/G; [USP'U]/G
OINTMENT TOPICAL ONCE
Status: CANCELLED | OUTPATIENT
Start: 2022-03-14 | End: 2022-03-14

## 2022-03-14 RX ORDER — LIDOCAINE HYDROCHLORIDE 40 MG/ML
SOLUTION TOPICAL ONCE
Status: CANCELLED | OUTPATIENT
Start: 2022-03-14 | End: 2022-03-14

## 2022-03-14 RX ORDER — BACITRACIN ZINC AND POLYMYXIN B SULFATE 500; 1000 [USP'U]/G; [USP'U]/G
OINTMENT TOPICAL ONCE
Status: CANCELLED | OUTPATIENT
Start: 2022-03-14 | End: 2022-03-14

## 2022-03-14 RX ORDER — LIDOCAINE HYDROCHLORIDE 40 MG/ML
SOLUTION TOPICAL ONCE
Status: COMPLETED | OUTPATIENT
Start: 2022-03-14 | End: 2022-03-14

## 2022-03-14 RX ORDER — CLOBETASOL PROPIONATE 0.5 MG/G
OINTMENT TOPICAL ONCE
Status: CANCELLED | OUTPATIENT
Start: 2022-03-14 | End: 2022-03-14

## 2022-03-14 RX ORDER — LIDOCAINE 50 MG/G
OINTMENT TOPICAL ONCE
Status: CANCELLED | OUTPATIENT
Start: 2022-03-14 | End: 2022-03-14

## 2022-03-14 RX ADMIN — LIDOCAINE HYDROCHLORIDE: 40 SOLUTION TOPICAL at 09:59

## 2022-03-14 NOTE — PROGRESS NOTES
Wound Healing Center  History and Physical/Consultation  Podiatry    Referring Physician : CATIE Pham NP  Eliseo Jang. MEDICAL RECORD NUMBER:  [id number]  AGE: 24 y.o. GENDER: male  : 2000  EPISODE DATE:  3/14/2022  Subjective:     Chief Complaint   Patient presents with    Wound Check     right pretib ulcer         HISTORY of PRESENT ILLNESS HPI     Eliseo Noble is a 24 y.o. male who presents today for wound/ulcer evaluation. History of Wound Context:  The patient has had a wound of Right leg which was first noted approximately >1 month. This has been treated Sutures closure. On their initial visit to the wound healing center, 22 ,  the patient has noted that the wound has not been improving. The patient has not had similar previous wounds in the past.      Pt is not on abx at time of initial visit. 22 - Continue ointment and Alginate. Wound improving. FU 1 week  22 - Continue ointment and Alginate. Wound improving. FU 1 week  22 - Continue ointment and Alginate. Wound improving. FU 1 week  22 - Continue ointment and Alginate. Wound improving. FU 1 week  3/7/22 - Continue ointment and Alginate. Wound improving. FU 1 week  3/14/22 - Continue ointment and Alginate. Wound improving.   FU 1 week      Wound/Ulcer Pain Timing/Severity: moderate  Quality of pain: aching  Severity:  3 / 10   Modifying Factors: Pain worsens with walking  Associated Signs/Symptoms: edema    Ulcer Identification:  Ulcer Type: traumatic  Contributing Factors: edema    Diabetic/Pressure/Non Pressure Ulcers onl y:  Ulcer: Non-Pressure ulcer, fat layer exposed    If patient has diabetic lower extremity wounds  Church Classification of diabetic lower extremity wounds:    Grade Description   []  0 No open wound   [x]  1 Superficial ulcer involving the full skin thickness   []  2 Deep ulcer involves ligament, tendon, joint capsule, or fascia  No bone involvement or abscess presence   []  3 Deep Ulcer with abcess formation and/or osteomyelitis   []  4 Localized gangrene   []  5 Extensive gangrene of the foot     Wound: N/A        PAST MEDICAL HISTORY      Diagnosis Date    Arthritis     Asperger syndrome     Asthma     Obesity     Sleep apnea      Past Surgical History:   Procedure Laterality Date    ADENOIDECTOMY      TONSILLECTOMY      WISDOM TOOTH EXTRACTION       History reviewed. No pertinent family history. Social History     Tobacco Use    Smoking status: Never Smoker    Smokeless tobacco: Never Used   Vaping Use    Vaping Use: Every day    Substances: Nicotine, not ready to quit   Substance Use Topics    Alcohol use: Never    Drug use: Never     No Known Allergies  Current Outpatient Medications on File Prior to Encounter   Medication Sig Dispense Refill    cetirizine (ZYRTEC) 10 MG tablet TAKE 1 TABLET BY MOUTH EVERY DAY      vitamin D (ERGOCALCIFEROL) 1.25 MG (12315 UT) CAPS capsule       famotidine (PEPCID) 40 MG tablet       guanFACINE HCl ER (INTUNIV) 3 MG TB24 tablet       mupirocin (BACTROBAN) 2 % ointment Apply topically daily      pimozide (ORAP) 2 MG tablet Take by mouth      sertraline (ZOLOFT) 25 MG tablet       [DISCONTINUED] ibuprofen (ADVIL;MOTRIN) 600 MG tablet Take 1 tablet by mouth every 8 hours as needed for Pain 30 tablet 0    albuterol sulfate HFA (PROVENTIL HFA) 108 (90 Base) MCG/ACT inhaler Inhale 2 puffs into the lungs every 4 hours as needed for Wheezing 1 Inhaler zero     No current facility-administered medications on file prior to encounter.        REVIEW OF SYSTEMS   ROS : All others Negative if blank [], Positive if [x]  General Vascular   [] Fevers [] Claudication   [] Chills [] Rest Pain   Skin Neurologic   [x] Tissue Loss [] Lower extremity neuropathy     Objective:    BP (!) 148/88   Pulse 106   Temp 99.5 °F (37.5 °C)   Resp 22   Ht 5' 7\" (1.702 m)   Wt (!) 360 lb (163.3 kg)   BMI 56.38 kg/m²   Wt Readings from Last 03/14/22 1011   Post-Procedure Volume (cm^3) 0.05 cm^3 03/14/22 1011   Wound Assessment Granulation tissue 03/14/22 0958   Drainage Amount None 03/14/22 0958   Drainage Description Serosanguinous 02/28/22 1011   Odor None 03/14/22 0958   Torrie-wound Assessment Dry/flaky; Intact 03/14/22 0958   Number of days: 41          Procedure Note  Indications:  Based on my examination of this patient's wound(s)/ulcer(s) today, debridement is required to promote healing and evaluate the wound base. Performed by: Marisela Cramer DPM    Consent obtained:  Yes    Time out taken:  Yes    Pain Control: Anesthetic  Anesthetic: 4% Lidocaine Liquid Topical     Debridement:Excisional Debridement    Using #15 blade scalpel the wound(s)/ulcer(s) was/were sharply debrided down through and including the removal of subcutaneous tissue. Devitalized Tissue Debrided:  slough and necrotic/eschar to stimulate bleeding to promote healing, post debridement good bleeding base and wound edges noted    Wound/Ulcer #: 1    Percent of Wound/Ulcer Debrided: 100%    Total Surface Area Debrided:  0.25 sq cm     Estimated Blood Loss:  None  Hemostasis Achieved:  by pressure    Procedural Pain:  4  / 10   Post Procedural Pain:  4 / 10     Response to treatment:  Well tolerated by patient. A culture was done. Plan:     Pt is not currently a smoker   - Discussed relationship of smoking and negative affects on wound healing   - Emphasized importance of tobacco avoidace/cessation   - Script for nicotine patch given to patient   - Information regarding support groups for smoking cessation given    In my professional opinion and based off the information that is available at this time this patient has appropriate indication for HBO Therapy: No    Treatment Note please see attached Discharge Instructions    Written patient dismissal instructions given to patient and signed by patient or POA.          Discharge Instructions       Visit
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
## 2022-03-21 ENCOUNTER — HOSPITAL ENCOUNTER (OUTPATIENT)
Dept: WOUND CARE | Age: 22
Discharge: HOME | End: 2022-03-21
Payer: COMMERCIAL

## 2022-03-21 VITALS
RESPIRATION RATE: 20 BRPM | TEMPERATURE: 99.2 F | DIASTOLIC BLOOD PRESSURE: 70 MMHG | HEART RATE: 86 BPM | SYSTOLIC BLOOD PRESSURE: 140 MMHG

## 2022-03-21 DIAGNOSIS — M77.9 TENDINITIS: Primary | ICD-10-CM

## 2022-03-21 PROCEDURE — 99211 OFF/OP EST MAY X REQ PHY/QHP: CPT

## 2022-03-21 PROCEDURE — 6370000000 HC RX 637 (ALT 250 FOR IP): Performed by: PODIATRIST

## 2022-03-21 RX ORDER — LIDOCAINE HYDROCHLORIDE 40 MG/ML
SOLUTION TOPICAL ONCE
Status: CANCELLED | OUTPATIENT
Start: 2022-03-21 | End: 2022-03-21

## 2022-03-21 RX ORDER — CLOBETASOL PROPIONATE 0.5 MG/G
OINTMENT TOPICAL ONCE
Status: CANCELLED | OUTPATIENT
Start: 2022-03-21 | End: 2022-03-21

## 2022-03-21 RX ORDER — LIDOCAINE HYDROCHLORIDE 20 MG/ML
JELLY TOPICAL ONCE
Status: CANCELLED | OUTPATIENT
Start: 2022-03-21 | End: 2022-03-21

## 2022-03-21 RX ORDER — GENTAMICIN SULFATE 1 MG/G
OINTMENT TOPICAL ONCE
Status: CANCELLED | OUTPATIENT
Start: 2022-03-21 | End: 2022-03-21

## 2022-03-21 RX ORDER — LIDOCAINE HYDROCHLORIDE 40 MG/ML
SOLUTION TOPICAL ONCE
Status: COMPLETED | OUTPATIENT
Start: 2022-03-21 | End: 2022-03-21

## 2022-03-21 RX ORDER — LIDOCAINE 40 MG/G
CREAM TOPICAL ONCE
Status: CANCELLED | OUTPATIENT
Start: 2022-03-21 | End: 2022-03-21

## 2022-03-21 RX ORDER — BACITRACIN, NEOMYCIN, POLYMYXIN B 400; 3.5; 5 [USP'U]/G; MG/G; [USP'U]/G
OINTMENT TOPICAL ONCE
Status: CANCELLED | OUTPATIENT
Start: 2022-03-21 | End: 2022-03-21

## 2022-03-21 RX ORDER — GINSENG 100 MG
CAPSULE ORAL ONCE
Status: CANCELLED | OUTPATIENT
Start: 2022-03-21 | End: 2022-03-21

## 2022-03-21 RX ORDER — BACITRACIN ZINC AND POLYMYXIN B SULFATE 500; 1000 [USP'U]/G; [USP'U]/G
OINTMENT TOPICAL ONCE
Status: CANCELLED | OUTPATIENT
Start: 2022-03-21 | End: 2022-03-21

## 2022-03-21 RX ORDER — LIDOCAINE 50 MG/G
OINTMENT TOPICAL ONCE
Status: CANCELLED | OUTPATIENT
Start: 2022-03-21 | End: 2022-03-21

## 2022-03-21 RX ORDER — BETAMETHASONE DIPROPIONATE 0.05 %
OINTMENT (GRAM) TOPICAL ONCE
Status: CANCELLED | OUTPATIENT
Start: 2022-03-21 | End: 2022-03-21

## 2022-03-21 RX ADMIN — LIDOCAINE HYDROCHLORIDE 4 ML: 40 SOLUTION TOPICAL at 10:17

## 2022-03-21 NOTE — PROGRESS NOTES
[facility]aling Center  History and Physical/Consultation  Podiatry    Referring Physician : CATIE Parikh NP  Jem Arevalo. MEDICAL RECORD NUMBER:  [id number]  AGE: 24 y.o. GENDER: male  : 2000  EPISODE DATE:  3/21/2022  Subjective:     Chief Complaint   Patient presents with    Wound Check     wound right leg         HISTORY of PRESENT ILLNESS HPI     Jem Arevalo. is a 24 y.o. male who presents today for wound/ulcer evaluation. History of Wound Context:  The patient has had a wound of Right leg which was first noted approximately >1 month. This has been treated Sutures closure. On their initial visit to the wound healing center, 22 ,  the patient has noted that the wound has not been improving. The patient has not had similar previous wounds in the past.      Pt is not on abx at time of initial visit. 22 - Continue ointment and Alginate. Wound improving. FU 1 week  22 - Continue ointment and Alginate. Wound improving. FU 1 week  22 - Continue ointment and Alginate. Wound improving. FU 1 week  22 - Continue ointment and Alginate. Wound improving. FU 1 week  3/7/22 - Continue ointment and Alginate. Wound improving. FU 1 week  3/14/22 - Continue ointment and Alginate. Wound improving. FU 1 week  3/21/22 - Wound resolved.       Wound/Ulcer Pain Timing/Severity: moderate  Quality of pain: aching  Severity:  3 / 10   Modifying Factors: Pain worsens with walking  Associated Signs/Symptoms: edema    Ulcer Identification:  Ulcer Type: traumatic  Contributing Factors: edema    Diabetic/Pressure/Non Pressure Ulcers onl y:  Ulcer: Non-Pressure ulcer, fat layer exposed    If patient has diabetic lower extremity wounds  Church Classification of diabetic lower extremity wounds:    Grade Description   []  0 No open wound   [x]  1 Superficial ulcer involving the full skin thickness   []  2 Deep ulcer involves ligament, tendon, joint capsule, or fascia  No bone involvement or abscess presence   []  3 Deep Ulcer with abcess formation and/or osteomyelitis   []  4 Localized gangrene   []  5 Extensive gangrene of the foot     Wound: N/A        PAST MEDICAL HISTORY      Diagnosis Date    Arthritis     Asperger syndrome     Asthma     Obesity     Sleep apnea      Past Surgical History:   Procedure Laterality Date    ADENOIDECTOMY      TONSILLECTOMY      WISDOM TOOTH EXTRACTION       History reviewed. No pertinent family history. Social History     Tobacco Use    Smoking status: Never Smoker    Smokeless tobacco: Never Used   Vaping Use    Vaping Use: Every day    Substances: Nicotine, not ready to quit   Substance Use Topics    Alcohol use: Never    Drug use: Never     No Known Allergies  Current Outpatient Medications on File Prior to Encounter   Medication Sig Dispense Refill    cetirizine (ZYRTEC) 10 MG tablet TAKE 1 TABLET BY MOUTH EVERY DAY      vitamin D (ERGOCALCIFEROL) 1.25 MG (97599 UT) CAPS capsule       famotidine (PEPCID) 40 MG tablet       guanFACINE HCl ER (INTUNIV) 3 MG TB24 tablet       mupirocin (BACTROBAN) 2 % ointment Apply topically daily      [DISCONTINUED] ibuprofen (ADVIL;MOTRIN) 600 MG tablet Take 1 tablet by mouth every 8 hours as needed for Pain 30 tablet 0    pimozide (ORAP) 2 MG tablet Take by mouth      sertraline (ZOLOFT) 25 MG tablet       albuterol sulfate HFA (PROVENTIL HFA) 108 (90 Base) MCG/ACT inhaler Inhale 2 puffs into the lungs every 4 hours as needed for Wheezing 1 Inhaler zero     No current facility-administered medications on file prior to encounter.        REVIEW OF SYSTEMS   ROS : All others Negative if blank [], Positive if [x]  General Vascular   [] Fevers [] Claudication   [] Chills [] Rest Pain   Skin Neurologic   [x] Tissue Loss [] Lower extremity neuropathy     Objective:    BP (!) 140/70   Pulse 86   Temp 99.2 °F (37.3 °C) (Temporal)   Resp 20   Wt Readings from Last 3 Encounters:   03/14/22 (!) 360 lb (163.3 kg)   03/04/22 (!) 350 lb (158.8 kg)   02/28/22 (!) 350 lb (158.8 kg)       PHYSICAL EXAM   CONSTITUTIONAL:   Awake, alert, cooperative   PSYCHIATRIC :  Oriented to time, place and person      normal insight to disease process  EXTREMITIES:   R LE Open wounds are noted   Skin color is nearly normal without any significant findings   Edema is  noted   Sensation intact to light touch   Palpation of the foot does not cause pain   5/5 strength DF/PF  L LE Open wounds are not noted   Skin color is normal   Edema is  noted   Sensation intact to light touch   Palpation of the foot does not cause pain   5/5 strength DF/PF  R dorsalis pedis +2 L dorsalis pedis +2   R posterior tibial +2 L posterior tibial +2     Assessment:     Problem List Items Addressed This Visit     Tendinitis - Primary    Relevant Orders    Initiate Outpatient Wound Care Protocol          Pre Debridement Measurements:  Are located in the [place]  Documentation Flow Sheet  Post Debridement Measurements:  Wound/Ulcer Descriptions are Pre Debridement except measurements:     Wound 01/31/22 Pretibial Right; Anterior #1 (Active)   Wound Image   03/07/22 1021   Dressing Status New dressing applied 03/14/22 1014   Wound Cleansed Cleansed with saline 03/14/22 1014   Dressing/Treatment Alginate;Dry dressing 03/14/22 1014   Offloading for Diabetic Foot Ulcers Offloading not required 03/14/22 1014   Wound Length (cm) 0.2 cm 03/21/22 1013   Wound Width (cm) 0.2 cm 03/21/22 1013   Wound Depth (cm) 0.1 cm 03/21/22 1013   Wound Surface Area (cm^2) 0.04 cm^2 03/21/22 1013   Change in Wound Size % (l*w) 98.29 03/21/22 1013   Wound Volume (cm^3) 0.004 cm^3 03/21/22 1013   Wound Healing % 98 03/21/22 1013   Post-Procedure Length (cm) 0.5 cm 03/14/22 1011   Post-Procedure Width (cm) 0.5 cm 03/14/22 1011   Post-Procedure Depth (cm) 0.2 cm 03/14/22 1011   Post-Procedure Surface Area (cm^2) 0.25 cm^2 03/14/22 1011   Post-Procedure Volume (cm^3) 0.05 cm^3 03/14/22 1011   Wound Assessment Granulation tissue 03/14/22 0958   Drainage Amount None 03/21/22 1013   Drainage Description Serosanguinous 02/28/22 1011   Odor None 03/21/22 1013   Torrie-wound Assessment Intact 03/21/22 1013   Number of days: 48          Procedure Note  Indications:  Based on my examination of this patient's wound(s)/ulcer(s) today, debridement is required to promote healing and evaluate the wound base. Performed by: Gabriel Hyatt DPM    Consent obtained:  Yes    Time out taken:  Yes    Pain Control: Anesthetic  Anesthetic: 4% Lidocaine Liquid Topical     Debridement:Excisional Debridement    Using #15 blade scalpel the wound(s)/ulcer(s) was/were sharply debrided down through and including the removal of subcutaneous tissue. Devitalized Tissue Debrided:  slough and necrotic/eschar to stimulate bleeding to promote healing, post debridement good bleeding base and wound edges noted    Wound/Ulcer #: 1    Percent of Wound/Ulcer Debrided: 100%    Total Surface Area Debrided:  0.25 sq cm     Estimated Blood Loss:  None  Hemostasis Achieved:  by pressure    Procedural Pain:  4  / 10   Post Procedural Pain:  4 / 10     Response to treatment:  Well tolerated by patient. A culture was done. Plan:     Pt is not currently a smoker   - Discussed relationship of smoking and negative affects on wound healing   - Emphasized importance of tobacco avoidace/cessation   - Script for nicotine patch given to patient   - Information regarding support groups for smoking cessation given    In my professional opinion and based off the information that is available at this time this patient has appropriate indication for HBO Therapy: No    Treatment Note please see attached Discharge Instructions    Written patient dismissal instructions given to patient and signed by patient or POA.          Discharge Instructions       Visit Discharge/Physician Orders     Discharge condition: Stable     Assessment of pain at discharge: minimal     Anesthetic used: 4% lidocaine     Discharge to: Home     Left via:Private automobile     Accompanied by: n/a     ECF/[facility]HA: [facility] for dressing supplies     Dressing Orders: right leg: healed     Treatment Orders:      [address] [facility] followup visit ________discharge_____________________  (Please note your next appointment above and if you are unable to keep, kindly give a 24 hour notice.  Thank you.)     Physician signature:__________________________        If you experience any of the following, please call the [facility] during business hours:     * Increase in Pain  * Temperature over 101  * Increase in drainage from your wound  * Drainage with a foul odor  * Bleeding  * Increase in swelling  * Need for compression bandage changes due to slippage, breakthrough drainage.     If you need medical attention outside of the business hours of the [facility] please contact your PCP or go to the nearest emergency room.                                                                                                                                         Electronically signed by Chapito Garsia DPM on 3/21/2022 at 10:52 AM

## 2022-03-21 NOTE — PLAN OF CARE
Problem: Wound:  Goal: Will show signs of wound healing; wound closure and no evidence of infection  Description: Will show signs of wound healing; wound closure and no evidence of infection  Outcome: Completed     Problem: Pressure Ulcer:  Goal: Signs of wound healing will improve  Description: Signs of wound healing will improve  Outcome: Completed  Goal: Absence of new pressure ulcer  Description: Absence of new pressure ulcer  Outcome: Completed  Goal: Will show no infection signs and symptoms  Description: Will show no infection signs and symptoms  Outcome: Completed     Problem: Pain:  Goal: Pain level will decrease  Description: Pain level will decrease  Outcome: Completed  Goal: Control of acute pain  Description: Control of acute pain  Outcome: Completed  Goal: Control of chronic pain  Description: Control of chronic pain  Outcome: Completed

## 2022-05-26 ENCOUNTER — E-VISIT (OUTPATIENT)
Dept: PRIMARY CARE CLINIC | Age: 22
End: 2022-05-26
Payer: COMMERCIAL

## 2022-05-26 DIAGNOSIS — M25.539 PAIN IN WRIST, UNSPECIFIED LATERALITY: Primary | ICD-10-CM

## 2022-05-26 PROCEDURE — 99211 OFF/OP EST MAY X REQ PHY/QHP: CPT | Performed by: NURSE PRACTITIONER

## 2022-05-31 ENCOUNTER — APPOINTMENT (OUTPATIENT)
Dept: CT IMAGING | Age: 22
End: 2022-05-31
Payer: COMMERCIAL

## 2022-05-31 ENCOUNTER — HOSPITAL ENCOUNTER (EMERGENCY)
Age: 22
Discharge: HOME | End: 2022-05-31
Attending: STUDENT IN AN ORGANIZED HEALTH CARE EDUCATION/TRAINING PROGRAM
Payer: COMMERCIAL

## 2022-05-31 VITALS
SYSTOLIC BLOOD PRESSURE: 144 MMHG | RESPIRATION RATE: 22 BRPM | OXYGEN SATURATION: 98 % | HEART RATE: 94 BPM | TEMPERATURE: 98.1 F | DIASTOLIC BLOOD PRESSURE: 81 MMHG

## 2022-05-31 DIAGNOSIS — K76.0 FATTY LIVER: ICD-10-CM

## 2022-05-31 DIAGNOSIS — R10.84 GENERALIZED ABDOMINAL PAIN: Primary | ICD-10-CM

## 2022-05-31 LAB
ALBUMIN SERPL-MCNC: 4.2 G/DL (ref 3.5–5.2)
ALP BLD-CCNC: 127 U/L (ref 40–129)
ALT SERPL-CCNC: 46 U/L (ref 0–40)
ANION GAP SERPL CALCULATED.3IONS-SCNC: 11 MMOL/L (ref 7–16)
AST SERPL-CCNC: 36 U/L (ref 0–39)
BASOPHILS ABSOLUTE: 0.03 E9/L (ref 0–0.2)
BASOPHILS RELATIVE PERCENT: 0.3 % (ref 0–2)
BILIRUB SERPL-MCNC: 0.2 MG/DL (ref 0–1.2)
BUN BLDV-MCNC: 7 MG/DL (ref 6–20)
CALCIUM SERPL-MCNC: 9.2 MG/DL (ref 8.6–10.2)
CHLORIDE BLD-SCNC: 103 MMOL/L (ref 98–107)
CHP ED QC CHECK: NORMAL
CO2: 25 MMOL/L (ref 22–29)
CREAT SERPL-MCNC: 0.7 MG/DL (ref 0.7–1.2)
EOSINOPHILS ABSOLUTE: 0.06 E9/L (ref 0.05–0.5)
EOSINOPHILS RELATIVE PERCENT: 0.6 % (ref 0–6)
GFR AFRICAN AMERICAN: >60
GFR NON-AFRICAN AMERICAN: >60 ML/MIN/1.73
GLUCOSE BLD-MCNC: 147 MG/DL (ref 74–99)
HCT VFR BLD CALC: 47.1 % (ref 37–54)
HEMOCCULT STL QL: POSITIVE
HEMOGLOBIN: 15.4 G/DL (ref 12.5–16.5)
IMMATURE GRANULOCYTES #: 0.11 E9/L
IMMATURE GRANULOCYTES %: 1.1 % (ref 0–5)
LACTIC ACID: 2.2 MMOL/L (ref 0.5–2.2)
LIPASE: 24 U/L (ref 13–60)
LYMPHOCYTES ABSOLUTE: 2.11 E9/L (ref 1.5–4)
LYMPHOCYTES RELATIVE PERCENT: 22 % (ref 20–42)
MCH RBC QN AUTO: 29.7 PG (ref 26–35)
MCHC RBC AUTO-ENTMCNC: 32.7 % (ref 32–34.5)
MCV RBC AUTO: 90.9 FL (ref 80–99.9)
MONOCYTES ABSOLUTE: 0.71 E9/L (ref 0.1–0.95)
MONOCYTES RELATIVE PERCENT: 7.4 % (ref 2–12)
NEUTROPHILS ABSOLUTE: 6.58 E9/L (ref 1.8–7.3)
NEUTROPHILS RELATIVE PERCENT: 68.6 % (ref 43–80)
PDW BLD-RTO: 13.2 FL (ref 11.5–15)
PLATELET # BLD: 348 E9/L (ref 130–450)
PMV BLD AUTO: 9.7 FL (ref 7–12)
POTASSIUM REFLEX MAGNESIUM: 4.7 MMOL/L (ref 3.5–5)
RBC # BLD: 5.18 E12/L (ref 3.8–5.8)
SODIUM BLD-SCNC: 139 MMOL/L (ref 132–146)
TOTAL PROTEIN: 7.5 G/DL (ref 6.4–8.3)
WBC # BLD: 9.6 E9/L (ref 4.5–11.5)

## 2022-05-31 PROCEDURE — 6360000004 HC RX CONTRAST MEDICATION: Performed by: RADIOLOGY

## 2022-05-31 PROCEDURE — 83690 ASSAY OF LIPASE: CPT

## 2022-05-31 PROCEDURE — 80053 COMPREHEN METABOLIC PANEL: CPT

## 2022-05-31 PROCEDURE — 85025 COMPLETE CBC W/AUTO DIFF WBC: CPT

## 2022-05-31 PROCEDURE — 74177 CT ABD & PELVIS W/CONTRAST: CPT

## 2022-05-31 PROCEDURE — 83605 ASSAY OF LACTIC ACID: CPT

## 2022-05-31 PROCEDURE — 99285 EMERGENCY DEPT VISIT HI MDM: CPT

## 2022-05-31 RX ADMIN — IOPAMIDOL 75 ML: 755 INJECTION, SOLUTION INTRAVENOUS at 15:56

## 2022-05-31 ASSESSMENT — PAIN SCALES - GENERAL: PAINLEVEL_OUTOF10: 8

## 2022-05-31 NOTE — ED PROVIDER NOTES
ED Attending shared visit  CC: Tuyet       [facility]  Department of Emergency Medicine   ED  Encounter Note  Admit Date/RoomTime: 2022  2:14 PM  ED Room:     NAME: Jessica Lewis : 2000  MRN: [id number]     Chief Complaint:  Abdominal Cramping (stated that the complaint started 2 days ago no Emesis +nausea. no diarrhea)    History of Present Illness        Jessica Lewis is a 20 y.o. old male who presents to the emergency department by private vehicle, for sudden onset aching, sharp pain in the upper abdomen without radiation which began 2 day(s) prior to arrival.  There has been no similar episodes in the past.   Since onset the symptoms have been gradually worsening. The pain is associated with 1 episode of bloody stool approximately 45 minutes prior to arrival which she does state \"filled the bowl\" as well as nausea without any episodes of emesis. The pain is aggravated by nothing in particular and relieved by nothing. There has been NO chest pain, shortness of breath, fever, chills, urinary complaints. Patient reports that he does not take a blood thinner. Patient reports that he does have a history of heartburn. Patient reports that he does take Pepcid daily as directed. ROS   Pertinent positives and negatives are stated within HPI, all other systems reviewed and are negative. Past Medical History:  has a past medical history of Arthritis, Asperger syndrome, Asthma, Obesity, and Sleep apnea. Surgical History:  has a past surgical history that includes Tonsillectomy; Adenoidectomy; and [place] tooth extraction. Social History:  reports that he has never smoked. He has never used smokeless tobacco. He reports that he does not drink alcohol and does not use drugs. Family History: family history is not on file. Allergies: Patient has no known allergies.     Physical Exam   Oxygen Saturation Interpretation: Normal.        ED Triage Vitals   BP Temp Temp Source Heart Rate Resp SpO2 Height Weight   05/31/22 1309 05/31/22 1301 05/31/22 1301 05/31/22 1301 05/31/22 1301 05/31/22 1301 -- --   (!) 172/82 98.1 °F (36.7 °C) Infrared (!) 114 24 98 %         Physical Exam  General Appearance/Constitutional:  Alert, development consistent with age. HEENT:  NC/NT. PERRLA. Airway patent. Neck:  Supple. No lymphadenopathy. Respiratory: Lungs Clear to auscultation and breath sounds equal.  CV:  Regular rate and rhythm. GI:  normal appearing, non-distended with no visible hernias. Bowel sounds: normal bowel sounds. Tenderness: There is mild tenderness present - located diffusely in the upper abdomen., Acevedo's Sign: negative, McBurney's Sign: negative, Rovsing's Sign: negative. Liver: non-palpable. Spleen:  non-palpable. Back: CVA Tenderness: No CVA tenderness. Integument:  Normal turgor. Warm, dry, without visible rash, unless noted elsewhere. Neurological:  Orientation age-appropriate. Motor functions intact.     Lab / Imaging Results   (All laboratory and radiology results have been personally reviewed by myself)  Labs:  Results for orders placed or performed during the hospital encounter of 05/31/22   CBC with Auto Differential   Result Value Ref Range    WBC 9.6 4.5 - 11.5 E9/L    RBC 5.18 3.80 - 5.80 E12/L    Hemoglobin 15.4 12.5 - 16.5 g/dL    Hematocrit 47.1 37.0 - 54.0 %    MCV 90.9 80.0 - 99.9 fL    MCH 29.7 26.0 - 35.0 pg    MCHC 32.7 32.0 - 34.5 %    RDW 13.2 11.5 - 15.0 fL    Platelets 921 636 - 378 E9/L    MPV 9.7 7.0 - 12.0 fL    Neutrophils % 68.6 43.0 - 80.0 %    Immature Granulocytes % 1.1 0.0 - 5.0 %    Lymphocytes % 22.0 20.0 - 42.0 %    Monocytes % 7.4 2.0 - 12.0 %    Eosinophils % 0.6 0.0 - 6.0 %    Basophils % 0.3 0.0 - 2.0 %    Neutrophils Absolute 6.58 1.80 - 7.30 E9/L    Immature Granulocytes # 0.11 E9/L    Lymphocytes Absolute 2.11 1.50 - 4.00 E9/L    Monocytes Absolute 0.71 0.10 - 0.95 E9/L    Eosinophils Absolute 0.06 0.05 - 0.50 E9/L    Basophils Absolute 0.03 0.00 - 0.20 E9/L   Comprehensive Metabolic Panel w/ Reflex to MG   Result Value Ref Range    Sodium 139 132 - 146 mmol/L    Potassium reflex Magnesium 4.7 3.5 - 5.0 mmol/L    Chloride 103 98 - 107 mmol/L    CO2 25 22 - 29 mmol/L    Anion Gap 11 7 - 16 mmol/L    Glucose 147 (H) 74 - 99 mg/dL    BUN 7 6 - 20 mg/dL    CREATININE 0.7 0.7 - 1.2 mg/dL    GFR Non-African American >60 >=60 mL/min/1.73    GFR African American >60     Calcium 9.2 8.6 - 10.2 mg/dL    Total Protein 7.5 6.4 - 8.3 g/dL    Albumin 4.2 3.5 - 5.2 g/dL    Total Bilirubin 0.2 0.0 - 1.2 mg/dL    Alkaline Phosphatase 127 40 - 129 U/L    ALT 46 (H) 0 - 40 U/L    AST 36 0 - 39 U/L   Lipase   Result Value Ref Range    Lipase 24 13 - 60 U/L   Lactic Acid   Result Value Ref Range    Lactic Acid 2.2 0.5 - 2.2 mmol/L   POCT occult blood stool   Result Value Ref Range    OCCULT BLOOD FECAL positive     QC OK? ok      Imaging: All Radiology results interpreted by Radiologist unless otherwise noted. CT ABDOMEN PELVIS W IV CONTRAST Additional Contrast? None   Final Result   1. No acute process in abdomen or pelvis. 2.  Hepatic steatosis. 3.  Diverticulosis. ED Course / Medical Decision Making     Medications   iopamidol (ISOVUE-370) 76 % injection 75 mL (75 mLs IntraVENous Given 5/31/22 1556)        Re-Evaluations:  5/31/22      Time: 6:50 PM  Patients condition remains unchanged. Patient was notified of all lab work and imaging resulted thus far. Patient did have Hemoccult performed at this time via myself which did result as positive. Patient demonstrated no signs of hemorrhoids or gross rectal bleeding on exam.    Consultations:             None    Procedures:   none    MDM: Patient presented to ER today for 2 days of diffuse upper abdominal pain due to no known injury or exposure. Patient had complete work-up with an ED setting today.   Blood work demonstrated no signs of infection, blood loss, or electrolyte abnormalities. CT of the abdomen and pelvis did note hepatic steatosis with diverticulosis without signs of diverticulitis. There was no acute process within the abdomen or pelvis as read by radiology. Results were discussed with patient prior to disposition and all questions were answered. Patient did have bedside Hemoccult testing performed by myself with an ED which did demonstrate positive results without signs of hemorrhoid or gross rectal bleeding at time of exam.  Based on the fact that the patient is diffusely tender on upper abdomen without any acute pathology present with a history of GERD and some mild rectal bleeding on Hemoccult, I feel the patient most likely has a gastritis at this time which is appropriate for utilization of Pepcid daily, for which patient states he takes regularly, as well as a follow-up to GI doctor for upper and lower scope. Patient is appropriate for discharge home with follow-up as he is alert and oriented, no acute distress and afebrile. Patient is stable and demonstrates no red flag symptoms on lab work or imaging today. Patient is recommended to return to ER with new or worsening symptoms. Patient states he is both understandable and agreeable to this plan. Plan of Care/Counseling:  SARA Mercer and EM Attending Physician  reviewed today's visit with the patient and mother in addition to providing specific details for the plan of care and counseling regarding the diagnosis and prognosis. Questions are answered at this time and are agreeable with the plan. Assessment      1. Generalized abdominal pain    2. Fatty liver      This patient's ED course included: a personal history and physicial examination, re-evaluation prior to disposition and complex medical decision making and emergency management  This patient has remained hemodynamically stable during their ED course.    Plan   Discharged home  Patient condition is good. New Medications     New Prescriptions    No medications on file     Electronically signed by SARA Dove   DD: 5/31/22  **This report was transcribed using voice recognition software. Every effort was made to ensure accuracy; however, inadvertent computerized transcription errors may be present.   [place], [place]  05/31/22 6525

## 2022-05-31 NOTE — ED NOTES
Department of Emergency Medicine  FIRST PROVIDER TRIAGE NOTE             [facility]LP           5/31/22  1:15 PM EDT    Date of Encounter: 5/31/22   MRN: [id number]      HPI: Denver Flavors. is a 20 y.o. male who presents to the ED for Abdominal Cramping (stated that the complaint started 2 days ago no Emesis +nausea. no diarrhea)  Patient reports that his abdominal pain is left upper quadrant and right upper quadrant. He states that he did have 1 episode of blood filling the bowl during a bowel movement approximately 45 minutes prior to arrival.    ROS: Negative for cp, sob, back pain, fever, cough, vomiting, urinary complaints, headache or dizziness. PE: Gen Appearance/Constitutional: alert  CV: tachycardia  Pulm: CTA bilat  GI: tender to palpation     Initial Plan of Care: All treatment areas with department are currently occupied. Plan to order/Initiate the following while awaiting opening in ED: labs, imaging studies and analgesics.   Initiate Treatment-Testing, Proceed toTreatment Area When Bed Available for ED Attending/MLP to Continue Care    Electronically signed by SARA Ma   DD: 5/31/22         Balbir Ma  05/31/22 1161

## 2022-06-06 ENCOUNTER — APPOINTMENT (OUTPATIENT)
Dept: ULTRASOUND IMAGING | Age: 22
DRG: 392 | End: 2022-06-06
Payer: COMMERCIAL

## 2022-06-06 ENCOUNTER — HOSPITAL ENCOUNTER (INPATIENT)
Age: 22
LOS: 3 days | Discharge: HOME | DRG: 392 | End: 2022-06-09
Attending: EMERGENCY MEDICINE | Admitting: INTERNAL MEDICINE
Payer: COMMERCIAL

## 2022-06-06 ENCOUNTER — APPOINTMENT (OUTPATIENT)
Dept: CT IMAGING | Age: 22
DRG: 392 | End: 2022-06-06
Payer: COMMERCIAL

## 2022-06-06 DIAGNOSIS — K57.32 DIVERTICULITIS OF SIGMOID COLON: Primary | ICD-10-CM

## 2022-06-06 DIAGNOSIS — R10.13 EPIGASTRIC PAIN: ICD-10-CM

## 2022-06-06 PROBLEM — R19.8 PERFORATED ABDOMINAL VISCUS: Status: ACTIVE | Noted: 2022-06-06

## 2022-06-06 LAB
ALBUMIN SERPL-MCNC: 3.6 G/DL (ref 3.5–5.2)
ALP BLD-CCNC: 120 U/L (ref 40–129)
ALT SERPL-CCNC: 32 U/L (ref 0–40)
ANION GAP SERPL CALCULATED.3IONS-SCNC: 10 MMOL/L (ref 7–16)
AST SERPL-CCNC: 30 U/L (ref 0–39)
BACTERIA: ABNORMAL /HPF
BASOPHILS ABSOLUTE: 0.06 E9/L (ref 0–0.2)
BASOPHILS RELATIVE PERCENT: 0.3 % (ref 0–2)
BILIRUB SERPL-MCNC: 0.8 MG/DL (ref 0–1.2)
BILIRUBIN URINE: NEGATIVE
BLOOD, URINE: ABNORMAL
BUN BLDV-MCNC: 9 MG/DL (ref 6–20)
CALCIUM SERPL-MCNC: 9.4 MG/DL (ref 8.6–10.2)
CHLORIDE BLD-SCNC: 96 MMOL/L (ref 98–107)
CLARITY: CLEAR
CO2: 25 MMOL/L (ref 22–29)
COLOR: YELLOW
CREAT SERPL-MCNC: 0.8 MG/DL (ref 0.7–1.2)
CRYSTALS, UA: ABNORMAL /HPF
EOSINOPHILS ABSOLUTE: 0.08 E9/L (ref 0.05–0.5)
EOSINOPHILS RELATIVE PERCENT: 0.5 % (ref 0–6)
EPITHELIAL CELLS, UA: ABNORMAL /HPF
GFR AFRICAN AMERICAN: >60
GFR NON-AFRICAN AMERICAN: >60 ML/MIN/1.73
GLUCOSE BLD-MCNC: 158 MG/DL (ref 74–99)
GLUCOSE URINE: NEGATIVE MG/DL
HCT VFR BLD CALC: 44 % (ref 37–54)
HEMOGLOBIN: 14.2 G/DL (ref 12.5–16.5)
IMMATURE GRANULOCYTES #: 0.33 E9/L
IMMATURE GRANULOCYTES %: 1.9 % (ref 0–5)
KETONES, URINE: NEGATIVE MG/DL
LACTIC ACID: 1.6 MMOL/L (ref 0.5–2.2)
LEUKOCYTE ESTERASE, URINE: NEGATIVE
LIPASE: 18 U/L (ref 13–60)
LYMPHOCYTES ABSOLUTE: 2.13 E9/L (ref 1.5–4)
LYMPHOCYTES RELATIVE PERCENT: 12.2 % (ref 20–42)
MCH RBC QN AUTO: 29.4 PG (ref 26–35)
MCHC RBC AUTO-ENTMCNC: 32.3 % (ref 32–34.5)
MCV RBC AUTO: 91.1 FL (ref 80–99.9)
MONOCYTES ABSOLUTE: 1.23 E9/L (ref 0.1–0.95)
MONOCYTES RELATIVE PERCENT: 7.1 % (ref 2–12)
MUCUS: PRESENT /LPF
NEUTROPHILS ABSOLUTE: 13.6 E9/L (ref 1.8–7.3)
NEUTROPHILS RELATIVE PERCENT: 78 % (ref 43–80)
NITRITE, URINE: NEGATIVE
PDW BLD-RTO: 13.6 FL (ref 11.5–15)
PH UA: 6 (ref 5–9)
PLATELET # BLD: 340 E9/L (ref 130–450)
PMV BLD AUTO: 10.8 FL (ref 7–12)
POTASSIUM REFLEX MAGNESIUM: 4.2 MMOL/L (ref 3.5–5)
PROTEIN UA: NEGATIVE MG/DL
RBC # BLD: 4.83 E12/L (ref 3.8–5.8)
RBC UA: ABNORMAL /HPF (ref 0–2)
SODIUM BLD-SCNC: 131 MMOL/L (ref 132–146)
SPECIFIC GRAVITY UA: >=1.03 (ref 1–1.03)
TOTAL PROTEIN: 7.4 G/DL (ref 6.4–8.3)
UROBILINOGEN, URINE: 1 E.U./DL
WBC # BLD: 17.4 E9/L (ref 4.5–11.5)
WBC UA: ABNORMAL /HPF (ref 0–5)

## 2022-06-06 PROCEDURE — 1200000000 HC SEMI PRIVATE

## 2022-06-06 PROCEDURE — 6360000004 HC RX CONTRAST MEDICATION: Performed by: RADIOLOGY

## 2022-06-06 PROCEDURE — 6360000002 HC RX W HCPCS: Performed by: EMERGENCY MEDICINE

## 2022-06-06 PROCEDURE — 83690 ASSAY OF LIPASE: CPT

## 2022-06-06 PROCEDURE — 76705 ECHO EXAM OF ABDOMEN: CPT

## 2022-06-06 PROCEDURE — 96375 TX/PRO/DX INJ NEW DRUG ADDON: CPT

## 2022-06-06 PROCEDURE — 96361 HYDRATE IV INFUSION ADD-ON: CPT

## 2022-06-06 PROCEDURE — 2580000003 HC RX 258: Performed by: EMERGENCY MEDICINE

## 2022-06-06 PROCEDURE — 85025 COMPLETE CBC W/AUTO DIFF WBC: CPT

## 2022-06-06 PROCEDURE — 74177 CT ABD & PELVIS W/CONTRAST: CPT

## 2022-06-06 PROCEDURE — 99223 1ST HOSP IP/OBS HIGH 75: CPT | Performed by: INTERNAL MEDICINE

## 2022-06-06 PROCEDURE — 83605 ASSAY OF LACTIC ACID: CPT

## 2022-06-06 PROCEDURE — 81001 URINALYSIS AUTO W/SCOPE: CPT

## 2022-06-06 PROCEDURE — 2500000003 HC RX 250 WO HCPCS: Performed by: EMERGENCY MEDICINE

## 2022-06-06 PROCEDURE — C9113 INJ PANTOPRAZOLE SODIUM, VIA: HCPCS | Performed by: EMERGENCY MEDICINE

## 2022-06-06 PROCEDURE — 96374 THER/PROPH/DIAG INJ IV PUSH: CPT

## 2022-06-06 PROCEDURE — 99285 EMERGENCY DEPT VISIT HI MDM: CPT

## 2022-06-06 PROCEDURE — 80053 COMPREHEN METABOLIC PANEL: CPT

## 2022-06-06 RX ORDER — ALBUTEROL SULFATE 90 UG/1
2 AEROSOL, METERED RESPIRATORY (INHALATION) EVERY 6 HOURS PRN
COMMUNITY

## 2022-06-06 RX ORDER — ONDANSETRON 2 MG/ML
4 INJECTION INTRAMUSCULAR; INTRAVENOUS EVERY 6 HOURS PRN
Status: DISCONTINUED | OUTPATIENT
Start: 2022-06-06 | End: 2022-06-09 | Stop reason: HOSPADM

## 2022-06-06 RX ORDER — PROMETHAZINE HYDROCHLORIDE 25 MG/1
12.5 TABLET ORAL EVERY 6 HOURS PRN
Status: DISCONTINUED | OUTPATIENT
Start: 2022-06-06 | End: 2022-06-09 | Stop reason: HOSPADM

## 2022-06-06 RX ORDER — 0.9 % SODIUM CHLORIDE 0.9 %
1000 INTRAVENOUS SOLUTION INTRAVENOUS ONCE
Status: COMPLETED | OUTPATIENT
Start: 2022-06-06 | End: 2022-06-06

## 2022-06-06 RX ORDER — ACETAMINOPHEN 650 MG/1
650 SUPPOSITORY RECTAL EVERY 6 HOURS PRN
Status: DISCONTINUED | OUTPATIENT
Start: 2022-06-06 | End: 2022-06-09 | Stop reason: HOSPADM

## 2022-06-06 RX ORDER — POLYETHYLENE GLYCOL 3350 17 G/17G
17 POWDER, FOR SOLUTION ORAL DAILY PRN
Status: DISCONTINUED | OUTPATIENT
Start: 2022-06-06 | End: 2022-06-09 | Stop reason: HOSPADM

## 2022-06-06 RX ORDER — KETOROLAC TROMETHAMINE 30 MG/ML
30 INJECTION, SOLUTION INTRAMUSCULAR; INTRAVENOUS ONCE
Status: COMPLETED | OUTPATIENT
Start: 2022-06-06 | End: 2022-06-06

## 2022-06-06 RX ORDER — LISINOPRIL 10 MG/1
10 TABLET ORAL DAILY
COMMUNITY
Start: 2022-02-01

## 2022-06-06 RX ORDER — TOPIRAMATE 50 MG/1
50 TABLET, FILM COATED ORAL DAILY
COMMUNITY
Start: 2021-12-07

## 2022-06-06 RX ORDER — METRONIDAZOLE 500 MG/100ML
500 INJECTION, SOLUTION INTRAVENOUS EVERY 8 HOURS
Status: DISCONTINUED | OUTPATIENT
Start: 2022-06-07 | End: 2022-06-09

## 2022-06-06 RX ORDER — METRONIDAZOLE 500 MG/100ML
500 INJECTION, SOLUTION INTRAVENOUS ONCE
Status: COMPLETED | OUTPATIENT
Start: 2022-06-06 | End: 2022-06-07

## 2022-06-06 RX ORDER — SODIUM CHLORIDE 9 MG/ML
INJECTION, SOLUTION INTRAVENOUS CONTINUOUS
Status: DISCONTINUED | OUTPATIENT
Start: 2022-06-06 | End: 2022-06-09 | Stop reason: HOSPADM

## 2022-06-06 RX ORDER — ACETAMINOPHEN 325 MG/1
650 TABLET ORAL EVERY 6 HOURS PRN
Status: DISCONTINUED | OUTPATIENT
Start: 2022-06-06 | End: 2022-06-09 | Stop reason: HOSPADM

## 2022-06-06 RX ORDER — PANTOPRAZOLE SODIUM 40 MG/10ML
40 INJECTION, POWDER, LYOPHILIZED, FOR SOLUTION INTRAVENOUS ONCE
Status: COMPLETED | OUTPATIENT
Start: 2022-06-06 | End: 2022-06-06

## 2022-06-06 RX ADMIN — CEFTRIAXONE 2000 MG: 2 INJECTION, POWDER, FOR SOLUTION INTRAMUSCULAR; INTRAVENOUS at 22:34

## 2022-06-06 RX ADMIN — PANTOPRAZOLE SODIUM 40 MG: 40 INJECTION, POWDER, FOR SOLUTION INTRAVENOUS at 19:59

## 2022-06-06 RX ADMIN — KETOROLAC TROMETHAMINE 30 MG: 30 INJECTION, SOLUTION INTRAMUSCULAR at 20:01

## 2022-06-06 RX ADMIN — SODIUM CHLORIDE 1000 ML: 9 INJECTION, SOLUTION INTRAVENOUS at 19:56

## 2022-06-06 RX ADMIN — METRONIDAZOLE 500 MG: 500 INJECTION, SOLUTION INTRAVENOUS at 22:43

## 2022-06-06 RX ADMIN — IOPAMIDOL 75 ML: 755 INJECTION, SOLUTION INTRAVENOUS at 20:56

## 2022-06-06 RX ADMIN — IOHEXOL 50 ML: 240 INJECTION, SOLUTION INTRATHECAL; INTRAVASCULAR; INTRAVENOUS; ORAL at 20:56

## 2022-06-06 ASSESSMENT — PAIN SCALES - GENERAL
PAINLEVEL_OUTOF10: 5
PAINLEVEL_OUTOF10: 8

## 2022-06-06 ASSESSMENT — ENCOUNTER SYMPTOMS
COUGH: 0
WHEEZING: 0
BACK PAIN: 0
NAUSEA: 0
SORE THROAT: 0
EYE REDNESS: 0
SINUS PRESSURE: 0
VOMITING: 0
EYE PAIN: 0
DIARRHEA: 0
ABDOMINAL PAIN: 1
SHORTNESS OF BREATH: 0
EYE DISCHARGE: 0

## 2022-06-06 ASSESSMENT — PAIN DESCRIPTION - ORIENTATION: ORIENTATION: LEFT;MID

## 2022-06-06 ASSESSMENT — PAIN DESCRIPTION - LOCATION
LOCATION: ABDOMEN
LOCATION: ABDOMEN

## 2022-06-06 NOTE — ED PROVIDER NOTES
Patient is a 21 y/o male who presents to the ED with abdominal pain. Patient states he has had upper abdominal pain for approximately one week. The pain has been constant and radiates across his upper abdomen. Currently, it is 8/10. He denies any fever, nausea, vomiting or diarrhea. He reports soft, slimy stools. He denies any blood in the stool. He also reports burning with urination. He states that he called gastroenterology and they told him to come here for consultation. Review of Systems   Constitutional: Negative for chills and fever. HENT: Negative for ear pain, sinus pressure and sore throat. Eyes: Negative for pain, discharge and redness. Respiratory: Negative for cough, shortness of breath and wheezing. Cardiovascular: Negative for chest pain. Gastrointestinal: Positive for abdominal pain. Negative for diarrhea, nausea and vomiting. Genitourinary: Positive for dysuria. Negative for frequency. Musculoskeletal: Negative for arthralgias and back pain. Skin: Negative for rash and wound. Neurological: Negative for weakness and headaches. Hematological: Negative for adenopathy. All other systems reviewed and are negative. Physical Exam  Vitals and nursing note reviewed. Constitutional:       General: He is not in acute distress. Appearance: He is obese. HENT:      Head: Normocephalic and atraumatic. Mouth/Throat:      Mouth: Mucous membranes are moist.   Eyes:      Pupils: Pupils are equal, round, and reactive to light. Cardiovascular:      Rate and Rhythm: Regular rhythm. Tachycardia present. Heart sounds: No murmur heard. Pulmonary:      Effort: Pulmonary effort is normal. No respiratory distress. Breath sounds: Normal breath sounds. No stridor. No wheezing, rhonchi or rales. Abdominal:      General: Abdomen is protuberant. Bowel sounds are normal.      Palpations: Abdomen is soft. Tenderness:  There is abdominal tenderness in the epigastric area and left upper quadrant. There is no guarding. Negative signs include Acevedo's sign and McBurney's sign. Skin:     General: Skin is warm and dry. Findings: No rash. Neurological:      Mental Status: He is alert and oriented to person, place, and time. Procedures     [facility]             --------------------------------------------- PAST HISTORY ---------------------------------------------  Past Medical History:  has a past medical history of Arthritis, Asperger syndrome, Asthma, Obesity, and Sleep apnea. Past Surgical History:  has a past surgical history that includes Tonsillectomy; Adenoidectomy; and [place] tooth extraction. Social History:  reports that he has never smoked. He has never used smokeless tobacco. He reports that he does not drink alcohol and does not use drugs. Family History: family history is not on file. The patients home medications have been reviewed. Allergies: Patient has no known allergies.     -------------------------------------------------- RESULTS -------------------------------------------------    LABS:  Results for orders placed or performed during the hospital encounter of 06/06/22   CBC with Auto Differential   Result Value Ref Range    WBC 17.4 (H) 4.5 - 11.5 E9/L    RBC 4.83 3.80 - 5.80 E12/L    Hemoglobin 14.2 12.5 - 16.5 g/dL    Hematocrit 44.0 37.0 - 54.0 %    MCV 91.1 80.0 - 99.9 fL    MCH 29.4 26.0 - 35.0 pg    MCHC 32.3 32.0 - 34.5 %    RDW 13.6 11.5 - 15.0 fL    Platelets 631 002 - 070 E9/L    MPV 10.8 7.0 - 12.0 fL    Neutrophils % 78.0 43.0 - 80.0 %    Immature Granulocytes % 1.9 0.0 - 5.0 %    Lymphocytes % 12.2 (L) 20.0 - 42.0 %    Monocytes % 7.1 2.0 - 12.0 %    Eosinophils % 0.5 0.0 - 6.0 %    Basophils % 0.3 0.0 - 2.0 %    Neutrophils Absolute 13.60 (H) 1.80 - 7.30 E9/L    Immature Granulocytes # 0.33 E9/L    Lymphocytes Absolute 2.13 1.50 - 4.00 E9/L    Monocytes Absolute 1.23 (H) 0.10 - 0.95 E9/L    Eosinophils Absolute 0.08 0.05 - 0.50 E9/L    Basophils Absolute 0.06 0.00 - 0.20 E9/L   Comprehensive Metabolic Panel w/ Reflex to MG   Result Value Ref Range    Sodium 131 (L) 132 - 146 mmol/L    Potassium reflex Magnesium 4.2 3.5 - 5.0 mmol/L    Chloride 96 (L) 98 - 107 mmol/L    CO2 25 22 - 29 mmol/L    Anion Gap 10 7 - 16 mmol/L    Glucose 158 (H) 74 - 99 mg/dL    BUN 9 6 - 20 mg/dL    CREATININE 0.8 0.7 - 1.2 mg/dL    GFR Non-African American >60 >=60 mL/min/1.73    GFR African American >60     Calcium 9.4 8.6 - 10.2 mg/dL    Total Protein 7.4 6.4 - 8.3 g/dL    Albumin 3.6 3.5 - 5.2 g/dL    Total Bilirubin 0.8 0.0 - 1.2 mg/dL    Alkaline Phosphatase 120 40 - 129 U/L    ALT 32 0 - 40 U/L    AST 30 0 - 39 U/L   Lactic Acid   Result Value Ref Range    Lactic Acid 1.6 0.5 - 2.2 mmol/L   Lipase   Result Value Ref Range    Lipase 18 13 - 60 U/L   Urinalysis with Microscopic   Result Value Ref Range    Color, UA Yellow Straw/Yellow    Clarity, UA Clear Clear    Glucose, Ur Negative Negative mg/dL    Bilirubin Urine Negative Negative    Ketones, Urine Negative Negative mg/dL    Specific Gravity, UA >=1.030 1.005 - 1.030    Blood, Urine TRACE (A) Negative    pH, UA 6.0 5.0 - 9.0    Protein, UA Negative Negative mg/dL    Urobilinogen, Urine 1.0 <2.0 E.U./dL    Nitrite, Urine Negative Negative    Leukocyte Esterase, Urine Negative Negative    Mucus, UA Present (A) None Seen /LPF    WBC, UA 0-1 0 - 5 /HPF    RBC, UA 1-3 0 - 2 /HPF    Epithelial Cells, UA FEW /HPF    Bacteria, UA FEW (A) None Seen /HPF    Crystals, UA Many (A) None Seen /HPF       RADIOLOGY:  CT ABDOMEN PELVIS W IV CONTRAST Additional Contrast? Oral   Preliminary Result   Findings are compatible with acute sigmoid diverticulitis with possible   localized perforation in the sigmoid mesentery. US GALLBLADDER RUQ   Final Result   Enlarged fatty liver.       No sonographic evidence of acute cholecystitis.                 ------------------------- NURSING NOTES AND VITALS REVIEWED ---------------------------  Date / Time Roomed:  6/6/2022  7:02 PM  ED Bed Assignment:  26/26    The nursing notes within the ED encounter and vital signs as below have been reviewed. Patient Vitals for the past 24 hrs:   BP Temp Pulse Resp SpO2 Weight   06/06/22 2131 (!) 163/71 -- 99 20 97 % --   06/06/22 2021 -- -- -- -- -- (!) 392 lb (177.8 kg)   06/06/22 1811 (!) 175/78 -- (!) 115 -- -- --   06/06/22 1806 -- 97.7 °F (36.5 °C) (!) 120 22 98 % --       Oxygen Saturation Interpretation: Normal    ------------------------------------------ PROGRESS NOTES ------------------------------------------  Re-evaluation(s):  Time: 2145. Patients symptoms are improving  Repeat physical examination is not changed    Counseling:  I have spoken with the patient and discussed todays results, in addition to providing specific details for the plan of care and counseling regarding the diagnosis and prognosis. Their questions are answered at this time and they are agreeable with the plan of admission.    --------------------------------- ADDITIONAL PROVIDER NOTES ---------------------------------  Consultations:  Time: 2140. Spoke with Dr. Evelin Liu.  Discussed case. They will admit the patient. Spoke with Dr. Rasheeda Mckeon (Surgery). Discussed the case. They will provide consultation. This patient's ED course included: a personal history and physicial examination, re-evaluation prior to disposition and IV medications    This patient has remained hemodynamically stable during their ED course. Diagnosis:  1. Diverticulitis of sigmoid colon    2. Epigastric pain        Disposition:  Patient's disposition: Admit to med/surg floor  Patient's condition is stable.          [facility],   06/06/22 2151 [facility],   06/06/22 2221

## 2022-06-06 NOTE — ED NOTES
Department of Emergency Medicine  FIRST PROVIDER TRIAGE NOTE             Independent [facility]           6/6/22  6:11 PM EDT    Date of Encounter: 6/6/22   MRN: [id number]      HPI: Blank Brice. is a 20 y.o. male who presents to the ED for Abdominal Pain (left side pain with burning on urination new onset . Pt. denies rectal bleeding )    Pt was here a week ago for the same pain but reports it is getting worse and now having burning with urination. Has not yet been able to follow up with GI.     ROS: Negative for fever or vomiting. PE: Gen Appearance/Constitutional: alert  Musculoskeletal: moves all extremities x 4     Initial Plan of Care: All treatment areas with department are currently occupied. Plan to order/Initiate the following while awaiting opening in ED: labs.   Initiate Treatment-Testing, Proceed toTreatment Area When Bed Available for ED Attending/MLP to Continue Care    Electronically signed by Nalini Funk PA-C   DD: 6/6/22         Nalini Funk PA-C  06/06/22 5088

## 2022-06-07 PROBLEM — K57.20 PERFORATION OF SIGMOID COLON DUE TO DIVERTICULITIS: Status: ACTIVE | Noted: 2022-06-07

## 2022-06-07 PROBLEM — K57.32 DIVERTICULITIS OF SIGMOID COLON: Status: ACTIVE | Noted: 2022-06-07

## 2022-06-07 PROBLEM — N39.0 UTI (URINARY TRACT INFECTION): Status: ACTIVE | Noted: 2022-06-07

## 2022-06-07 PROBLEM — E66.01 CLASS 3 SEVERE OBESITY DUE TO EXCESS CALORIES WITHOUT SERIOUS COMORBIDITY WITH BODY MASS INDEX (BMI) OF 60.0 TO 69.9 IN ADULT (HCC): Status: ACTIVE | Noted: 2022-06-07

## 2022-06-07 PROBLEM — E66.813 CLASS 3 SEVERE OBESITY DUE TO EXCESS CALORIES WITHOUT SERIOUS COMORBIDITY WITH BODY MASS INDEX (BMI) OF 60.0 TO 69.9 IN ADULT: Status: ACTIVE | Noted: 2022-06-07

## 2022-06-07 LAB
ALBUMIN SERPL-MCNC: 3.2 G/DL (ref 3.5–5.2)
ALP BLD-CCNC: 111 U/L (ref 40–129)
ALT SERPL-CCNC: 29 U/L (ref 0–40)
ANION GAP SERPL CALCULATED.3IONS-SCNC: 12 MMOL/L (ref 7–16)
AST SERPL-CCNC: 34 U/L (ref 0–39)
BASOPHILS ABSOLUTE: 0.05 E9/L (ref 0–0.2)
BASOPHILS RELATIVE PERCENT: 0.4 % (ref 0–2)
BILIRUB SERPL-MCNC: 0.6 MG/DL (ref 0–1.2)
BUN BLDV-MCNC: 9 MG/DL (ref 6–20)
CALCIUM SERPL-MCNC: 8.6 MG/DL (ref 8.6–10.2)
CHLORIDE BLD-SCNC: 100 MMOL/L (ref 98–107)
CO2: 24 MMOL/L (ref 22–29)
CREAT SERPL-MCNC: 0.7 MG/DL (ref 0.7–1.2)
EOSINOPHILS ABSOLUTE: 0.15 E9/L (ref 0.05–0.5)
EOSINOPHILS RELATIVE PERCENT: 1.1 % (ref 0–6)
GFR AFRICAN AMERICAN: >60
GFR NON-AFRICAN AMERICAN: >60 ML/MIN/1.73
GLUCOSE BLD-MCNC: 101 MG/DL (ref 74–99)
HCT VFR BLD CALC: 42.5 % (ref 37–54)
HEMOGLOBIN: 13.4 G/DL (ref 12.5–16.5)
IMMATURE GRANULOCYTES #: 0.39 E9/L
IMMATURE GRANULOCYTES %: 2.9 % (ref 0–5)
LYMPHOCYTES ABSOLUTE: 2.17 E9/L (ref 1.5–4)
LYMPHOCYTES RELATIVE PERCENT: 16.4 % (ref 20–42)
MCH RBC QN AUTO: 28.8 PG (ref 26–35)
MCHC RBC AUTO-ENTMCNC: 31.5 % (ref 32–34.5)
MCV RBC AUTO: 91.2 FL (ref 80–99.9)
MONOCYTES ABSOLUTE: 1.28 E9/L (ref 0.1–0.95)
MONOCYTES RELATIVE PERCENT: 9.7 % (ref 2–12)
NEUTROPHILS ABSOLUTE: 9.21 E9/L (ref 1.8–7.3)
NEUTROPHILS RELATIVE PERCENT: 69.5 % (ref 43–80)
PDW BLD-RTO: 13.7 FL (ref 11.5–15)
PLATELET # BLD: 268 E9/L (ref 130–450)
PMV BLD AUTO: 10.1 FL (ref 7–12)
POTASSIUM REFLEX MAGNESIUM: 4.5 MMOL/L (ref 3.5–5)
RBC # BLD: 4.66 E12/L (ref 3.8–5.8)
SODIUM BLD-SCNC: 136 MMOL/L (ref 132–146)
TOTAL PROTEIN: 6.9 G/DL (ref 6.4–8.3)
WBC # BLD: 13.3 E9/L (ref 4.5–11.5)

## 2022-06-07 PROCEDURE — APPSS45 APP SPLIT SHARED TIME 31-45 MINUTES: Performed by: PHYSICIAN ASSISTANT

## 2022-06-07 PROCEDURE — 36415 COLL VENOUS BLD VENIPUNCTURE: CPT

## 2022-06-07 PROCEDURE — 6360000002 HC RX W HCPCS: Performed by: INTERNAL MEDICINE

## 2022-06-07 PROCEDURE — 80053 COMPREHEN METABOLIC PANEL: CPT

## 2022-06-07 PROCEDURE — 6370000000 HC RX 637 (ALT 250 FOR IP): Performed by: INTERNAL MEDICINE

## 2022-06-07 PROCEDURE — 1200000000 HC SEMI PRIVATE

## 2022-06-07 PROCEDURE — 99222 1ST HOSP IP/OBS MODERATE 55: CPT | Performed by: SURGERY

## 2022-06-07 PROCEDURE — 2580000003 HC RX 258: Performed by: INTERNAL MEDICINE

## 2022-06-07 PROCEDURE — 76937 US GUIDE VASCULAR ACCESS: CPT

## 2022-06-07 PROCEDURE — 6370000000 HC RX 637 (ALT 250 FOR IP): Performed by: PHYSICIAN ASSISTANT

## 2022-06-07 PROCEDURE — 2500000003 HC RX 250 WO HCPCS: Performed by: INTERNAL MEDICINE

## 2022-06-07 PROCEDURE — 85025 COMPLETE CBC W/AUTO DIFF WBC: CPT

## 2022-06-07 PROCEDURE — 99232 SBSQ HOSP IP/OBS MODERATE 35: CPT | Performed by: STUDENT IN AN ORGANIZED HEALTH CARE EDUCATION/TRAINING PROGRAM

## 2022-06-07 RX ORDER — AMLODIPINE BESYLATE 5 MG/1
5 TABLET ORAL DAILY
Status: DISCONTINUED | OUTPATIENT
Start: 2022-06-07 | End: 2022-06-07

## 2022-06-07 RX ORDER — AMLODIPINE BESYLATE 5 MG/1
5 TABLET ORAL DAILY
Status: DISCONTINUED | OUTPATIENT
Start: 2022-06-07 | End: 2022-06-08

## 2022-06-07 RX ORDER — HYDROMORPHONE HYDROCHLORIDE 1 MG/ML
0.5 INJECTION, SOLUTION INTRAMUSCULAR; INTRAVENOUS; SUBCUTANEOUS EVERY 4 HOURS PRN
Status: DISCONTINUED | OUTPATIENT
Start: 2022-06-07 | End: 2022-06-08

## 2022-06-07 RX ADMIN — METRONIDAZOLE 500 MG: 500 INJECTION, SOLUTION INTRAVENOUS at 14:08

## 2022-06-07 RX ADMIN — METRONIDAZOLE 500 MG: 500 INJECTION, SOLUTION INTRAVENOUS at 21:37

## 2022-06-07 RX ADMIN — AMLODIPINE BESYLATE 5 MG: 5 TABLET ORAL at 14:02

## 2022-06-07 RX ADMIN — SODIUM CHLORIDE: 9 INJECTION, SOLUTION INTRAVENOUS at 16:23

## 2022-06-07 RX ADMIN — ACETAMINOPHEN 650 MG: 325 TABLET ORAL at 09:52

## 2022-06-07 RX ADMIN — CEFTRIAXONE SODIUM 1000 MG: 1 INJECTION, POWDER, FOR SOLUTION INTRAMUSCULAR; INTRAVENOUS at 09:53

## 2022-06-07 RX ADMIN — SODIUM CHLORIDE 100 ML/HR: 9 INJECTION, SOLUTION INTRAVENOUS at 00:06

## 2022-06-07 ASSESSMENT — PAIN DESCRIPTION - PAIN TYPE
TYPE: ACUTE PAIN
TYPE: ACUTE PAIN

## 2022-06-07 ASSESSMENT — PAIN DESCRIPTION - DIRECTION: RADIATING_TOWARDS: MID ABDOMEN

## 2022-06-07 ASSESSMENT — PAIN DESCRIPTION - FREQUENCY
FREQUENCY: INTERMITTENT
FREQUENCY: INTERMITTENT

## 2022-06-07 ASSESSMENT — PAIN DESCRIPTION - LOCATION
LOCATION: ABDOMEN
LOCATION: ABDOMEN

## 2022-06-07 ASSESSMENT — PAIN SCALES - GENERAL
PAINLEVEL_OUTOF10: 0
PAINLEVEL_OUTOF10: 4
PAINLEVEL_OUTOF10: 5

## 2022-06-07 ASSESSMENT — PAIN DESCRIPTION - ORIENTATION
ORIENTATION: UPPER;LEFT
ORIENTATION: LOWER;MID

## 2022-06-07 ASSESSMENT — PAIN DESCRIPTION - ONSET
ONSET: ON-GOING
ONSET: ON-GOING

## 2022-06-07 ASSESSMENT — PAIN DESCRIPTION - DESCRIPTORS
DESCRIPTORS: CRAMPING
DESCRIPTORS: CRAMPING

## 2022-06-07 NOTE — H&P
[address] [place], [place] [address]ist Group   HISTORY AND PHYSICAL EXAM      AUTHOR: Sharlon Ganser, MD PATIENT NAME: Juan Daniel Mcclure. DATE: 2022 MRN: [id number], : 2000   Primary Care Physician: CATIE Hernandez - [facility]     CHIEF COMPLAINT / REASON FOR ADMISSION:  Abdominal Pain,      HPI:   This is a 20 y.o. male  has a past medical history of Arthritis, Asperger syndrome, Asthma, Obesity, and Sleep apnea. presented with abdominal pain for last few days prior to arrival to the hospital.  Pain is severe, left lower quadrant mostly. Initially abdominal pain was mild, intermittent but gradually getting more persistent. Started gradually. The patient was seen and examined at bedside, appears alert and awake with no acute distress and is able to answer simple  questions. On direct questioning, patient denied any  resting ongoing chest pain, resting SOB, hemoptysis, productive cough, fever, ongoing palpitation, hematemesis, rectal bleeding, román, hematuria, any other  and GI complaints and any new focal neuro deficits. ROS:  Pertinent positives and negatives are noted in the HPI, all other systems are reviewed and negative    PMH:  Past Medical History:   Diagnosis Date    Arthritis     Asperger syndrome     Asthma     Obesity     Sleep apnea        Surgical History:  Past Surgical History:   Procedure Laterality Date    ADENOIDECTOMY      TONSILLECTOMY      WISDOM TOOTH EXTRACTION         Medications Prior to Admission:    Prior to Admission medications    Medication Sig Start Date End Date Taking?  Authorizing Provider   albuterol sulfate HFA (VENTOLIN HFA) 108 (90 Base) MCG/ACT inhaler Inhale 2 puffs into the lungs every 6 hours as needed for Wheezing or Shortness of Breath   Yes Historical Provider, MD   lisinopril (PRINIVIL;ZESTRIL) 10 MG tablet Take 10 mg by mouth daily 22  Yes Historical Provider, MD   topiramate (TOPAMAX) 50 mg tablet Take 50 mg by mouth daily 21  Yes Historical Provider, MD   cetirizine (ZYRTEC) 10 MG tablet Take 10 mg by mouth daily  2/1/22   Historical Provider, MD   vitamin D (ERGOCALCIFEROL) 1.25 MG (33590 UT) CAPS capsule Take 50,000 Units by mouth once a week Monday 12/7/21   Historical Provider, MD   famotidine (PEPCID) 40 MG tablet Take 40 mg by mouth daily  3/18/20   Historical Provider, MD   guanFACINE HCl ER (INTUNIV) 3 MG TB24 tablet Take 3 mg by mouth daily     Historical Provider, MD   ibuprofen (ADVIL;MOTRIN) 600 MG tablet Take 1 tablet by mouth every 8 hours as needed for Pain 7/2/20 1/6/22  Pedro Anderson MD   pimozide (ORAP) 2 MG tablet Take 2 mg by mouth nightly     Historical Provider, MD   sertraline (ZOLOFT) 25 MG tablet Take 25 mg by mouth daily  12/7/19   Historical Provider, MD       Allergies:    Patient has no known allergies. Social History:    reports that he has never smoked. He has never used smokeless tobacco. He reports current alcohol use of about 1.0 standard drink of alcohol per week. He reports that he does not use drugs. Family History:   family history is not on file. Family History: No family history of sudden cardiac death, structural malformation, coagulopathy, known genetic diease or any other significant family hisotry that can contribute to the chief complaint/current illness  PHYSICAL EXAM:  Vitals:  BP (!) 150/76   Pulse 93   Temp 97.1 °F (36.2 °C) (Oral)   Resp 18   Wt (!) 392 lb (177.8 kg)   SpO2 95%   BMI 61.40 kg/m²   GENERAL: No acute distress, Alert and awake, Afebrile, Appears tired and weak otherwise hemodynamically stable at present. HEENT: PERRLA, no icterus. OP clear and no exudates. NECK: Supple  no carotid/ophthalmic bruits, JVD None. RESPIRATORY:  Bilateral equal vesicular breath sound with no wheezing. Lung bases are clear. HEART: No tachycardia at bedside and regular rhythm. Normal S1 and S2, No S3 or S4 is audible. No pulsation, thrills, murmur or friction rubs. ABDOMEN: Soft and nondistended but has lower abdominal/ suprepubic pain and tenderness with out guarding or peritoneal signs. No hepatomegaly or splenomegaly. No CVA tenderness on the both sides. Bowel sound is present. EXTREMITIES: All peripheral pulses are present. No calf tenderness or swelling. No pedal edema is present. Darren Calvillo NEUROLOGY: Alert and awake. No new focal neuro deficit. Bilateral Pupil is equal and reactive to light. CN-ii-xii otherwise grossly intact. Motor and Sensory: Grossly Intact bilaterally with no new focal signs   LABS:  Recent Labs     06/06/22  1827   WBC 17.4*   RBC 4.83   HGB 14.2   HCT 44.0   MCV 91.1   MCH 29.4   MCHC 32.3   RDW 13.6      MPV 10.8     Recent Labs     06/06/22  1827   *   K 4.2   CL 96*   CO2 25   BUN 9   CREATININE 0.8   GLUCOSE 158*   CALCIUM 9.4     No results for input(s): POCGLU in the last 72 hours.   Results for orders placed or performed during the hospital encounter of 06/06/22   CBC with Auto Differential   Result Value Ref Range    WBC 17.4 (H) 4.5 - 11.5 E9/L    RBC 4.83 3.80 - 5.80 E12/L    Hemoglobin 14.2 12.5 - 16.5 g/dL    Hematocrit 44.0 37.0 - 54.0 %    MCV 91.1 80.0 - 99.9 fL    MCH 29.4 26.0 - 35.0 pg    MCHC 32.3 32.0 - 34.5 %    RDW 13.6 11.5 - 15.0 fL    Platelets 850 288 - 493 E9/L    MPV 10.8 7.0 - 12.0 fL    Neutrophils % 78.0 43.0 - 80.0 %    Immature Granulocytes % 1.9 0.0 - 5.0 %    Lymphocytes % 12.2 (L) 20.0 - 42.0 %    Monocytes % 7.1 2.0 - 12.0 %    Eosinophils % 0.5 0.0 - 6.0 %    Basophils % 0.3 0.0 - 2.0 %    Neutrophils Absolute 13.60 (H) 1.80 - 7.30 E9/L    Immature Granulocytes # 0.33 E9/L    Lymphocytes Absolute 2.13 1.50 - 4.00 E9/L    Monocytes Absolute 1.23 (H) 0.10 - 0.95 E9/L    Eosinophils Absolute 0.08 0.05 - 0.50 E9/L    Basophils Absolute 0.06 0.00 - 0.20 E9/L   Comprehensive Metabolic Panel w/ Reflex to MG   Result Value Ref Range    Sodium 131 (L) 132 - 146 mmol/L    Potassium reflex Magnesium 4.2 3.5 - 5.0 mmol/L    Chloride 96 (L) 98 - 107 mmol/L    CO2 25 22 - 29 mmol/L    Anion Gap 10 7 - 16 mmol/L    Glucose 158 (H) 74 - 99 mg/dL    BUN 9 6 - 20 mg/dL    CREATININE 0.8 0.7 - 1.2 mg/dL    GFR Non-African American >60 >=60 mL/min/1.73    GFR African American >60     Calcium 9.4 8.6 - 10.2 mg/dL    Total Protein 7.4 6.4 - 8.3 g/dL    Albumin 3.6 3.5 - 5.2 g/dL    Total Bilirubin 0.8 0.0 - 1.2 mg/dL    Alkaline Phosphatase 120 40 - 129 U/L    ALT 32 0 - 40 U/L    AST 30 0 - 39 U/L   Lactic Acid   Result Value Ref Range    Lactic Acid 1.6 0.5 - 2.2 mmol/L   Lipase   Result Value Ref Range    Lipase 18 13 - 60 U/L   Urinalysis with Microscopic   Result Value Ref Range    Color, UA Yellow Straw/Yellow    Clarity, UA Clear Clear    Glucose, Ur Negative Negative mg/dL    Bilirubin Urine Negative Negative    Ketones, Urine Negative Negative mg/dL    Specific Gravity, UA >=1.030 1.005 - 1.030    Blood, Urine TRACE (A) Negative    pH, UA 6.0 5.0 - 9.0    Protein, UA Negative Negative mg/dL    Urobilinogen, Urine 1.0 <2.0 E.U./dL    Nitrite, Urine Negative Negative    Leukocyte Esterase, Urine Negative Negative    Mucus, UA Present (A) None Seen /LPF    WBC, UA 0-1 0 - 5 /HPF    RBC, UA 1-3 0 - 2 /HPF    Epithelial Cells, UA FEW /HPF    Bacteria, UA FEW (A) None Seen /HPF    Crystals, UA Many (A) None Seen /HPF        Radiology: CT ABDOMEN PELVIS W IV CONTRAST Additional Contrast? Oral    Result Date: 6/7/2022  EXAMINATION: CT OF THE ABDOMEN AND PELVIS WITH CONTRAST 6/6/2022 8:51 pm TECHNIQUE: CT of the abdomen and pelvis was performed with the administration of intravenous contrast. Multiplanar reformatted images are provided for review. Automated exposure control, iterative reconstruction, and/or weight based adjustment of the mA/kV was utilized to reduce the radiation dose to as low as reasonably achievable. COMPARISON: None.  HISTORY: ORDERING SYSTEM PROVIDED HISTORY: Epigastric pain, dysuria TECHNOLOGIST PROVIDED HISTORY: Reason for exam:->Epigastric pain, dysuria Additional Contrast?->Oral Decision Support Exception - unselect if not a suspected or confirmed emergency medical condition->Emergency Medical Condition (MA) FINDINGS: Lower Chest:  No infiltrate or effusion in the visible lower chest. Organs: Findings are compatible with diffuse hepatic steatosis. No definite acute abnormality is identified within limitations of beam hardening artifacts and early contrast phase relatively. GI/Bowel: No obstruction. Diverticulosis coli. Suspicion of acute diverticulitis at the sigmoid colon around an anterior diverticulum, seen as hazy/inflammatory changes in the sigmoid mesentery. As seen for example on image 151 there is possible localized perforation versus artifact. There appears to be wall thickening of the sigmoid colon through the area likely representing associated inflammation. Again note limited detailed assessment on exam related to body habitus Pelvis: The inflammatory changes are additionally around the urinary bladder although favored to be secondary. Advise correlation with urinalysis Peritoneum/Retroperitoneum: As described above. Bones/Soft Tissues: Mild spinal degenerative changes. Findings are compatible with acute sigmoid diverticulitis with possible localized perforation in the sigmoid mesentery. Limited exam.     US GALLBLADDER RUQ    Result Date: 6/6/2022  EXAMINATION: RIGHT UPPER QUADRANT ULTRASOUND 6/6/2022 7:36 pm COMPARISON: None. HISTORY: ORDERING SYSTEM PROVIDED HISTORY: epigastric pain, r/o cholecystitis TECHNOLOGIST PROVIDED HISTORY: Reason for exam:->epigastric pain, r/o cholecystitis What reading provider will be dictating this exam?->CRC FINDINGS: LIVER:  The liver is enlarged with increased echogenicity without evidence of intrahepatic biliary ductal dilatation. BILIARY SYSTEM:  Gallbladder is unremarkable without evidence of pericholecystic fluid, wall thickening or stones.   Negative sonographic Acevedo's sign. Common bile duct is within normal limits measuring 3.2 mm. RIGHT KIDNEY: The right kidney is grossly unremarkable without evidence of hydronephrosis. PANCREAS:  Visualized portions of the pancreas are unremarkable. OTHER: No evidence of right upper quadrant ascites. Enlarged fatty liver. No sonographic evidence of acute cholecystitis. ASSESSMENT:    Present on Admission:   Perforated abdominal viscus    PLAN:  # Severe signoid diverticultis with possible perforation   Abdomen is obese, no clear peritoneal signs, remains soft   NPO, IVF, PRN pain meds   IV Ceftriaxone + Metronidazole   Surgery consult  # Morbid Obesity  # Rest of the chronic medical problems are stable and will be managed with appropriately with home medications, placed nursing communication order to verify home medications before giving them to the patient. # Diet: NPO  # IVF's: Yes  # Fall Precaution: Yes  # Disposition: Home/primary residence   # Code Status: Full code by default  # DVT Prophylaxis : SC Heparin or SC Lovenox as on chart  The patient at bedside was counseled about clinical status, laboratory/imaging results, diagnoses, medication side effects, risk, and treatment plan, all questions were answered to patient's satisfaction and verbalized understanding      SIGNATURE: Karen Frias MD PATIENT NAME: Hussain Arreola. CONTACT #: Hospitalist on call MRN: [id number]     Disclaimer: Portions of this note may have been generated using Dragon voice recognition software. Reasonable efforts were made to correct any dictation errors that resulted due to the programming of this software but some may still be present.

## 2022-06-07 NOTE — CONSULTS
1 Jose Nolen MD    Patient's Name/Date of Birth: Roly Yarbrough / 2000    Date: June 7, 2022     Consulting Surgeon: Марина Ackerman MD    PCP: CATIE Pelletier NP     Chief Complaint: Abdominal pain    HPI:   Roly Yarbrough is a 20 y.o. male who presents for evaluation of abdominal pain. This has been ongoing for the last few days. He had severe pain the other night that kept him from sleeping and prompted visit to the emergency room. Pain has been worse in the left lower quadrant of the abdomen though he reports some upper abdominal pain this morning. He does report feeling better than yesterday. He has had no fevers or chills. He reports also some lower back pain with attempts at bowel movements however denies any urinary symptoms. He has never had anything like this before. He does report a significant family history of diverticulitis in his sister, his father, his uncle. He has had no colonoscopy in the past.    Patient Active Problem List   Diagnosis    Other sprain of right middle finger, initial encounter    Tendinitis    Pain of finger of right hand    Perforated abdominal viscus       No Known Allergies    Past Medical History:   Diagnosis Date    Arthritis     Asperger syndrome     Asthma     Obesity     Sleep apnea        Past Surgical History:   Procedure Laterality Date    ADENOIDECTOMY      TONSILLECTOMY      WISDOM TOOTH EXTRACTION         Social History     Socioeconomic History    Marital status: Single     Spouse name: Not on file    Number of children: Not on file    Years of education: Not on file    Highest education level: Not on file   Occupational History    Not on file   Tobacco Use    Smoking status: Never Smoker    Smokeless tobacco: Never Used   Vaping Use    Vaping Use: Former    Substances: Nicotine, not ready to quit   Substance and Sexual Activity    Alcohol use:  Yes Alcohol/week: 1.0 standard drink     Types: 1 Cans of beer per week     Comment: 1/month    Drug use: Never    Sexual activity: Not Currently   Other Topics Concern    Not on file   Social History Narrative    ** Merged History Encounter **          Social Determinants of Health     Financial Resource Strain:     Difficulty of Paying Living Expenses: Not on file   Food Insecurity:     Worried About Running Out of Food in the Last Year: Not on file    [place] of Food in the Last Year: Not on file   Transportation Needs:     Lack of Transportation (Medical): Not on file    Lack of Transportation (Non-Medical): Not on file   Physical Activity:     Days of Exercise per Week: Not on file    Minutes of Exercise per Session: Not on file   Stress:     Feeling of Stress : Not on file   Social Connections:     Frequency of Communication with Friends and Family: Not on file    Frequency of Social Gatherings with Friends and Family: Not on file    Attends [employer] Services: Not on file    Active Member of [address] [place] [address] [employer] or Organizations: Not on file    Attends Club or Organization Meetings: Not on file    Marital Status: Not on file   Intimate Partner Violence:     Fear of Current or Ex-Partner: Not on file    Emotionally Abused: Not on file    Physically Abused: Not on file    Sexually Abused: Not on file   Housing Stability:     Unable to Pay for Housing in the Last Year: Not on file    Number of [place] in the Last Year: Not on file    Unstable Housing in the Last Year: Not on file       The patient has a family history that is negative for severe cardiovascular or respiratory issues, negative for reaction to anesthesia.      Recent Labs     06/06/22 1827 06/07/22  0604   WBC 17.4* 13.3*   HGB 14.2 13.4   HCT 44.0 42.5   MCV 91.1 91.2    268       Recent Labs     06/06/22 1827 06/07/22  0604   * 136   K 4.2 4.5   CO2 25 24   BUN 9 9   CREATININE 0.8 0.7       Recent Labs     06/06/22 1827 06/07/22  0604   PROT 7.4 6.9   LIPASE 18  --          Intake/Output Summary (Last 24 hours) at 6/7/2022 5713  Last data filed at 6/7/2022 2871  Gross per 24 hour   Intake 524.86 ml   Output 300 ml   Net 224.86 ml       I have reviewed relevant labs from this admission and interpretation is included in my assessment and plan      Review of Systems  A complete 10 system review was performed and are otherwise negative unless mentioned in the above HPI. Specific negatives are listed below but may not include all those reviewed.     General ROS: negative obtundation, AMS  ENT ROS: negative rhinorrhea, epistaxis  Allergy and Immunology ROS: negative itchy/watery eyes or nasal congestion  Hematological and Lymphatic ROS: negative spontaneous bleeding or bruising  Endocrine ROS: negative  lethargy, mood swings, palpitations or polydipsia/polyuria  Respiratory ROS: negative sputum changes, stridor, tachypnea or wheezing  Cardiovascular ROS: negative for - loss of consciousness, murmur or orthopnea  Gastrointestinal ROS: negative for - hematochezia or hematemesis  Genito-Urinary ROS: negative for -  genital discharge or hematuria  Musculoskeletal ROS: negative for - focal weakness, gangrene  Psych/Neuro ROS: negative for - visual or auditory hallucinations, suicidal ideation      Physical exam:   BP (!) 150/76   Pulse 93   Temp 97.1 °F (36.2 °C) (Oral)   Resp 18   Wt (!) 392 lb (177.8 kg)   SpO2 95%   BMI 61.40 kg/m²   General appearance:  NAD, appears stated age  Head: NCAT, PERRLA, EOMI, red conjunctiva  Neck: supple, no masses, trachea midline  Lungs: Equal chest rise bilateral, no retractions, no wheezing  Heart: Reg rate  Abdomen: soft, mild suprapubic and epigastric tenderness, no peritonitis, super morbidly obese, nondistended  Skin; warm and dry, no cyanosis  Gu: no cva tenderness  Extremities: atraumatic, no focal motor deficits, no open wounds  Psych: No tremor, visual hallucinations    Pathology: N/A    Radiology: I reviewed relevant abdominal imaging from this admission and that available in the EMR including CT abd/pel from 6/6/2022. My assessment is acute sigmoid diverticulitis with microperforation    Assessment:  Andre Claudio is a 20 y.o. male with acute sigmoid diverticulitis with microperforation    Patient Active Problem List   Diagnosis    Other sprain of right middle finger, initial encounter    Tendinitis    Pain of finger of right hand    Perforated abdominal viscus       Plan:  IV antibiotics  Monitor abdominal exam for worsening  Pain control  Clear liquid diet  Time spent reviewing past medical, surgical, social and family history, vitals, nursing assessment and images. Time spent face to face with patient and family counciling and discussing care exceeded 50% of the time of the consult. Additional time spent reviewing images and labs, discussing case with nursing, support staff and other physicians; as well as coordinating care.         Physician Signature: Electronically signed by Dr. Cruz Mobley

## 2022-06-07 NOTE — H&P
[address] [place], [place] [address] Group   Progress Note      AUTHOR: Hema Hackett PA-C PATIENT NAME: Selin Fuentes. DATE: 2022 MRN: [id number], : 2000   Primary Care Physician: CATIE Tavera NP     Subjective: Pain is significantly reduced from 10 out of 10 to 5 out of 10 following the initiation of antibiotics    Nursing: Nothing new to report    PHYSICAL EXAM:  Vitals:  BP (!) 159/81   Pulse 92   Temp 98.2 °F (36.8 °C) (Oral)   Resp 18   Wt (!) 392 lb (177.8 kg)   SpO2 98%   BMI 61.40 kg/m²   GENERAL: No acute distress, Alert and awake, Afebrile, Appears tired and weak otherwise hemodynamically stable at present. HEENT: PERRLA, no icterus. OP clear and no exudates. NECK: Supple  no carotid/ophthalmic bruits, JVD None. RESPIRATORY:  Bilateral equal vesicular breath sound with no wheezing. Lung bases are clear. HEART: No tachycardia at bedside and regular rhythm. Normal S1 and S2, No S3 or S4 is audible. No pulsation, thrills, murmur or friction rubs. ABDOMEN: Soft and nondistended but has lower abdominal/ suprepubic pain and tenderness with out guarding or peritoneal signs. No hepatomegaly or splenomegaly. Rated 5/10. No CVA tenderness on the both sides. Bowel sound is present. EXTREMITIES: All peripheral pulses are present. No calf tenderness or swelling. No pedal edema is present. Darren Rajinder NEUROLOGY: Alert and awake. No new focal neuro deficit. Bilateral Pupil is equal and reactive to light. CN-ii-xii otherwise grossly intact.  Motor and Sensory: Grossly Intact bilaterally with no new focal signs     LABS:  Recent Labs     22  0604   WBC 17.4* 13.3*   RBC 4.83 4.66   HGB 14.2 13.4   HCT 44.0 42.5   MCV 91.1 91.2   MCH 29.4 28.8   MCHC 32.3 31.5*   RDW 13.6 13.7    268   MPV 10.8 10.1     Recent Labs     22  0604   * 136   K 4.2 4.5   CL 96* 100   CO2 25 24   BUN 9 9   CREATININE 0.8 0.7   GLUCOSE 158* 101* CALCIUM 9.4 8.6     Radiology: CT ABDOMEN PELVIS W IV CONTRAST Additional Contrast? Oral    Result Date: 6/7/2022  EXAMINATION: CT OF THE ABDOMEN AND PELVIS WITH CONTRAST 6/6/2022 8:51 pm TECHNIQUE: CT of the abdomen and pelvis was performed with the administration of intravenous contrast. Multiplanar reformatted images are provided for review. Automated exposure control, iterative reconstruction, and/or weight based adjustment of the mA/kV was utilized to reduce the radiation dose to as low as reasonably achievable. COMPARISON: None. HISTORY: ORDERING SYSTEM PROVIDED HISTORY: Epigastric pain, dysuria TECHNOLOGIST PROVIDED HISTORY: Reason for exam:->Epigastric pain, dysuria Additional Contrast?->Oral Decision Support Exception - unselect if not a suspected or confirmed emergency medical condition->Emergency Medical Condition (MA) FINDINGS: Lower Chest:  No infiltrate or effusion in the visible lower chest. Organs: Findings are compatible with diffuse hepatic steatosis. No definite acute abnormality is identified within limitations of beam hardening artifacts and early contrast phase relatively. GI/Bowel: No obstruction. Diverticulosis coli. Suspicion of acute diverticulitis at the sigmoid colon around an anterior diverticulum, seen as hazy/inflammatory changes in the sigmoid mesentery. As seen for example on image 151 there is possible localized perforation versus artifact. There appears to be wall thickening of the sigmoid colon through the area likely representing associated inflammation. Again note limited detailed assessment on exam related to body habitus Pelvis: The inflammatory changes are additionally around the urinary bladder although favored to be secondary. Advise correlation with urinalysis Peritoneum/Retroperitoneum: As described above. Bones/Soft Tissues: Mild spinal degenerative changes.      Findings are compatible with acute sigmoid diverticulitis with possible localized perforation in the sigmoid mesentery. Limited exam.     US GALLBLADDER RUQ    Result Date: 6/6/2022  EXAMINATION: RIGHT UPPER QUADRANT ULTRASOUND 6/6/2022 7:36 pm COMPARISON: None. HISTORY: ORDERING SYSTEM PROVIDED HISTORY: epigastric pain, r/o cholecystitis TECHNOLOGIST PROVIDED HISTORY: Reason for exam:->epigastric pain, r/o cholecystitis What reading provider will be dictating this exam?->CRC FINDINGS: LIVER:  The liver is enlarged with increased echogenicity without evidence of intrahepatic biliary ductal dilatation. BILIARY SYSTEM:  Gallbladder is unremarkable without evidence of pericholecystic fluid, wall thickening or stones. Negative sonographic Acevedo's sign. Common bile duct is within normal limits measuring 3.2 mm. RIGHT KIDNEY: The right kidney is grossly unremarkable without evidence of hydronephrosis. PANCREAS:  Visualized portions of the pancreas are unremarkable. OTHER: No evidence of right upper quadrant ascites. Enlarged fatty liver. No sonographic evidence of acute cholecystitis. ASSESSMENT:    Present on Admission:   Perforation of sigmoid colon due to diverticulitis   UTI (urinary tract infection)   Class 3 severe obesity due to excess calories without serious comorbidity with body mass index (BMI) of 60.0 to 69.9 in adult [facility])    PLAN:  1. Acute signoid diverticultis with micro-perforation:   -Abdomen is obese, no clear peritoneal signs, remains soft, since antibiotics initiated pain is gone from 10/10-5/10  -Continue IV Ceftriaxone and Metronidazole D #1  -Surgery input appreciated as they reviewed relevant abdominal imaging from this admission and that available in the EMR including CT abd/pel from 6/6/2022. Dr. Rodriguez Gist assessment is acute sigmoid diverticulitis with microperforation    2. Morbid Obesity:  -Dietitian evaluation    3. Hepatic steatorrhea:  -Discussed weight loss with the patient who is uninterested    4.    HTN:  -Initiate Amlodipine    Disposition: Home 2-3 days  Code

## 2022-06-07 NOTE — CARE COORDINATION
6/7/2022 1149 CM note: No covid testing. Met with patient for transition of care needs. Pt resides with 2 friends in a townhouse. He is independent with ADLs but does not drive. His friends or mother provide transportation. PCP is NP Silverio Osborne and uses [facility]. Pt plans to return home at [place] and will have transportation, no needs identified.  Claudia MATTHEW

## 2022-06-07 NOTE — ED NOTES
Patient given to [place] on the 4th floor. All patient questions answered to best of my ability. Patient stable, appears to be in NAD, reports 5/10 abdominal pain upon transfer of care to floor.       Jd Ordoñez RN  06/06/22 8595

## 2022-06-07 NOTE — ED NOTES
Pt finished oral contrast            Barney Wright RN  06/06/22 2030       Barney Wright RN  06/06/22 2030

## 2022-06-08 LAB
BASOPHILS ABSOLUTE: 0.06 E9/L (ref 0–0.2)
BASOPHILS RELATIVE PERCENT: 0.5 % (ref 0–2)
EOSINOPHILS ABSOLUTE: 0.13 E9/L (ref 0.05–0.5)
EOSINOPHILS RELATIVE PERCENT: 1 % (ref 0–6)
HCT VFR BLD CALC: 42.6 % (ref 37–54)
HEMOGLOBIN: 13.6 G/DL (ref 12.5–16.5)
IMMATURE GRANULOCYTES #: 0.35 E9/L
IMMATURE GRANULOCYTES %: 2.7 % (ref 0–5)
LYMPHOCYTES ABSOLUTE: 1.89 E9/L (ref 1.5–4)
LYMPHOCYTES RELATIVE PERCENT: 14.4 % (ref 20–42)
MCH RBC QN AUTO: 29.2 PG (ref 26–35)
MCHC RBC AUTO-ENTMCNC: 31.9 % (ref 32–34.5)
MCV RBC AUTO: 91.4 FL (ref 80–99.9)
MONOCYTES ABSOLUTE: 1.02 E9/L (ref 0.1–0.95)
MONOCYTES RELATIVE PERCENT: 7.8 % (ref 2–12)
NEUTROPHILS ABSOLUTE: 9.66 E9/L (ref 1.8–7.3)
NEUTROPHILS RELATIVE PERCENT: 73.6 % (ref 43–80)
PDW BLD-RTO: 13.5 FL (ref 11.5–15)
PLATELET # BLD: 287 E9/L (ref 130–450)
PMV BLD AUTO: 9.8 FL (ref 7–12)
RBC # BLD: 4.66 E12/L (ref 3.8–5.8)
WBC # BLD: 13.1 E9/L (ref 4.5–11.5)

## 2022-06-08 PROCEDURE — 2500000003 HC RX 250 WO HCPCS: Performed by: INTERNAL MEDICINE

## 2022-06-08 PROCEDURE — 6360000002 HC RX W HCPCS: Performed by: STUDENT IN AN ORGANIZED HEALTH CARE EDUCATION/TRAINING PROGRAM

## 2022-06-08 PROCEDURE — 2580000003 HC RX 258: Performed by: STUDENT IN AN ORGANIZED HEALTH CARE EDUCATION/TRAINING PROGRAM

## 2022-06-08 PROCEDURE — 36415 COLL VENOUS BLD VENIPUNCTURE: CPT

## 2022-06-08 PROCEDURE — 85025 COMPLETE CBC W/AUTO DIFF WBC: CPT

## 2022-06-08 PROCEDURE — 1200000000 HC SEMI PRIVATE

## 2022-06-08 PROCEDURE — 99232 SBSQ HOSP IP/OBS MODERATE 35: CPT | Performed by: SURGERY

## 2022-06-08 PROCEDURE — 6370000000 HC RX 637 (ALT 250 FOR IP): Performed by: PHYSICIAN ASSISTANT

## 2022-06-08 PROCEDURE — 99232 SBSQ HOSP IP/OBS MODERATE 35: CPT | Performed by: STUDENT IN AN ORGANIZED HEALTH CARE EDUCATION/TRAINING PROGRAM

## 2022-06-08 PROCEDURE — 2580000003 HC RX 258: Performed by: INTERNAL MEDICINE

## 2022-06-08 RX ORDER — AMLODIPINE BESYLATE 10 MG/1
10 TABLET ORAL DAILY
Status: DISCONTINUED | OUTPATIENT
Start: 2022-06-08 | End: 2022-06-09 | Stop reason: HOSPADM

## 2022-06-08 RX ADMIN — CEFTRIAXONE 2000 MG: 2 INJECTION, POWDER, FOR SOLUTION INTRAMUSCULAR; INTRAVENOUS at 09:25

## 2022-06-08 RX ADMIN — METRONIDAZOLE 500 MG: 500 INJECTION, SOLUTION INTRAVENOUS at 05:18

## 2022-06-08 RX ADMIN — SODIUM CHLORIDE: 9 INJECTION, SOLUTION INTRAVENOUS at 03:25

## 2022-06-08 RX ADMIN — AMLODIPINE BESYLATE 10 MG: 10 TABLET ORAL at 09:25

## 2022-06-08 RX ADMIN — METRONIDAZOLE 500 MG: 500 INJECTION, SOLUTION INTRAVENOUS at 14:37

## 2022-06-08 RX ADMIN — METRONIDAZOLE 500 MG: 500 INJECTION, SOLUTION INTRAVENOUS at 22:20

## 2022-06-08 RX ADMIN — SODIUM CHLORIDE: 9 INJECTION, SOLUTION INTRAVENOUS at 22:18

## 2022-06-08 ASSESSMENT — PAIN SCALES - GENERAL
PAINLEVEL_OUTOF10: 3
PAINLEVEL_OUTOF10: 3

## 2022-06-08 ASSESSMENT — PAIN DESCRIPTION - DIRECTION: RADIATING_TOWARDS: UPPER BACK

## 2022-06-08 ASSESSMENT — PAIN DESCRIPTION - LOCATION: LOCATION: ABDOMEN

## 2022-06-08 ASSESSMENT — PAIN DESCRIPTION - PAIN TYPE: TYPE: ACUTE PAIN

## 2022-06-08 ASSESSMENT — PAIN DESCRIPTION - ORIENTATION: ORIENTATION: LEFT;MID;UPPER

## 2022-06-08 ASSESSMENT — PAIN DESCRIPTION - DESCRIPTORS: DESCRIPTORS: CRAMPING

## 2022-06-08 ASSESSMENT — PAIN DESCRIPTION - ONSET: ONSET: ON-GOING

## 2022-06-08 ASSESSMENT — PAIN DESCRIPTION - FREQUENCY: FREQUENCY: INTERMITTENT

## 2022-06-08 NOTE — PROGRESS NOTES
CO2 25 24   BUN 9 9   CREATININE 0.8 0.7   GLUCOSE 158* 101*   CALCIUM 9.4 8.6     Radiology: CT ABDOMEN PELVIS W IV CONTRAST Additional Contrast? Oral    Result Date: 6/7/2022  EXAMINATION: CT OF THE ABDOMEN AND PELVIS WITH CONTRAST 6/6/2022 8:51 pm TECHNIQUE: CT of the abdomen and pelvis was performed with the administration of intravenous contrast. Multiplanar reformatted images are provided for review. Automated exposure control, iterative reconstruction, and/or weight based adjustment of the mA/kV was utilized to reduce the radiation dose to as low as reasonably achievable. COMPARISON: None. HISTORY: ORDERING SYSTEM PROVIDED HISTORY: Epigastric pain, dysuria TECHNOLOGIST PROVIDED HISTORY: Reason for exam:->Epigastric pain, dysuria Additional Contrast?->Oral Decision Support Exception - unselect if not a suspected or confirmed emergency medical condition->Emergency Medical Condition (MA) FINDINGS: Lower Chest:  No infiltrate or effusion in the visible lower chest. Organs: Findings are compatible with diffuse hepatic steatosis. No definite acute abnormality is identified within limitations of beam hardening artifacts and early contrast phase relatively. GI/Bowel: No obstruction. Diverticulosis coli. Suspicion of acute diverticulitis at the sigmoid colon around an anterior diverticulum, seen as hazy/inflammatory changes in the sigmoid mesentery. As seen for example on image 151 there is possible localized perforation versus artifact. There appears to be wall thickening of the sigmoid colon through the area likely representing associated inflammation. Again note limited detailed assessment on exam related to body habitus Pelvis: The inflammatory changes are additionally around the urinary bladder although favored to be secondary. Advise correlation with urinalysis Peritoneum/Retroperitoneum: As described above. Bones/Soft Tissues: Mild spinal degenerative changes.      Findings are compatible with acute sigmoid diverticulitis with possible localized perforation in the sigmoid mesentery. Limited exam.     US GALLBLADDER RUQ    Result Date: 6/6/2022  EXAMINATION: RIGHT UPPER QUADRANT ULTRASOUND 6/6/2022 7:36 pm COMPARISON: None. HISTORY: ORDERING SYSTEM PROVIDED HISTORY: epigastric pain, r/o cholecystitis TECHNOLOGIST PROVIDED HISTORY: Reason for exam:->epigastric pain, r/o cholecystitis What reading provider will be dictating this exam?->CRC FINDINGS: LIVER:  The liver is enlarged with increased echogenicity without evidence of intrahepatic biliary ductal dilatation. BILIARY SYSTEM:  Gallbladder is unremarkable without evidence of pericholecystic fluid, wall thickening or stones. Negative sonographic Acevedo's sign. Common bile duct is within normal limits measuring 3.2 mm. RIGHT KIDNEY: The right kidney is grossly unremarkable without evidence of hydronephrosis. PANCREAS:  Visualized portions of the pancreas are unremarkable. OTHER: No evidence of right upper quadrant ascites. Enlarged fatty liver. No sonographic evidence of acute cholecystitis. ASSESSMENT:    Present on Admission:   Diverticulitis of sigmoid colon   UTI (urinary tract infection)   Morbid obesity with BMI of 60.0-69.9, adult (HCC)   Perforation of sigmoid colon due to diverticulitis   Primary hypertension    PLAN:  1. Acute signoid diverticultis with micro-perforation:   -Abdomen is obese, no clear peritoneal signs, remains soft, since antibiotics initiated pain is gone from 10/10-5/10  -Continue IV Ceftriaxone and Metronidazole D #1  -Surgery input appreciated as they reviewed relevant abdominal imaging from this admission and that available in the EMR including CT abd/pel from 6/6/2022. Dr. Leo Doherty assessment is acute sigmoid diverticulitis with microperforation    2. Morbid Obesity:  -Dietitian evaluation    3. Hepatic steatorrhea:  -Discussed weight loss with the patient who is uninterested    4.    HTN:  -Initiate Amlodipine    Disposition: Home 2-3 days  Code Status: Full Code  VTE Prophylaxis: Lovenox     SIGNATURE: Benjamin Moseley PA-C PATIENT NAME: Reyes Reynoso. CONTACT #:  [id number] MRN: [id number]     Disclaimer: Portions of this note may have been generated using Dragon voice recognition software. Reasonable efforts were made to correct any dictation errors that resulted due to the programming of this software but some may still be present.

## 2022-06-08 NOTE — PLAN OF CARE
Problem: Pain  Goal: Verbalizes/displays adequate comfort level or baseline comfort level  6/7/2022 2144 by Elizabeth Colon RN  Outcome: Progressing     Problem: Safety - Adult  Goal: Free from fall injury  6/7/2022 2144 by Elizabeth Colon RN  Outcome: Progressing

## 2022-06-08 NOTE — PROGRESS NOTES
GENERAL SURGERY  DAILY PROGRESS NOTE  6/8/2022  Chief Complaint   Patient presents with    Abdominal Pain     left side pain with burning on urination new onset . Pt. denies rectal bleeding        Subjective: Tolerated CLD without issue, denies nausea, emesis. Is passing gas and had a normal bowel movement last night. States his pain is much improved    Objective:  BP (!) 158/79   Pulse 90   Temp 98.2 °F (36.8 °C) (Oral)   Resp 18   Wt (!) 392 lb (177.8 kg)   SpO2 99%   BMI 61.40 kg/m²     GENERAL:  Laying in bed, awake, alert, cooperative, no apparent distress  HEAD: Normocephalic, atraumatic  EYES: No sclera icterus, pupils equal  LUNGS:  No increased work of breathing  CARDIOVASCULAR:  RR  ABDOMEN:  Soft, minimally tender LUQ, non-distended, obese  EXTREMITIES: No edema or swelling  SKIN: Warm and dry    Assessment/Plan:  20 y.o. male with acute sigmoid diverticulitis with microperforation.    - Advance to low fiber diet  - Continue IV abx, will discuss transitioning to oral abx once tolerating regular diet  - Pain/nausea control prn    Electronically signed by Anatoliy Dupree MD on 6/8/2022 at 6:46 AM     General Surgery Progress Note  [facility]    Patient's Name/Date of Birth: Brandon Davis. / 2000    Date: June 8, 2022     Surgeon: Sharath Bella MD    Chief Complaint: diverticulitis    Patient Active Problem List   Diagnosis    Other sprain of right middle finger, initial encounter    Tendinitis    Pain of finger of right hand    Perforated abdominal viscus    Diverticulitis of sigmoid colon    UTI (urinary tract infection)    Morbid obesity with BMI of 60.0-69.9, adult ([facility] [address]    Perforation of sigmoid colon due to diverticulitis    Primary hypertension       Subjective: Pain continues to improve. Tolerating low fiber diet without difficulty. Had bowel movement yesterday.     Objective:  BP (!) 146/83   Pulse 90   Temp 98.4 °F (36.9 °C) (Oral)   Resp 18   Wt (!) 392 lb (177.8 kg)   SpO2 100%   BMI 61.40 kg/m²   Labs:  Recent Labs     06/06/22  1827 06/07/22  0604 06/08/22  0649   WBC 17.4* 13.3* 13.1*   HGB 14.2 13.4 13.6   HCT 44.0 42.5 42.6     Lab Results   Component Value Date    CREATININE 0.7 06/07/2022    BUN 9 06/07/2022     06/07/2022    K 4.5 06/07/2022     06/07/2022    CO2 24 06/07/2022     Recent Labs     06/06/22  1827   LIPASE 18     CBC with Differential:    Lab Results   Component Value Date    WBC 13.1 06/08/2022    RBC 4.66 06/08/2022    HGB 13.6 06/08/2022    HCT 42.6 06/08/2022     06/08/2022    MCV 91.4 06/08/2022    MCH 29.2 06/08/2022    MCHC 31.9 06/08/2022    RDW 13.5 06/08/2022    SEGSPCT 57 11/05/2011    LYMPHOPCT 14.4 06/08/2022    MONOPCT 7.8 06/08/2022    BASOPCT 0.5 06/08/2022    MONOSABS 1.02 06/08/2022    LYMPHSABS 1.89 06/08/2022    EOSABS 0.13 06/08/2022    BASOSABS 0.06 06/08/2022     CMP:    Lab Results   Component Value Date     06/07/2022    K 4.5 06/07/2022     06/07/2022    CO2 24 06/07/2022    BUN 9 06/07/2022    CREATININE 0.7 06/07/2022    GFRAA >60 06/07/2022    LABGLOM >60 06/07/2022    GLUCOSE 101 06/07/2022    GLUCOSE 93 11/05/2011    PROT 6.9 06/07/2022    LABALBU 3.2 06/07/2022    LABALBU 4.9 11/05/2011    CALCIUM 8.6 06/07/2022    BILITOT 0.6 06/07/2022    ALKPHOS 111 06/07/2022    AST 34 06/07/2022    ALT 29 06/07/2022       General appearance:  NAD  Head: NCAT, PERRLA, EOMI, red conjunctiva  Neck: supple, no masses  Lungs: CTAB, equal chest rise bilateral  Heart: Reg rate  Abdomen: soft, nondistended, tender mild lower abdomen, no peritonitis  Skin; no lesions  Gu: no cva tenderness  Extremities: extremities normal, atraumatic, no cyanosis or edema      Assessment/Plan:  Sandra Palmer. is a 20 y.o. male acute diverticulitis with microperforation    Continue diet as tolerated  Oral antibiotics  Pain control as needed  Discharge in the next 24 hours if continues to improve  We discussed outpatient colonoscopy in 6 weeks    Ruma Murry MD  6/8/2022  12:26 PM

## 2022-06-08 NOTE — PROGRESS NOTES
Attending Supervising Physicians Attestation Statement  I saw and evaluated the patient. I discussed the findings and plans with physician assistant and agree as documented in his note     This attestation for SARA Mckeon's note. Subjective:  Patient reports he is continuing to feel improved and tolerating diet.  Denies fever/chills or N/V.      Exam:  General Appearance: alert and oriented x3 in no acute distress, morbidly obese  Skin: warm and dry, no rash or erythema  Head: normocephalic and atraumatic  Eyes: pupils equal, round, extraocular eye movements intact, conjunctivae normal  Neck: supple and non-tender without mass, no cervical lymphadenopathy  Pulmonary/Chest: clear to auscultation  Cardiovascular: normal rate, regular rhythm, normal S1 and S2, no murmurs, rubs, clicks, or gallops  Abdomen: soft, non-tender, non-distended, normal bowel sounds, no masses or organomegaly  Extremities: no cyanosis, clubbing or edema  Neurologic: no cranial nerve deficit, speech normal        A/P:  Acute sigmoid diverticulitis with microperforation   General surgery on, will watch closely and continue abx, ceftriaxone and flagyl  Diet being advanced, if continuing to improve and tolerating regular diet can be discharged tomorrow     HTN  Is on lisinopril at home, was not continued on admission in case surgical intervention needed  Was started on amlodipine yesterday, will watch BP and adjust regimen as needed     Morbid obesity  Patient counseled on healthy lifestyle changes     Hepatic steatosis   Finding on CT scan and RUQ US, likely related to morbid obesity  Patient counseled on weight loss      Electronically signed by Fermin Rodriguez MD on 6/8/22 at 6:56 PM EDT

## 2022-06-09 VITALS
HEART RATE: 92 BPM | OXYGEN SATURATION: 100 % | TEMPERATURE: 98.1 F | SYSTOLIC BLOOD PRESSURE: 152 MMHG | DIASTOLIC BLOOD PRESSURE: 70 MMHG | BODY MASS INDEX: 61.4 KG/M2 | RESPIRATION RATE: 18 BRPM | WEIGHT: 315 LBS

## 2022-06-09 PROCEDURE — APPSS60 APP SPLIT SHARED TIME 46-60 MINUTES: Performed by: PHYSICIAN ASSISTANT

## 2022-06-09 PROCEDURE — 6370000000 HC RX 637 (ALT 250 FOR IP): Performed by: PHYSICIAN ASSISTANT

## 2022-06-09 PROCEDURE — 2500000003 HC RX 250 WO HCPCS: Performed by: INTERNAL MEDICINE

## 2022-06-09 PROCEDURE — 99239 HOSP IP/OBS DSCHRG MGMT >30: CPT | Performed by: INTERNAL MEDICINE

## 2022-06-09 PROCEDURE — 99232 SBSQ HOSP IP/OBS MODERATE 35: CPT | Performed by: SURGERY

## 2022-06-09 RX ORDER — CEFDINIR 300 MG/1
300 CAPSULE ORAL EVERY 12 HOURS SCHEDULED
Qty: 14 CAPSULE | Refills: 0 | Status: SHIPPED | OUTPATIENT
Start: 2022-06-09 | End: 2022-06-16

## 2022-06-09 RX ORDER — AMLODIPINE BESYLATE 10 MG/1
10 TABLET ORAL DAILY
Qty: 30 TABLET | Refills: 3 | Status: SHIPPED | OUTPATIENT
Start: 2022-06-10

## 2022-06-09 RX ORDER — CEFDINIR 300 MG/1
300 CAPSULE ORAL EVERY 12 HOURS SCHEDULED
Status: DISCONTINUED | OUTPATIENT
Start: 2022-06-09 | End: 2022-06-09 | Stop reason: HOSPADM

## 2022-06-09 RX ORDER — CEFDINIR 300 MG/1
300 CAPSULE ORAL 2 TIMES DAILY
Qty: 14 CAPSULE | Refills: 0 | Status: CANCELLED | OUTPATIENT
Start: 2022-06-09 | End: 2022-06-16

## 2022-06-09 RX ORDER — METRONIDAZOLE 500 MG/1
500 TABLET ORAL 2 TIMES DAILY
Qty: 14 TABLET | Refills: 0 | Status: SHIPPED | OUTPATIENT
Start: 2022-06-09 | End: 2022-06-16

## 2022-06-09 RX ORDER — AMLODIPINE BESYLATE 10 MG/1
10 TABLET ORAL DAILY
Qty: 30 TABLET | Refills: 0 | Status: CANCELLED | OUTPATIENT
Start: 2022-06-09

## 2022-06-09 RX ORDER — METRONIDAZOLE 500 MG/1
500 TABLET ORAL EVERY 8 HOURS SCHEDULED
Status: DISCONTINUED | OUTPATIENT
Start: 2022-06-09 | End: 2022-06-09 | Stop reason: HOSPADM

## 2022-06-09 RX ADMIN — METRONIDAZOLE 500 MG: 500 INJECTION, SOLUTION INTRAVENOUS at 05:40

## 2022-06-09 RX ADMIN — AMLODIPINE BESYLATE 10 MG: 10 TABLET ORAL at 08:14

## 2022-06-09 RX ADMIN — CEFDINIR 300 MG: 300 CAPSULE ORAL at 08:13

## 2022-06-09 RX ADMIN — METRONIDAZOLE 500 MG: 500 TABLET ORAL at 14:56

## 2022-06-09 ASSESSMENT — PAIN SCALES - GENERAL: PAINLEVEL_OUTOF10: 0

## 2022-06-09 NOTE — PLAN OF CARE
Problem: Pain  Goal: Verbalizes/displays adequate comfort level or baseline comfort level  6/8/2022 2334 by Elizabeth Colon RN  Outcome: Progressing     Problem: Safety - Adult  Goal: Free from fall injury  6/8/2022 2334 by Elizabeth Colon RN  Outcome: Progressing

## 2022-06-09 NOTE — PROGRESS NOTES
General Surgery Progress Note  [facility]    Patient's Name/Date of Birth: Rony Kathleen. / 2000    Date: June 9, 2022     Surgeon: Pratik Wren MD    Chief Complaint: diverticulitis    Patient Active Problem List   Diagnosis    Other sprain of right middle finger, initial encounter    Tendinitis    Pain of finger of right hand    Perforated abdominal viscus    Diverticulitis of sigmoid colon    UTI (urinary tract infection)    Morbid obesity with BMI of 60.0-69.9, adult ([address]    Perforation of sigmoid colon due to diverticulitis    Primary hypertension       Subjective: No pain this morning  Tolerating low fiber diet without difficulty. Continues having regular bowel movements.  Feeling much better overall    Objective:  BP (!) 152/70   Pulse 92   Temp 98.1 °F (36.7 °C) (Oral)   Resp 18   Wt (!) 392 lb (177.8 kg)   SpO2 100%   BMI 61.40 kg/m²   Labs:  Recent Labs     06/06/22  1827 06/07/22  0604 06/08/22  0649   WBC 17.4* 13.3* 13.1*   HGB 14.2 13.4 13.6   HCT 44.0 42.5 42.6     Lab Results   Component Value Date    CREATININE 0.7 06/07/2022    BUN 9 06/07/2022     06/07/2022    K 4.5 06/07/2022     06/07/2022    CO2 24 06/07/2022     Recent Labs     06/06/22  1827   LIPASE 18     CBC with Differential:    Lab Results   Component Value Date    WBC 13.1 06/08/2022    RBC 4.66 06/08/2022    HGB 13.6 06/08/2022    HCT 42.6 06/08/2022     06/08/2022    MCV 91.4 06/08/2022    MCH 29.2 06/08/2022    MCHC 31.9 06/08/2022    RDW 13.5 06/08/2022    SEGSPCT 57 11/05/2011    LYMPHOPCT 14.4 06/08/2022    MONOPCT 7.8 06/08/2022    BASOPCT 0.5 06/08/2022    MONOSABS 1.02 06/08/2022    LYMPHSABS 1.89 06/08/2022    EOSABS 0.13 06/08/2022    BASOSABS 0.06 06/08/2022     CMP:    Lab Results   Component Value Date     06/07/2022    K 4.5 06/07/2022     06/07/2022    CO2 24 06/07/2022    BUN 9 06/07/2022    CREATININE 0.7 06/07/2022    GFRAA >60 06/07/2022    LABGLOM >60 06/07/2022    GLUCOSE 101 06/07/2022    GLUCOSE 93 11/05/2011    PROT 6.9 06/07/2022    LABALBU 3.2 06/07/2022    LABALBU 4.9 11/05/2011    CALCIUM 8.6 06/07/2022    BILITOT 0.6 06/07/2022    ALKPHOS 111 06/07/2022    AST 34 06/07/2022    ALT 29 06/07/2022       General appearance:  NAD  Head: NCAT, PERRLA, EOMI, red conjunctiva  Neck: supple, no masses  Lungs: CTAB, equal chest rise bilateral  Heart: Reg rate  Abdomen: soft, nondistended, nontender  Skin; no lesions  Gu: no cva tenderness  Extremities: extremities normal, atraumatic, no cyanosis or edema      Assessment/Plan:  Addis Alicea is a 20 y.o. male acute diverticulitis with microperforation    Continue diet as tolerated  Oral antibiotics  Pain control as needed  OK to discharge  We discussed outpatient colonoscopy in 6-8 weeks    Radha Reynoso MD  6/9/2022  10:19 AM     As above.      Asa Bear MD

## 2022-06-09 NOTE — CARE COORDINATION
6/9/2022 1006 CM note:No covid testing. Pt plans to return home at [place] and has transportation, no needs identified.  Claudia MATTHEW

## 2022-06-09 NOTE — PROGRESS NOTES
CLINICAL PHARMACY NOTE: MEDS TO BEDS    Total # of Prescriptions Filled: 3   The following medications were delivered to the patient:  · Metronidazole 500mg  · Cefdinir 300mg  · Amlodipine 10mg    Additional Documentation:

## 2022-06-09 NOTE — DISCHARGE SUMMARY
[facility] Physician Discharge Summary       Isaac Pacheco, [address] [phone]    In 1 month  To see the surgeon in the office to schedule outpatient colonoscopy. CATIE Castillo NP  [facility] [address] [phone]    Schedule an appointment as soon as possible for a visit in 1 week  To follow-up her hospital care and to examine you for resolution of the abdominal pain    Activity level: As tolerated    Diet: ADULT DIET; Regular; Low Fiber   Consider a weight loss/reduced calorie diet as your liver is showing changes consistent with the extra weight you are carrying. Labs: Per your primary care    Condition at discharge: Improving    Dispo: Home    Patient ID:  Tessy Short.  [id number]  20 y.o.  2000    Admit date: 6/6/2022    Discharge date and time:  6/9/2022  9:55 AM    Admission Diagnoses: Principal Problem:    Diverticulitis of sigmoid colon  Active Problems:    UTI (urinary tract infection)    Morbid obesity with BMI of 60.0-69.9, adult ([address]    Perforation of sigmoid colon due to diverticulitis    Primary hypertension  Resolved Problems:    * No resolved hospital problems. *      Discharge Diagnoses: Principal Problem:    Diverticulitis of sigmoid colon  Active Problems:    UTI (urinary tract infection)    Morbid obesity with BMI of 60.0-69.9, adult (HCC)    Perforation of sigmoid colon due to diverticulitis    Primary hypertension  Resolved Problems:    * No resolved hospital problems. *      Consults:  IP CONSULT TO GENERAL SURGERY  IP CONSULT TO GENERAL SURGERY    Procedures: None    Hospital Course:   1.    Acute signoid diverticultis with micro-perforation:     -Abdomen is obese, no clear peritoneal signs, remains soft, since antibiotics initiated pain is gone from 10/10-->5/10-->2/10  -Continue IV Ceftriaxone and Metronidazole now oral Omnicef and Flagyl D #4  -Surgery input appreciated as they reviewed relevant abdominal imaging from this admission and that available in the EMR including CT abd/pel from 6/6/2022. Dr. Lugo Fret sigmoid diverticulitis with microperforation  -Follow-up with Dr. Palmer Rivers in 1 month to plan a colonoscopy for 6 weeks     2. Morbid Obesity:  -Dietitian evaluation     3. Hepatic steatorrhea:  -Discussed weight loss with the patient who is uninterested     4. HTN:  -Initiate Amlodipine and continue lisinopril at discharge because of ongoing hypertension    5. UTI:  -Has been on 3 full days of Rocephin switched over to Graylin Charnley and will follow up with his primary care  -Patient's urgency and painful urination have resolved    Discharge Exam:  GENERAL: No acute distress, Alert and awake, Afebrile, Appears tired and weak otherwise hemodynamically stable at present. HEENT: PERRLA, no icterus. OP clear and no exudates. NECK: Supple  no carotid/ophthalmic bruits, JVD None. RESPIRATORY:  Bilateral equal vesicular breath sound with no wheezing. Lung bases are clear. HEART: No tachycardia at bedside and regular rhythm. Normal S1 and S2, No S3 or S4 is audible. No pulsation, thrills, murmur or friction rubs. ABDOMEN: Soft and nondistended but has minimal lower abdominal/ suprapubic pain and tenderness with out guarding or peritoneal signs. No hepatomegaly or splenomegaly. Rated 2/10. No CVA tenderness on the both sides. Bowel sound is present. EXTREMITIES: All peripheral pulses are present. No calf tenderness or swelling. No pedal edema is present. Abundio Uriarte NEUROLOGY: Alert and awake. No new focal neuro deficit. Bilateral Pupil is equal and reactive to light. CN-ii-xii otherwise grossly intact.  Motor and Sensory: Grossly Intact bilaterally with no new focal signs     Vitals:    06/08/22 0915 06/08/22 1430 06/08/22 1908 06/09/22 0800   BP: (!) 146/83 (!) 154/79 (!) 157/80 (!) 152/70   Pulse: 90 93 95 92   Resp: 18  18 18   Temp: 98.4 °F sigmoid colon around an anterior diverticulum, seen as hazy/inflammatory changes in the sigmoid mesentery. As seen for example on image 151 there is possible localized perforation versus artifact. There appears to be wall thickening of the sigmoid colon through the area likely representing associated inflammation. Again note limited detailed assessment on exam related to body habitus Pelvis: The inflammatory changes are additionally around the urinary bladder although favored to be secondary. Advise correlation with urinalysis Peritoneum/Retroperitoneum: As described above. Bones/Soft Tissues: Mild spinal degenerative changes. Findings are compatible with acute sigmoid diverticulitis with possible localized perforation in the sigmoid mesentery. Limited exam.     US GALLBLADDER RUQ    Result Date: 6/6/2022  EXAMINATION: RIGHT UPPER QUADRANT ULTRASOUND 6/6/2022 7:36 pm COMPARISON: None. HISTORY: ORDERING SYSTEM PROVIDED HISTORY: epigastric pain, r/o cholecystitis TECHNOLOGIST PROVIDED HISTORY: Reason for exam:->epigastric pain, r/o cholecystitis What reading provider will be dictating this exam?->CRC FINDINGS: LIVER:  The liver is enlarged with increased echogenicity without evidence of intrahepatic biliary ductal dilatation. BILIARY SYSTEM:  Gallbladder is unremarkable without evidence of pericholecystic fluid, wall thickening or stones. Negative sonographic Acevedo's sign. Common bile duct is within normal limits measuring 3.2 mm. RIGHT KIDNEY: The right kidney is grossly unremarkable without evidence of hydronephrosis. PANCREAS:  Visualized portions of the pancreas are unremarkable. OTHER: No evidence of right upper quadrant ascites. Enlarged fatty liver. No sonographic evidence of acute cholecystitis.      Patient Instructions:   Current Discharge Medication List      START taking these medications    Details   metroNIDAZOLE (FLAGYL) 500 MG tablet Take 1 tablet by mouth 2 times daily for 7 days  Qty: 14 tablet, Refills: 0      amLODIPine (NORVASC) 10 MG tablet Take 1 tablet by mouth daily  Qty: 30 tablet, Refills: 3      cefdinir (OMNICEF) 300 MG capsule Take 1 capsule by mouth every 12 hours for 7 days  Qty: 14 capsule, Refills: 0         CONTINUE these medications which have NOT CHANGED    Details   albuterol sulfate HFA (VENTOLIN HFA) 108 (90 Base) MCG/ACT inhaler Inhale 2 puffs into the lungs every 6 hours as needed for Wheezing or Shortness of Breath      lisinopril (PRINIVIL;ZESTRIL) 10 MG tablet Take 10 mg by mouth daily      topiramate (TOPAMAX) 50 mg tablet Take 50 mg by mouth daily      cetirizine (ZYRTEC) 10 MG tablet Take 10 mg by mouth daily       vitamin D (ERGOCALCIFEROL) 1.25 MG (93916 UT) CAPS capsule Take 50,000 Units by mouth once a week Monday      famotidine (PEPCID) 40 MG tablet Take 40 mg by mouth daily       guanFACINE HCl ER (INTUNIV) 3 MG TB24 tablet Take 3 mg by mouth daily       pimozide (ORAP) 2 MG tablet Take 2 mg by mouth nightly       sertraline (ZOLOFT) 25 MG tablet Take 25 mg by mouth daily          STOP taking these medications       ibuprofen (ADVIL;MOTRIN) 600 MG tablet Comments:   Reason for Stopping:             Note that greater than 30 minutes was spent in preparing discharge papers, discussing discharge with patient, medication review, etc.    NOTE: This report was transcribed using voice recognition software. Every effort was made to ensure accuracy; however, inadvertent computerized transcription errors may be present.      Signed:  Electronically signed by Anum Paz, Ph.D. on 6/9/2022 at @NOW

## 2022-06-10 ENCOUNTER — TELEPHONE (OUTPATIENT)
Dept: SURGERY | Age: 22
End: 2022-06-10

## 2022-06-10 NOTE — TELEPHONE ENCOUNTER
Per Dr. Austin, patient is scheduled for Colonoscopy possible biopsy or polypectomy at [address] [place] [address] on 22. Surgery scheduled via iqueue, surgeon's calendar updated. Dr. Austin to enter orders. Follow up appointment scheduled. Golytely bowel prep instructions emailed and discussed. Electronically signed by Cosmo Serna RN on 6/10/2022 at 11:47 AM    Prior Authorization Form:      DEMOGRAPHICS:                     Patient Name:  Reyes Reynoso. Patient :  2000            Insurance:  Payor: AMANDA / Plan: Honey Hanson / Product Type: *No Product type* /   Insurance ID Number:    Payor/Plan Subscr  Sex Relation Sub. Ins. ID Effective Group Num   1. [id number] M [birth year]1968 Male Child [id number]                                   [address]   2.  Francia Daymagan - Lilianajulian Trinity* 2000 Male Self [id number]                                   [address]         DIAGNOSIS & PROCEDURE:                       Procedure/Operation: Colonoscopy possible biopsy or polypectomy           CPT Code: 60965    Diagnosis:  Diverticulitis    ICD10 Code: K57.32    Location:  [address] [place] [address]    Surgeon:  Jennifer Colon INFORMATION:                          Date: 22    Time: TBD              Anesthesia:  MAC/TIVA                                                       Status:  Outpatient        Special Comments:         Electronically signed by Cosmo Serna RN on 6/10/2022 at 11:47 AM

## 2022-06-13 ENCOUNTER — APPOINTMENT (OUTPATIENT)
Dept: CT IMAGING | Age: 22
End: 2022-06-13
Payer: COMMERCIAL

## 2022-06-13 ENCOUNTER — HOSPITAL ENCOUNTER (EMERGENCY)
Age: 22
Discharge: HOME | End: 2022-06-14
Attending: EMERGENCY MEDICINE
Payer: COMMERCIAL

## 2022-06-13 DIAGNOSIS — R10.12 LEFT UPPER QUADRANT ABDOMINAL PAIN: Primary | ICD-10-CM

## 2022-06-13 LAB
ALBUMIN SERPL-MCNC: 3.7 G/DL (ref 3.5–5.2)
ALP BLD-CCNC: 106 U/L (ref 40–129)
ALT SERPL-CCNC: 44 U/L (ref 0–40)
ANION GAP SERPL CALCULATED.3IONS-SCNC: 12 MMOL/L (ref 7–16)
AST SERPL-CCNC: 41 U/L (ref 0–39)
BACTERIA: ABNORMAL /HPF
BASOPHILS ABSOLUTE: 0.06 E9/L (ref 0–0.2)
BASOPHILS RELATIVE PERCENT: 0.5 % (ref 0–2)
BILIRUB SERPL-MCNC: <0.2 MG/DL (ref 0–1.2)
BILIRUBIN URINE: NEGATIVE
BLOOD, URINE: NEGATIVE
BUN BLDV-MCNC: 11 MG/DL (ref 6–20)
CALCIUM SERPL-MCNC: 8.9 MG/DL (ref 8.6–10.2)
CHLORIDE BLD-SCNC: 103 MMOL/L (ref 98–107)
CLARITY: ABNORMAL
CO2: 24 MMOL/L (ref 22–29)
COLOR: YELLOW
CREAT SERPL-MCNC: 0.8 MG/DL (ref 0.7–1.2)
CRYSTALS, UA: ABNORMAL /HPF
EOSINOPHILS ABSOLUTE: 0.11 E9/L (ref 0.05–0.5)
EOSINOPHILS RELATIVE PERCENT: 0.9 % (ref 0–6)
EPITHELIAL CELLS, UA: ABNORMAL /HPF
GFR AFRICAN AMERICAN: >60
GFR NON-AFRICAN AMERICAN: >60 ML/MIN/1.73
GLUCOSE BLD-MCNC: 126 MG/DL (ref 74–99)
GLUCOSE URINE: NEGATIVE MG/DL
HCT VFR BLD CALC: 45.3 % (ref 37–54)
HEMOGLOBIN: 14.8 G/DL (ref 12.5–16.5)
IMMATURE GRANULOCYTES #: 0.23 E9/L
IMMATURE GRANULOCYTES %: 1.8 % (ref 0–5)
KETONES, URINE: ABNORMAL MG/DL
LACTIC ACID: 1.8 MMOL/L (ref 0.5–2.2)
LEUKOCYTE ESTERASE, URINE: NEGATIVE
LIPASE: 24 U/L (ref 13–60)
LYMPHOCYTES ABSOLUTE: 2.35 E9/L (ref 1.5–4)
LYMPHOCYTES RELATIVE PERCENT: 18.5 % (ref 20–42)
MCH RBC QN AUTO: 29.1 PG (ref 26–35)
MCHC RBC AUTO-ENTMCNC: 32.7 % (ref 32–34.5)
MCV RBC AUTO: 89 FL (ref 80–99.9)
MONOCYTES ABSOLUTE: 0.81 E9/L (ref 0.1–0.95)
MONOCYTES RELATIVE PERCENT: 6.4 % (ref 2–12)
NEUTROPHILS ABSOLUTE: 9.16 E9/L (ref 1.8–7.3)
NEUTROPHILS RELATIVE PERCENT: 71.9 % (ref 43–80)
NITRITE, URINE: NEGATIVE
PDW BLD-RTO: 13.2 FL (ref 11.5–15)
PH UA: 5 (ref 5–9)
PLATELET # BLD: 377 E9/L (ref 130–450)
PMV BLD AUTO: 9.5 FL (ref 7–12)
POTASSIUM REFLEX MAGNESIUM: 3.9 MMOL/L (ref 3.5–5)
PROTEIN UA: NEGATIVE MG/DL
RBC # BLD: 5.09 E12/L (ref 3.8–5.8)
RBC UA: ABNORMAL /HPF (ref 0–2)
SODIUM BLD-SCNC: 139 MMOL/L (ref 132–146)
SPECIFIC GRAVITY UA: >=1.03 (ref 1–1.03)
TOTAL PROTEIN: 7.2 G/DL (ref 6.4–8.3)
UROBILINOGEN, URINE: 0.2 E.U./DL
WBC # BLD: 12.7 E9/L (ref 4.5–11.5)
WBC UA: ABNORMAL /HPF (ref 0–5)

## 2022-06-13 PROCEDURE — 80053 COMPREHEN METABOLIC PANEL: CPT

## 2022-06-13 PROCEDURE — 96365 THER/PROPH/DIAG IV INF INIT: CPT

## 2022-06-13 PROCEDURE — 74177 CT ABD & PELVIS W/CONTRAST: CPT

## 2022-06-13 PROCEDURE — 6360000004 HC RX CONTRAST MEDICATION: Performed by: RADIOLOGY

## 2022-06-13 PROCEDURE — 83690 ASSAY OF LIPASE: CPT

## 2022-06-13 PROCEDURE — 83605 ASSAY OF LACTIC ACID: CPT

## 2022-06-13 PROCEDURE — 81001 URINALYSIS AUTO W/SCOPE: CPT

## 2022-06-13 PROCEDURE — 2500000003 HC RX 250 WO HCPCS: Performed by: EMERGENCY MEDICINE

## 2022-06-13 PROCEDURE — 96375 TX/PRO/DX INJ NEW DRUG ADDON: CPT

## 2022-06-13 PROCEDURE — 85025 COMPLETE CBC W/AUTO DIFF WBC: CPT

## 2022-06-13 PROCEDURE — 2580000003 HC RX 258: Performed by: EMERGENCY MEDICINE

## 2022-06-13 PROCEDURE — 6360000002 HC RX W HCPCS: Performed by: EMERGENCY MEDICINE

## 2022-06-13 PROCEDURE — 96372 THER/PROPH/DIAG INJ SC/IM: CPT

## 2022-06-13 PROCEDURE — 99285 EMERGENCY DEPT VISIT HI MDM: CPT

## 2022-06-13 RX ORDER — MORPHINE SULFATE 5 MG/ML
5 INJECTION, SOLUTION INTRAMUSCULAR; INTRAVENOUS ONCE
Status: COMPLETED | OUTPATIENT
Start: 2022-06-13 | End: 2022-06-13

## 2022-06-13 RX ORDER — METRONIDAZOLE 500 MG/100ML
500 INJECTION, SOLUTION INTRAVENOUS ONCE
Status: COMPLETED | OUTPATIENT
Start: 2022-06-13 | End: 2022-06-13

## 2022-06-13 RX ORDER — 0.9 % SODIUM CHLORIDE 0.9 %
1000 INTRAVENOUS SOLUTION INTRAVENOUS ONCE
Status: COMPLETED | OUTPATIENT
Start: 2022-06-13 | End: 2022-06-14

## 2022-06-13 RX ORDER — ONDANSETRON 2 MG/ML
4 INJECTION INTRAMUSCULAR; INTRAVENOUS ONCE
Status: COMPLETED | OUTPATIENT
Start: 2022-06-13 | End: 2022-06-13

## 2022-06-13 RX ADMIN — SODIUM CHLORIDE 1000 ML: 9 INJECTION, SOLUTION INTRAVENOUS at 20:41

## 2022-06-13 RX ADMIN — MORPHINE SULFATE 5 MG: 5 INJECTION, SOLUTION INTRAMUSCULAR; INTRAVENOUS at 20:45

## 2022-06-13 RX ADMIN — METRONIDAZOLE 500 MG: 500 INJECTION, SOLUTION INTRAVENOUS at 21:01

## 2022-06-13 RX ADMIN — IOPAMIDOL 50 ML: 755 INJECTION, SOLUTION INTRAVENOUS at 22:56

## 2022-06-13 RX ADMIN — IOHEXOL 50 ML: 240 INJECTION, SOLUTION INTRATHECAL; INTRAVASCULAR; INTRAVENOUS; ORAL at 22:56

## 2022-06-13 RX ADMIN — ONDANSETRON 4 MG: 2 INJECTION INTRAMUSCULAR; INTRAVENOUS at 20:45

## 2022-06-13 RX ADMIN — CEFTRIAXONE 2000 MG: 2 INJECTION, POWDER, FOR SOLUTION INTRAMUSCULAR; INTRAVENOUS at 20:52

## 2022-06-13 ASSESSMENT — PAIN - FUNCTIONAL ASSESSMENT: PAIN_FUNCTIONAL_ASSESSMENT: 0-10

## 2022-06-13 ASSESSMENT — ENCOUNTER SYMPTOMS
SORE THROAT: 0
COUGH: 0
SHORTNESS OF BREATH: 0
WHEEZING: 0
NAUSEA: 1
ABDOMINAL PAIN: 1
VOMITING: 1
DIARRHEA: 0
CHEST TIGHTNESS: 0
BACK PAIN: 0

## 2022-06-13 ASSESSMENT — PAIN DESCRIPTION - LOCATION
LOCATION: ABDOMEN

## 2022-06-13 ASSESSMENT — PAIN SCALES - GENERAL
PAINLEVEL_OUTOF10: 6
PAINLEVEL_OUTOF10: 6
PAINLEVEL_OUTOF10: 10
PAINLEVEL_OUTOF10: 10

## 2022-06-13 ASSESSMENT — PAIN DESCRIPTION - PAIN TYPE: TYPE: ACUTE PAIN;CHRONIC PAIN

## 2022-06-13 ASSESSMENT — PAIN DESCRIPTION - DESCRIPTORS: DESCRIPTORS: BURNING;CRAMPING

## 2022-06-13 ASSESSMENT — PAIN DESCRIPTION - ORIENTATION
ORIENTATION: LEFT;UPPER
ORIENTATION: UPPER;RIGHT;LEFT
ORIENTATION: LEFT;UPPER
ORIENTATION: LEFT;UPPER

## 2022-06-13 NOTE — ED NOTES
Department of Emergency Medicine  FIRST PROVIDER TRIAGE NOTE             Independent MLP           6/13/22  6:02 PM EDT    Date of Encounter: 6/13/22   MRN: [id number]      HPI: Dale Callahan. is a 20 y.o. male who presents to the ED for Abdominal Pain (ADMITTED LAST WEEK FOR DIVERTICULITIS/ NO FOLLOWUP AT THIS TIME/ VOMITING/ DENEIS DIARRHEA)     Pt reports the pain has returned and is worse and he is now having vomiting. ROS: Negative for fever or urinary complaints. PE: Gen Appearance/Constitutional: alert  Musculoskeletal: moves all extremities x 4     Initial Plan of Care: All treatment areas with department are currently occupied. Plan to order/Initiate the following while awaiting opening in ED: labs  .   Initiate Treatment-Testing, Proceed toTreatment Area When Bed Available for ED Attending/MLP to Continue Care    Electronically signed by Alejandra Gayle PA-C   DD: 6/13/22         Alejandra Gayle PA-C  06/13/22 5257

## 2022-06-13 NOTE — ED NOTES
This RN attempted IV and was unsuccessful, will ask different RN to attempt US IV at this time.       Olga Lidia CoulterChan [facility]  06/13/22 1952

## 2022-06-13 NOTE — ED PROVIDER NOTES
heart sounds. No murmur heard. Pulmonary:      Effort: Pulmonary effort is normal. No respiratory distress. Breath sounds: Normal breath sounds. No stridor, decreased air movement or transmitted upper airway sounds. No decreased breath sounds, wheezing, rhonchi or rales. Chest:      Chest wall: No tenderness. Abdominal:      General: Bowel sounds are normal. There is no distension. Palpations: Abdomen is soft. Tenderness: There is abdominal tenderness in the left upper quadrant. There is no right CVA tenderness, left CVA tenderness, guarding or rebound. Comments: Abdomen soft with mild/moderate left mid to left upper abdominal tenderness, patient states that is where his pain was during his diverticulitis admission. The rest of the abdomen is soft and nontender. No pannus erythema or cellulitis on exam.  No jaundice or icterus. No CVA tenderness. Musculoskeletal:         General: No swelling, tenderness, deformity or signs of injury. Cervical back: Normal range of motion and neck supple. Right lower leg: No edema. Left lower leg: No edema. Comments: Arms and legs are neurovascular intact. No unilateral leg swelling, no pitting pretibial edema. Skin:     General: Skin is warm and dry. Coloration: Skin is not cyanotic, jaundiced, mottled or pale. Findings: No erythema or rash. Neurological:      General: No focal deficit present. Mental Status: He is alert and oriented to person, place, and time. GCS: GCS eye subscore is 4. GCS verbal subscore is 5. GCS motor subscore is 6. Cranial Nerves: Cranial nerves are intact. No cranial nerve deficit. Sensory: Sensation is intact. Motor: Motor function is intact. Coordination: Coordination is intact.  Coordination normal.          Procedures     MDM     ED Course as of 06/29/22 1211   Mon Jun 13, 202213, 2022 2119 9:19 PM EDT  I have signed this patient out to the oncoming physician,  Armand. I have discussed the patient's initial exam, treatment and plan of care with the on coming physician. CT and dispo pending  [facility]   [NC]      ED Course User Index  [NC] Erika Milner DO        ED Course as of 06/29/22 1211   Mon Jun 13, 2022 2119 9:19 PM EDT  I have signed this patient out to the oncoming physician, Dr. Winston Coyle. I have discussed the patient's initial exam, treatment and plan of care with the on coming physician. CT and dispo pending  [facility]   [NC]      ED Course User Index  [NC] Erika Milner DO       --------------------------------------------- PAST HISTORY ---------------------------------------------  Past Medical History:  has a past medical history of Arthritis, Asperger syndrome, Asthma, Obesity, and Sleep apnea. Past Surgical History:  has a past surgical history that includes Tonsillectomy; Adenoidectomy; and Himrod tooth extraction. Social History:  reports that he has never smoked. He has never used smokeless tobacco. He reports current alcohol use of about 1.0 standard drink of alcohol per week. He reports that he does not use drugs. Family History: family history is not on file. The patients home medications have been reviewed. Allergies: Patient has no known allergies.     -------------------------------------------------- RESULTS -------------------------------------------------  Labs:  Results for orders placed or performed during the hospital encounter of 06/13/22   CBC with Auto Differential   Result Value Ref Range    WBC 12.7 (H) 4.5 - 11.5 E9/L    RBC 5.09 3.80 - 5.80 E12/L    Hemoglobin 14.8 12.5 - 16.5 g/dL    Hematocrit 45.3 37.0 - 54.0 %    MCV 89.0 80.0 - 99.9 fL    MCH 29.1 26.0 - 35.0 pg    MCHC 32.7 32.0 - 34.5 %    RDW 13.2 11.5 - 15.0 fL    Platelets 977 796 - 236 E9/L    MPV 9.5 7.0 - 12.0 fL    Neutrophils % 71.9 43.0 - 80.0 %    Immature Granulocytes % 1.8 0.0 - 5.0 %    Lymphocytes % 18.5 (L) 20.0 - 42.0 % Monocytes % 6.4 2.0 - 12.0 %    Eosinophils % 0.9 0.0 - 6.0 %    Basophils % 0.5 0.0 - 2.0 %    Neutrophils Absolute 9.16 (H) 1.80 - 7.30 E9/L    Immature Granulocytes # 0.23 E9/L    Lymphocytes Absolute 2.35 1.50 - 4.00 E9/L    Monocytes Absolute 0.81 0.10 - 0.95 E9/L    Eosinophils Absolute 0.11 0.05 - 0.50 E9/L    Basophils Absolute 0.06 0.00 - 0.20 E9/L   Comprehensive Metabolic Panel w/ Reflex to MG   Result Value Ref Range    Sodium 139 132 - 146 mmol/L    Potassium reflex Magnesium 3.9 3.5 - 5.0 mmol/L    Chloride 103 98 - 107 mmol/L    CO2 24 22 - 29 mmol/L    Anion Gap 12 7 - 16 mmol/L    Glucose 126 (H) 74 - 99 mg/dL    BUN 11 6 - 20 mg/dL    CREATININE 0.8 0.7 - 1.2 mg/dL    GFR Non-African American >60 >=60 mL/min/1.73    GFR African American >60     Calcium 8.9 8.6 - 10.2 mg/dL    Total Protein 7.2 6.4 - 8.3 g/dL    Albumin 3.7 3.5 - 5.2 g/dL    Total Bilirubin <0.2 0.0 - 1.2 mg/dL    Alkaline Phosphatase 106 40 - 129 U/L    ALT 44 (H) 0 - 40 U/L    AST 41 (H) 0 - 39 U/L   Lactic Acid   Result Value Ref Range    Lactic Acid 1.8 0.5 - 2.2 mmol/L   Lipase   Result Value Ref Range    Lipase 24 13 - 60 U/L   Urinalysis with Microscopic   Result Value Ref Range    Color, UA Yellow Straw/Yellow    Clarity, UA SLCLOUDY Clear    Glucose, Ur Negative Negative mg/dL    Bilirubin Urine Negative Negative    Ketones, Urine TRACE (A) Negative mg/dL    Specific Gravity, UA >=1.030 1.005 - 1.030    Blood, Urine Negative Negative    pH, UA 5.0 5.0 - 9.0    Protein, UA Negative Negative mg/dL    Urobilinogen, Urine 0.2 <2.0 E.U./dL    Nitrite, Urine Negative Negative    Leukocyte Esterase, Urine Negative Negative    WBC, UA 0-1 0 - 5 /HPF    RBC, UA 0-1 0 - 2 /HPF    Epithelial Cells, UA FEW /HPF    Bacteria, UA NONE SEEN None Seen /HPF    Crystals, UA Few (A) None Seen /HPF       Radiology:  CT ABDOMEN PELVIS W IV CONTRAST Additional Contrast? Oral   Final Result   1.  Diverticulosis without signs of diverticulitis 2. Hepatomegaly   3. Dense appearance of the liver suggest steatosis             ------------------------- NURSING NOTES AND VITALS REVIEWED ---------------------------  Date / Time Roomed:  6/13/2022  7:04 PM  ED Bed Assignment:  20/20    The nursing notes within the ED encounter and vital signs as below have been reviewed. /86   Pulse 86   Temp 98.2 °F (36.8 °C) (Oral)   Resp 16   Ht 5' 7\" (1.702 m)   Wt (!) 390 lb (176.9 kg)   SpO2 96%   BMI 61.08 kg/m²   Oxygen Saturation Interpretation: Normal          Discharge Medication List as of 6/14/2022 12:53 AM          Diagnosis:  1. Left upper quadrant abdominal pain        Disposition:  Patient's disposition: Discharge to home  Patient's condition is stable.          Sam Olmstead, DO  06/29/22 1212

## 2022-06-14 VITALS
HEIGHT: 67 IN | HEART RATE: 86 BPM | SYSTOLIC BLOOD PRESSURE: 139 MMHG | RESPIRATION RATE: 16 BRPM | WEIGHT: 315 LBS | OXYGEN SATURATION: 96 % | TEMPERATURE: 98.2 F | DIASTOLIC BLOOD PRESSURE: 86 MMHG | BODY MASS INDEX: 49.44 KG/M2

## 2022-06-14 PROCEDURE — 6360000002 HC RX W HCPCS: Performed by: EMERGENCY MEDICINE

## 2022-06-14 PROCEDURE — 6370000000 HC RX 637 (ALT 250 FOR IP): Performed by: EMERGENCY MEDICINE

## 2022-06-14 RX ORDER — PROMETHAZINE HYDROCHLORIDE 25 MG/ML
25 INJECTION, SOLUTION INTRAMUSCULAR; INTRAVENOUS ONCE
Status: COMPLETED | OUTPATIENT
Start: 2022-06-14 | End: 2022-06-14

## 2022-06-14 RX ADMIN — ALUMINUM HYDROXIDE, MAGNESIUM HYDROXIDE, AND SIMETHICONE: 200; 200; 20 SUSPENSION ORAL at 00:46

## 2022-06-14 RX ADMIN — PROMETHAZINE HYDROCHLORIDE 25 MG: 25 INJECTION INTRAMUSCULAR; INTRAVENOUS at 00:48

## 2022-06-14 NOTE — ED NOTES
This RN made CT aware of pt finishing oral contrast approximately 25 mins ago and will notify this RN when ready.       Janny Gonzalez, [facility]  06/13/22 6326

## 2022-06-14 NOTE — ED NOTES
Etta Britt currently at bedside attempting US IV at this time.       Nadine Bosworth, RN  06/13/22 2010

## 2022-06-14 NOTE — ED NOTES
This RN notified, Alem Krishnamurthy RN to attempt IV at this time.       Charlotte Torrez, [facility]  06/13/22 2000

## 2022-06-14 NOTE — ED NOTES
Jorge Macdonald RN attempted US IV twice and was unsuccessful, will notify provider.       Janny Gonzalez, [facility]  06/13/22 2026

## 2022-06-14 NOTE — ED PROVIDER NOTES
2100.  I have assumed care of this patient from the departing physician, Dr. Lj Coyle.  I have discussed the initial history and exam, diagnostics and treatment plan. I have introduced myself to the patient and answered all questions to this point. ED Course as of 06/17/22 1212   Mon Jun 13, 2022 2119 9:19 PM EDT  I have signed this patient out to the oncoming physician, Dr. Jazmyn Torres. I have discussed the patient's initial exam, treatment and plan of care with the on coming physician. CT and dispo pending  Rell Sexton   [NC]      ED Course User Index  [NC] Abbey Chou, DO       --------------------------------------------- PAST HISTORY ---------------------------------------------  Past Medical History:  has a past medical history of Arthritis, Asperger syndrome, Asthma, Obesity, and Sleep apnea. Past Surgical History:  has a past surgical history that includes Tonsillectomy; Adenoidectomy; and Dulce tooth extraction. Social History:  reports that he has never smoked. He has never used smokeless tobacco. He reports current alcohol use of about 1.0 standard drink of alcohol per week. He reports that he does not use drugs. Family History: family history is not on file. The patients home medications have been reviewed. Allergies: Patient has no known allergies.     -------------------------------------------------- RESULTS -------------------------------------------------  Labs:  Results for orders placed or performed during the hospital encounter of 06/13/22   CBC with Auto Differential   Result Value Ref Range    WBC 12.7 (H) 4.5 - 11.5 E9/L    RBC 5.09 3.80 - 5.80 E12/L    Hemoglobin 14.8 12.5 - 16.5 g/dL    Hematocrit 45.3 37.0 - 54.0 %    MCV 89.0 80.0 - 99.9 fL    MCH 29.1 26.0 - 35.0 pg    MCHC 32.7 32.0 - 34.5 %    RDW 13.2 11.5 - 15.0 fL    Platelets 959 938 - 167 E9/L    MPV 9.5 7.0 - 12.0 fL    Neutrophils % 71.9 43.0 - 80.0 %    Immature Granulocytes % 1.8 0.0 - 5.0 % Lymphocytes % 18.5 (L) 20.0 - 42.0 %    Monocytes % 6.4 2.0 - 12.0 %    Eosinophils % 0.9 0.0 - 6.0 %    Basophils % 0.5 0.0 - 2.0 %    Neutrophils Absolute 9.16 (H) 1.80 - 7.30 E9/L    Immature Granulocytes # 0.23 E9/L    Lymphocytes Absolute 2.35 1.50 - 4.00 E9/L    Monocytes Absolute 0.81 0.10 - 0.95 E9/L    Eosinophils Absolute 0.11 0.05 - 0.50 E9/L    Basophils Absolute 0.06 0.00 - 0.20 E9/L   Comprehensive Metabolic Panel w/ Reflex to MG   Result Value Ref Range    Sodium 139 132 - 146 mmol/L    Potassium reflex Magnesium 3.9 3.5 - 5.0 mmol/L    Chloride 103 98 - 107 mmol/L    CO2 24 22 - 29 mmol/L    Anion Gap 12 7 - 16 mmol/L    Glucose 126 (H) 74 - 99 mg/dL    BUN 11 6 - 20 mg/dL    CREATININE 0.8 0.7 - 1.2 mg/dL    GFR Non-African American >60 >=60 mL/min/1.73    GFR African American >60     Calcium 8.9 8.6 - 10.2 mg/dL    Total Protein 7.2 6.4 - 8.3 g/dL    Albumin 3.7 3.5 - 5.2 g/dL    Total Bilirubin <0.2 0.0 - 1.2 mg/dL    Alkaline Phosphatase 106 40 - 129 U/L    ALT 44 (H) 0 - 40 U/L    AST 41 (H) 0 - 39 U/L   Lactic Acid   Result Value Ref Range    Lactic Acid 1.8 0.5 - 2.2 mmol/L   Lipase   Result Value Ref Range    Lipase 24 13 - 60 U/L   Urinalysis with Microscopic   Result Value Ref Range    Color, UA Yellow Straw/Yellow    Clarity, UA SLCLOUDY Clear    Glucose, Ur Negative Negative mg/dL    Bilirubin Urine Negative Negative    Ketones, Urine TRACE (A) Negative mg/dL    Specific Gravity, UA >=1.030 1.005 - 1.030    Blood, Urine Negative Negative    pH, UA 5.0 5.0 - 9.0    Protein, UA Negative Negative mg/dL    Urobilinogen, Urine 0.2 <2.0 E.U./dL    Nitrite, Urine Negative Negative    Leukocyte Esterase, Urine Negative Negative    WBC, UA 0-1 0 - 5 /HPF    RBC, UA 0-1 0 - 2 /HPF    Epithelial Cells, UA FEW /HPF    Bacteria, UA NONE SEEN None Seen /HPF    Crystals, UA Few (A) None Seen /HPF       Radiology:  CT ABDOMEN PELVIS W IV CONTRAST Additional Contrast? Oral   Final Result   1. Diverticulosis without signs of diverticulitis   2. Hepatomegaly   3. Dense appearance of the liver suggest steatosis             ------------------------- NURSING NOTES AND VITALS REVIEWED ---------------------------  Date / Time Roomed:  6/13/2022  7:04 PM  ED Bed Assignment:  20/20    The nursing notes within the ED encounter and vital signs as below have been reviewed. /86   Pulse 86   Temp 98.2 °F (36.8 °C) (Oral)   Resp 16   Ht 5' 7\" (1.702 m)   Wt (!) 390 lb (176.9 kg)   SpO2 96%   BMI 61.08 kg/m²   Oxygen Saturation Interpretation: Normal      ------------------------------------------ PROGRESS NOTES ------------------------------------------  I have spoken with the patient and discussed todays results, in addition to providing specific details for the plan of care and counseling regarding the diagnosis and prognosis. Their questions are answered at this time and they are agreeable with the plan. I discussed at length with them reasons for immediate return here for re evaluation. They will followup with primary care by calling their office tomorrow. --------------------------------- ADDITIONAL PROVIDER NOTES ---------------------------------  At this time the patient is without objective evidence of an acute process requiring hospitalization or inpatient management. They have remained hemodynamically stable throughout their entire ED visit and are stable for discharge with outpatient follow-up. The plan has been discussed in detail and they are aware of the specific conditions for emergent return, as well as the importance of follow-up. Discharge Medication List as of 6/14/2022 12:53 AM          Diagnosis:  1. Left upper quadrant abdominal pain        Disposition:  Patient's disposition: Discharge to home  Patient's condition is stable.          [address] [facility], DO  06/14/22 [facility],   06/17/22 1212

## 2022-06-24 DIAGNOSIS — K57.32 DIVERTICULITIS OF SIGMOID COLON: Primary | ICD-10-CM

## 2022-06-24 RX ORDER — POLYETHYLENE GLYCOL 3350, SODIUM SULFATE ANHYDROUS, SODIUM BICARBONATE, SODIUM CHLORIDE, POTASSIUM CHLORIDE 236; 22.74; 6.74; 5.86; 2.97 G/4L; G/4L; G/4L; G/4L; G/4L
4 POWDER, FOR SOLUTION ORAL ONCE
Qty: 4000 ML | Refills: 0 | Status: SHIPPED | OUTPATIENT
Start: 2022-06-24 | End: 2022-06-24

## 2022-06-24 NOTE — TELEPHONE ENCOUNTER
Patient phoned the office to reschedule his procedure from 7.27.2022 to 7.29.2022 with Dr. Arvin Soler. Call placed to surgery scheduling to reschedule procedure.   Electronically signed by Abdelrahman Simons on 6/24/22 at 2:56 PM EDT

## 2022-07-07 PROBLEM — N39.0 UTI (URINARY TRACT INFECTION): Status: RESOLVED | Noted: 2022-06-07 | Resolved: 2022-07-07

## 2022-07-28 NOTE — PROGRESS NOTES
[facility]                                                                                                                     PRE OP INSTRUCTIONS FOR  Karen Alcaraz. Date: 7/28/2022    Date and time of surgery: 7/29/22   Arrival Time: [facility] will call with time between 5-6. Do not eat or drink anything after 12 midnight prior to surgery. This includes no water, chewing gum, mints or ice chips. Take the following pills with a small sip of water on the morning of Surgery: Amlodipine and zoloft      Diabetics may take evening dose of insulin but none after midnight. If you feel symptomatic or low blood sugar take 1-2 ounces of apple juice only. Aspirin, Ibuprofen, Advil, Naproxen, Vitamin E and other Anti-inflammatory products should be stopped  before surgery  as directed by your physician. Check with your Doctor regarding stopping Plavix, Coumadin, Lovenox, Fragmin or other blood thinners. Do not smoke,use illicit drugs and do not drink any alcoholic beverages 24 hours prior to surgery. You may brush your teeth and gargle the morning of surgery. DO NOT SWALLOW WATER    You MUST make arrangements for a responsible adult to take you home after your surgery. You will not be allowed to leave alone or drive yourself home. It is strongly suggested someone stay with you the first 24 hrs. Your surgery will be cancelled if you do not have a ride home. A parent/legal guardian must accompany a child scheduled for surgery and plan to stay at the hospital until the child is discharged. Please do not bring other children with you. Please wear simple, loose fitting clothing to the hospital.  Rabia Stocknegro not bring valuables (money, credit cards, checkbooks, etc.) Do not wear any makeup (including no eye makeup) or nail polish on your fingers or toes. DO NOT wear any jewelry or piercings on day of surgery. All body piercing jewelry must be removed.     Shower the night before surgery with _X__Antibacterial soap ___Hibiclens. Remember to bring Blood Bank bracelet to the hospital on the day of surgery. If you have a Living Will and Durable Power of  for Healthcare, please bring in a copy. If appropriate bring crutches, inspirex, etc... Notify your Surgeon if you develop any illness between now and surgery time, cough, cold, fever, sore throat, nausea, vomiting, etc.  Please notify your surgeon if you experience dizziness, shortness of breath or blurred vision between now & the time of your surgery. If you have ___dentures, they will be removed before going to the OR; we will provide you a container. If you wear ___contact lenses or _X__glasses, they will be removed; please bring a case for them. To provide excellent care visitors will be limited to one in the room at any given time. Please bring picture ID and insurance card. Sleep apnea patients need to bring CPAP to hospital on day of surgery. Visit our web site for additional information: ThemeContent.[email or web address] [email or web address]_[email or web address]. [email or web address]    During flu season no children under the age of 15 are permitted in the hospital for the safety of all patients. Other Come in through main lobby, go to information desk. Please call pre admission testing if you have any further questions.    [address]     [address]

## 2022-07-29 ENCOUNTER — HOSPITAL ENCOUNTER (OUTPATIENT)
Age: 22
Setting detail: OUTPATIENT SURGERY
Discharge: HOME | End: 2022-07-29
Attending: SURGERY | Admitting: SURGERY
Payer: COMMERCIAL

## 2022-07-29 ENCOUNTER — ANESTHESIA EVENT (OUTPATIENT)
Dept: ENDOSCOPY | Age: 22
End: 2022-07-29
Payer: COMMERCIAL

## 2022-07-29 ENCOUNTER — ANESTHESIA (OUTPATIENT)
Dept: ENDOSCOPY | Age: 22
End: 2022-07-29
Payer: COMMERCIAL

## 2022-07-29 VITALS
OXYGEN SATURATION: 99 % | SYSTOLIC BLOOD PRESSURE: 140 MMHG | DIASTOLIC BLOOD PRESSURE: 88 MMHG | TEMPERATURE: 98.1 F | BODY MASS INDEX: 49.44 KG/M2 | HEART RATE: 76 BPM | RESPIRATION RATE: 18 BRPM | HEIGHT: 67 IN | WEIGHT: 315 LBS

## 2022-07-29 PROCEDURE — 3609027000 HC COLONOSCOPY: Performed by: SURGERY

## 2022-07-29 PROCEDURE — 6360000002 HC RX W HCPCS: Performed by: NURSE ANESTHETIST, CERTIFIED REGISTERED

## 2022-07-29 PROCEDURE — 2580000003 HC RX 258: Performed by: NURSE ANESTHETIST, CERTIFIED REGISTERED

## 2022-07-29 PROCEDURE — 7100000011 HC PHASE II RECOVERY - ADDTL 15 MIN: Performed by: SURGERY

## 2022-07-29 PROCEDURE — 3700000000 HC ANESTHESIA ATTENDED CARE: Performed by: SURGERY

## 2022-07-29 PROCEDURE — 7100000010 HC PHASE II RECOVERY - FIRST 15 MIN: Performed by: SURGERY

## 2022-07-29 PROCEDURE — 3700000001 HC ADD 15 MINUTES (ANESTHESIA): Performed by: SURGERY

## 2022-07-29 PROCEDURE — 2500000003 HC RX 250 WO HCPCS: Performed by: NURSE ANESTHETIST, CERTIFIED REGISTERED

## 2022-07-29 PROCEDURE — 2709999900 HC NON-CHARGEABLE SUPPLY: Performed by: SURGERY

## 2022-07-29 RX ORDER — SODIUM CHLORIDE 0.9 % (FLUSH) 0.9 %
5-40 SYRINGE (ML) INJECTION PRN
Status: DISCONTINUED | OUTPATIENT
Start: 2022-07-29 | End: 2022-07-29 | Stop reason: HOSPADM

## 2022-07-29 RX ORDER — PROPOFOL 10 MG/ML
INJECTION, EMULSION INTRAVENOUS PRN
Status: DISCONTINUED | OUTPATIENT
Start: 2022-07-29 | End: 2022-07-29 | Stop reason: SDUPTHER

## 2022-07-29 RX ORDER — SODIUM CHLORIDE 9 MG/ML
25 INJECTION, SOLUTION INTRAVENOUS PRN
Status: DISCONTINUED | OUTPATIENT
Start: 2022-07-29 | End: 2022-07-29 | Stop reason: HOSPADM

## 2022-07-29 RX ORDER — SODIUM CHLORIDE 0.9 % (FLUSH) 0.9 %
5-40 SYRINGE (ML) INJECTION EVERY 12 HOURS SCHEDULED
Status: DISCONTINUED | OUTPATIENT
Start: 2022-07-29 | End: 2022-07-29 | Stop reason: HOSPADM

## 2022-07-29 RX ORDER — SODIUM CHLORIDE, SODIUM LACTATE, POTASSIUM CHLORIDE, CALCIUM CHLORIDE 600; 310; 30; 20 MG/100ML; MG/100ML; MG/100ML; MG/100ML
INJECTION, SOLUTION INTRAVENOUS CONTINUOUS PRN
Status: DISCONTINUED | OUTPATIENT
Start: 2022-07-29 | End: 2022-07-29 | Stop reason: SDUPTHER

## 2022-07-29 RX ORDER — LIDOCAINE HYDROCHLORIDE 20 MG/ML
INJECTION, SOLUTION INFILTRATION; PERINEURAL PRN
Status: DISCONTINUED | OUTPATIENT
Start: 2022-07-29 | End: 2022-07-29 | Stop reason: SDUPTHER

## 2022-07-29 RX ORDER — SODIUM CHLORIDE, SODIUM LACTATE, POTASSIUM CHLORIDE, CALCIUM CHLORIDE 600; 310; 30; 20 MG/100ML; MG/100ML; MG/100ML; MG/100ML
INJECTION, SOLUTION INTRAVENOUS CONTINUOUS PRN
Status: DISCONTINUED | OUTPATIENT
Start: 2022-07-29 | End: 2022-07-29

## 2022-07-29 RX ORDER — SODIUM CHLORIDE, SODIUM LACTATE, POTASSIUM CHLORIDE, CALCIUM CHLORIDE 600; 310; 30; 20 MG/100ML; MG/100ML; MG/100ML; MG/100ML
INJECTION, SOLUTION INTRAVENOUS CONTINUOUS
Status: DISCONTINUED | OUTPATIENT
Start: 2022-07-29 | End: 2022-07-29 | Stop reason: HOSPADM

## 2022-07-29 RX ADMIN — PROPOFOL 300 MG: 10 INJECTION, EMULSION INTRAVENOUS at 12:18

## 2022-07-29 RX ADMIN — SODIUM CHLORIDE, POTASSIUM CHLORIDE, SODIUM LACTATE AND CALCIUM CHLORIDE: 600; 310; 30; 20 INJECTION, SOLUTION INTRAVENOUS at 12:13

## 2022-07-29 RX ADMIN — LIDOCAINE HYDROCHLORIDE 120 MG: 20 INJECTION, SOLUTION INFILTRATION; PERINEURAL at 12:18

## 2022-07-29 ASSESSMENT — PAIN - FUNCTIONAL ASSESSMENT: PAIN_FUNCTIONAL_ASSESSMENT: NONE - DENIES PAIN

## 2022-07-29 ASSESSMENT — LIFESTYLE VARIABLES: SMOKING_STATUS: 0

## 2022-07-29 NOTE — ANESTHESIA POSTPROCEDURE EVALUATION
Department of Anesthesiology  Postprocedure Note    Patient: Burnard Goltz. MRN: [id number]  YOB: 2000  Date of evaluation: 7/29/2022      Procedure Summary     Date: 07/29/22 Room / Location: [address] [place] [address] / [facility]    Anesthesia Start: 4482 Anesthesia Stop: 9658    Procedure: COLONOSCOPY Diagnosis:       Diverticulitis      (Diverticulitis [K57.92])    Surgeons: Felicity Chahal MD Responsible Provider: Javed Abebe MD    Anesthesia Type: MAC ASA Status: 3          Anesthesia Type: No value filed.     Hilda Phase I: Hilda Score: 10    Hilda Phase II: Hilda Score: 10      Anesthesia Post Evaluation    Patient location during evaluation: PACU  Patient participation: complete - patient participated  Level of consciousness: awake  Airway patency: patent  Nausea & Vomiting: no nausea and no vomiting  Complications: no  Cardiovascular status: hemodynamically stable  Respiratory status: acceptable  Hydration status: stable

## 2022-07-29 NOTE — OP NOTE
withdrawal time was 8 minutes.       The patient tolerated the procedure well        SPECIMENS:  None    PLAN:    Resume high fiber diet  64oz water intake daily    Recommendation is for next colonoscopy per NCCN guidelines- 10 years      Lexis Anderson MD  7/29/2022  11:27 AM

## 2022-07-29 NOTE — DISCHARGE INSTRUCTIONS
Other instructions    For your safety, do not drive or operate machinery until the medicine wears off and you can think clearly. Your doctor may tell you not to drive or operate machinery until the day after your test.     Do not sign legal documents or make major decisions until the medicine wears off and you can think clearly. The anesthesia can make it hard for you to fully understand what you are agreeing to. Follow-up care is a key part of your treatment and safety. Be sure to make and go to all appointments, and call your doctor if you are having problems. It's also a good idea to know your test results and keep alist of the medicines you take. When should you call for help? Call 911 anytime you think you may need emergency care. For example, call if:    You passed out (lost consciousness). You pass maroon or bloody stools. You have trouble breathing. Call your doctor now or seek immediate medical care if:    You have pain that does not get better after you take pain medicine. You are sick to your stomach or cannot drink fluids. You have new or worse belly pain. You have blood in your stools. You have a fever. You cannot pass stools or gas. Watch closely for changes in your health, and be sure to contact your doctor ifyou have any problems. Where can you learn more? Go to https://Tranz.dev9k. org and sign in to your [employer] account. Enter E264 in the [facility] box to learn more about \"Colonoscopy: What to Expect at Home. \"     If you do not have an account, please click on the \"Sign Up Now\" link. Current as of: September 8, 2021               Content Version: 13.3  © 5898-6167 Healthwise, Incorporated. Care instructions adapted under license by [facility] ([facility]).  If you have questions about a medical condition or this instruction, always ask your healthcare professional. [address] any warranty or liability for your use of this information.

## 2022-07-29 NOTE — H&P
General Surgery History and Physical  [facility]    Patient's Name/Date of Birth: Clarke Alicea. / 2000    Date: July 29, 2022     Surgeon: Vlad Fregoso MD    PCP: CATIE Duque NP     Chief Complaint: recent diverticulitis    HPI:   Clarke Wills is a 20 y.o. male who presents for evaluation of colonoscopy. He was recently admitted with perforated diverticulitis. He was treated non operatively. He has recovered well. He denies nausea, vomiting, constipation, diarrhea, headache, chest pain, shortness of breath, fevers, chills. Patient Active Problem List   Diagnosis    Other sprain of right middle finger, initial encounter    Tendinitis    Pain of finger of right hand    Perforated abdominal viscus    Diverticulitis of sigmoid colon    Morbid obesity with BMI of 60.0-69.9, adult ([address]    Perforation of sigmoid colon due to diverticulitis    Primary hypertension       Past Medical History:   Diagnosis Date    Arthritis     Asperger syndrome     Asthma     Hypertension     Obesity     Sleep apnea        Past Surgical History:   Procedure Laterality Date    ADENOIDECTOMY      COLONOSCOPY      TONSILLECTOMY      WISDOM TOOTH EXTRACTION         No Known Allergies    The patient has a family history that is negative for severe cardiovascular or respiratory issues, negative for reaction to anesthesia. Time spent reviewing past medical, surgical, social and family history, vitals, nursing assessment and images. No changes from above documented history.     Social History     Socioeconomic History    Marital status: Single     Spouse name: Not on file    Number of children: Not on file    Years of education: Not on file    Highest education level: Not on file   Occupational History    Not on file   Tobacco Use    Smoking status: Never    Smokeless tobacco: Never   Vaping Use    Vaping Use: Former    Substances: Nicotine, not ready to quit   Substance and Sexual Activity Alcohol use: Yes     Alcohol/week: 1.0 standard drink     Types: 1 Cans of beer per week     Comment: 1/month    Drug use: Never    Sexual activity: Not Currently   Other Topics Concern    Not on file   Social History Narrative    ** Merged History Encounter **          Social Determinants of Health     Financial Resource Strain: Not on file   Food Insecurity: Not on file   Transportation Needs: Not on file   Physical Activity: Not on file   Stress: Not on file   Social Connections: Not on file   Intimate Partner Violence: Not on file   Housing Stability: Not on file       I have reviewed relevant labs from this admission and interpretation is included in my assessment and plan    Review of Systems    A complete 10 system review was performed and are otherwise negative unless mentioned in the above HPI. Specific negatives are listed below but may not include all those reviewed.     General ROS: negative obtundation, AMS  ENT ROS: negative rhinorrhea, epistaxis  Allergy and Immunology ROS: negative itchy/watery eyes or nasal congestion  Hematological and Lymphatic ROS: negative spontaneous bleeding or bruising  Endocrine ROS: negative  lethargy, mood swings, palpitations or polydipsia/polyuria  Respiratory ROS: negative sputum changes, stridor, tachypnea or wheezing  Cardiovascular ROS: negative for - loss of consciousness, murmur or orthopnea  Gastrointestinal ROS: negative for - hematochezia or hematemesis  Genito-Urinary ROS: negative for -  genital discharge or hematuria  Musculoskeletal ROS: negative for - focal weakness, gangrene  Psych/Neuro ROS: negative for - visual or auditory hallucinations, suicidal ideation    Physical exam:   Ht 5' 7\" (1.702 m)   Wt (!) 360 lb (163.3 kg)   BMI 56.38 kg/m²   General appearance:  NAD, appears stated age  Head: NCAT, PERRLA, EOMI, red conjunctiva  Neck: supple, no masses, trachea midline  Lungs: Equal chest rise bilateral, no retractions, no wheezing  Heart: Reg rate  Abdomen: soft, nontender, nondistended  Skin; warm and dry, no cyanosis  Gu: no cva tenderness  Extremities: atraumatic, no focal motor deficits, no open wounds  Psych: No tremor, visual hallucinations      Radiology: n.a    Assessment:  Deborah Sanchez. is a 20 y.o. male with recent episode of diverticulitis, with perforation  Patient Active Problem List   Diagnosis    Other sprain of right middle finger, initial encounter    Tendinitis    Pain of finger of right hand    Perforated abdominal viscus    Diverticulitis of sigmoid colon    Morbid obesity with BMI of 60.0-69.9, adult (HCC)    Perforation of sigmoid colon due to diverticulitis    Primary hypertension         Plan:  Proceed with colonoscopy  -The procedure, risks, benefits and alternatives were discussed with patient. he   agrees to proceed.         Anthony Vargas MD  4:47 AM

## 2022-07-29 NOTE — ANESTHESIA PRE PROCEDURE
injection 5-40 mL  5-40 mL IntraVENous 2 times per day Kira Palmer MD        sodium chloride flush 0.9 % injection 5-40 mL  5-40 mL IntraVENous PRN Kira Palmer MD        0.9 % sodium chloride infusion  25 mL IntraVENous PRN Kira Palmer MD        lactated ringers infusion   IntraVENous Continuous Jerica Lopez MD           Allergies:  No Known Allergies    Problem List:    Patient Active Problem List   Diagnosis Code    Other sprain of right middle finger, initial encounter S63.692A    Tendinitis M77.9    Pain of finger of right hand M79.644    Perforated abdominal viscus R19.8    Diverticulitis of sigmoid colon K57.32    Morbid obesity with BMI of 60.0-69.9, adult ([facility] E66.01, Z68.44    Perforation of sigmoid colon due to diverticulitis K57.20    Primary hypertension I10       Past Medical History:        Diagnosis Date    Arthritis     Asperger syndrome     Asthma     Hypertension     Obesity     Sleep apnea        Past Surgical History:        Procedure Laterality Date    ADENOIDECTOMY      COLONOSCOPY      TONSILLECTOMY      WISDOM TOOTH EXTRACTION         Social History:    Social History     Tobacco Use    Smoking status: Never    Smokeless tobacco: Never   Substance Use Topics    Alcohol use:  Yes     Alcohol/week: 1.0 standard drink     Types: 1 Cans of beer per week     Comment: 1/month                                Counseling given: Not Answered      Vital Signs (Current):   Vitals:    07/28/22 1341 07/29/22 1045   BP:  119/68   Pulse:  78   Resp:  18   Temp:  99 °F (37.2 °C)   SpO2:  100%   Weight: (!) 360 lb (163.3 kg) (!) 376 lb 4.8 oz (170.7 kg)   Height: 5' 7\" (1.702 m) 5' 7\" (1.702 m)                                              BP Readings from Last 3 Encounters:   07/29/22 119/68   06/14/22 139/86   06/09/22 (!) 152/70       NPO Status: Time of last liquid consumption: 2300                        Time of last solid consumption: 2300 Date of last liquid consumption: 07/28/22                        Date of last solid food consumption: 07/27/22    BMI:   Wt Readings from Last 3 Encounters:   07/29/22 (!) 376 lb 4.8 oz (170.7 kg)   06/13/22 (!) 390 lb (176.9 kg)   06/06/22 (!) 392 lb (177.8 kg)     Body mass index is 58.94 kg/m². CBC:   Lab Results   Component Value Date/Time    WBC 12.7 06/13/2022 06:58 PM    RBC 5.09 06/13/2022 06:58 PM    HGB 14.8 06/13/2022 06:58 PM    HCT 45.3 06/13/2022 06:58 PM    MCV 89.0 06/13/2022 06:58 PM    RDW 13.2 06/13/2022 06:58 PM     06/13/2022 06:58 PM       CMP:   Lab Results   Component Value Date/Time     06/13/2022 06:58 PM    K 3.9 06/13/2022 06:58 PM     06/13/2022 06:58 PM    CO2 24 06/13/2022 06:58 PM    BUN 11 06/13/2022 06:58 PM    CREATININE 0.8 06/13/2022 06:58 PM    GFRAA >60 06/13/2022 06:58 PM    LABGLOM >60 06/13/2022 06:58 PM    GLUCOSE 126 06/13/2022 06:58 PM    GLUCOSE 93 11/05/2011 08:40 AM    PROT 7.2 06/13/2022 06:58 PM    CALCIUM 8.9 06/13/2022 06:58 PM    BILITOT <0.2 06/13/2022 06:58 PM    ALKPHOS 106 06/13/2022 06:58 PM    AST 41 06/13/2022 06:58 PM    ALT 44 06/13/2022 06:58 PM       POC Tests: No results for input(s): POCGLU, POCNA, POCK, POCCL, POCBUN, POCHEMO, POCHCT in the last 72 hours.     Coags: No results found for: PROTIME, INR, APTT    HCG (If Applicable): No results found for: PREGTESTUR, PREGSERUM, HCG, HCGQUANT     ABGs: No results found for: PHART, PO2ART, BNA4UYY, JFZ1WWN, BEART, A7CVVMIR     Type & Screen (If Applicable):  No results found for: LABABO, LABRH    Drug/Infectious Status (If Applicable):  No results found for: HIV, HEPCAB    COVID-19 Screening (If Applicable): No results found for: COVID19        Anesthesia Evaluation  Patient summary reviewed no history of anesthetic complications:   Airway: Mallampati: II  TM distance: >3 FB   Neck ROM: full  Mouth opening: > = 3 FB   Dental: normal exam         Pulmonary: breath sounds clear to auscultation  (+) sleep apnea:  asthma:     (-) not a current smoker                           Cardiovascular:    (+) hypertension:,         Rhythm: regular  Rate: normal                    Neuro/Psych:   (+) psychiatric history (Asperger syndrome):            GI/Hepatic/Renal:   (+) bowel prep,          ROS comment: Diverticulitis of sigmoid colon  . Endo/Other:    (+) : arthritis: OA., .                 Abdominal:   (+) obese,           Vascular: negative vascular ROS. Other Findings:           Anesthesia Plan      MAC     ASA 3       Induction: intravenous. Anesthetic plan and risks discussed with patient. Plan discussed with CRNA.                     Margaret Jain MD   7/29/2022

## 2022-08-13 ENCOUNTER — HOSPITAL ENCOUNTER (EMERGENCY)
Age: 22
Discharge: HOME | End: 2022-08-14
Attending: STUDENT IN AN ORGANIZED HEALTH CARE EDUCATION/TRAINING PROGRAM
Payer: COMMERCIAL

## 2022-08-13 ENCOUNTER — APPOINTMENT (OUTPATIENT)
Dept: GENERAL RADIOLOGY | Age: 22
End: 2022-08-13
Payer: COMMERCIAL

## 2022-08-13 DIAGNOSIS — U07.1 COVID-19: Primary | ICD-10-CM

## 2022-08-13 LAB
ALBUMIN SERPL-MCNC: 3.7 G/DL (ref 3.5–5.2)
ALP BLD-CCNC: 110 U/L (ref 40–129)
ALT SERPL-CCNC: 34 U/L (ref 0–40)
ANION GAP SERPL CALCULATED.3IONS-SCNC: 11 MMOL/L (ref 7–16)
AST SERPL-CCNC: 31 U/L (ref 0–39)
BASOPHILS ABSOLUTE: 0.04 E9/L (ref 0–0.2)
BASOPHILS RELATIVE PERCENT: 0.5 % (ref 0–2)
BILIRUB SERPL-MCNC: <0.2 MG/DL (ref 0–1.2)
BUN BLDV-MCNC: 10 MG/DL (ref 6–20)
CALCIUM SERPL-MCNC: 9.2 MG/DL (ref 8.6–10.2)
CHLORIDE BLD-SCNC: 102 MMOL/L (ref 98–107)
CO2: 24 MMOL/L (ref 22–29)
CREAT SERPL-MCNC: 0.7 MG/DL (ref 0.7–1.2)
D DIMER: 613 NG/ML DDU
EOSINOPHILS ABSOLUTE: 0.12 E9/L (ref 0.05–0.5)
EOSINOPHILS RELATIVE PERCENT: 1.4 % (ref 0–6)
GFR AFRICAN AMERICAN: >60
GFR NON-AFRICAN AMERICAN: >60 ML/MIN/1.73
GLUCOSE BLD-MCNC: 112 MG/DL (ref 74–99)
HCT VFR BLD CALC: 44.6 % (ref 37–54)
HEMOGLOBIN: 14.8 G/DL (ref 12.5–16.5)
IMMATURE GRANULOCYTES #: 0.13 E9/L
IMMATURE GRANULOCYTES %: 1.5 % (ref 0–5)
LYMPHOCYTES ABSOLUTE: 1.54 E9/L (ref 1.5–4)
LYMPHOCYTES RELATIVE PERCENT: 17.9 % (ref 20–42)
MCH RBC QN AUTO: 29.5 PG (ref 26–35)
MCHC RBC AUTO-ENTMCNC: 33.2 % (ref 32–34.5)
MCV RBC AUTO: 89 FL (ref 80–99.9)
MONOCYTES ABSOLUTE: 0.99 E9/L (ref 0.1–0.95)
MONOCYTES RELATIVE PERCENT: 11.5 % (ref 2–12)
NEUTROPHILS ABSOLUTE: 5.77 E9/L (ref 1.8–7.3)
NEUTROPHILS RELATIVE PERCENT: 67.2 % (ref 43–80)
PDW BLD-RTO: 13.3 FL (ref 11.5–15)
PLATELET # BLD: 295 E9/L (ref 130–450)
PMV BLD AUTO: 9.6 FL (ref 7–12)
POTASSIUM REFLEX MAGNESIUM: 4 MMOL/L (ref 3.5–5)
RBC # BLD: 5.01 E12/L (ref 3.8–5.8)
SARS-COV-2, NAAT: DETECTED
SODIUM BLD-SCNC: 137 MMOL/L (ref 132–146)
TOTAL PROTEIN: 7.2 G/DL (ref 6.4–8.3)
TROPONIN, HIGH SENSITIVITY: 7 NG/L (ref 0–11)
WBC # BLD: 8.6 E9/L (ref 4.5–11.5)

## 2022-08-13 PROCEDURE — 85025 COMPLETE CBC W/AUTO DIFF WBC: CPT

## 2022-08-13 PROCEDURE — 80053 COMPREHEN METABOLIC PANEL: CPT

## 2022-08-13 PROCEDURE — 71045 X-RAY EXAM CHEST 1 VIEW: CPT

## 2022-08-13 PROCEDURE — 87635 SARS-COV-2 COVID-19 AMP PRB: CPT

## 2022-08-13 PROCEDURE — 96374 THER/PROPH/DIAG INJ IV PUSH: CPT

## 2022-08-13 PROCEDURE — 93005 ELECTROCARDIOGRAM TRACING: CPT | Performed by: STUDENT IN AN ORGANIZED HEALTH CARE EDUCATION/TRAINING PROGRAM

## 2022-08-13 PROCEDURE — 85378 FIBRIN DEGRADE SEMIQUANT: CPT

## 2022-08-13 PROCEDURE — 99285 EMERGENCY DEPT VISIT HI MDM: CPT

## 2022-08-13 PROCEDURE — 2580000003 HC RX 258: Performed by: STUDENT IN AN ORGANIZED HEALTH CARE EDUCATION/TRAINING PROGRAM

## 2022-08-13 PROCEDURE — 71275 CT ANGIOGRAPHY CHEST: CPT

## 2022-08-13 PROCEDURE — 84484 ASSAY OF TROPONIN QUANT: CPT

## 2022-08-13 PROCEDURE — 6360000002 HC RX W HCPCS: Performed by: STUDENT IN AN ORGANIZED HEALTH CARE EDUCATION/TRAINING PROGRAM

## 2022-08-13 RX ORDER — 0.9 % SODIUM CHLORIDE 0.9 %
1000 INTRAVENOUS SOLUTION INTRAVENOUS ONCE
Status: COMPLETED | OUTPATIENT
Start: 2022-08-13 | End: 2022-08-14

## 2022-08-13 RX ORDER — KETOROLAC TROMETHAMINE 15 MG/ML
15 INJECTION, SOLUTION INTRAMUSCULAR; INTRAVENOUS ONCE
Status: COMPLETED | OUTPATIENT
Start: 2022-08-13 | End: 2022-08-13

## 2022-08-13 RX ADMIN — SODIUM CHLORIDE 1000 ML: 9 INJECTION, SOLUTION INTRAVENOUS at 22:35

## 2022-08-13 RX ADMIN — KETOROLAC TROMETHAMINE 15 MG: 15 INJECTION, SOLUTION INTRAMUSCULAR; INTRAVENOUS at 22:36

## 2022-08-13 ASSESSMENT — LIFESTYLE VARIABLES: HOW OFTEN DO YOU HAVE A DRINK CONTAINING ALCOHOL: MONTHLY OR LESS

## 2022-08-13 ASSESSMENT — PAIN - FUNCTIONAL ASSESSMENT: PAIN_FUNCTIONAL_ASSESSMENT: 0-10

## 2022-08-13 ASSESSMENT — PAIN DESCRIPTION - DESCRIPTORS: DESCRIPTORS: ACHING

## 2022-08-13 NOTE — Clinical Note
Evelyn Kumar was seen and treated in our emergency department on 8/13/2022. He may return to work on 08/20/2022. If you have any questions or concerns, please don't hesitate to call.       Mu Prince, DO

## 2022-08-14 ENCOUNTER — APPOINTMENT (OUTPATIENT)
Dept: CT IMAGING | Age: 22
End: 2022-08-14
Payer: COMMERCIAL

## 2022-08-14 VITALS
HEIGHT: 70 IN | BODY MASS INDEX: 45.1 KG/M2 | WEIGHT: 315 LBS | OXYGEN SATURATION: 100 % | TEMPERATURE: 98.2 F | DIASTOLIC BLOOD PRESSURE: 77 MMHG | SYSTOLIC BLOOD PRESSURE: 166 MMHG | HEART RATE: 98 BPM | RESPIRATION RATE: 18 BRPM

## 2022-08-14 LAB
EKG ATRIAL RATE: 93 BPM
EKG P AXIS: 25 DEGREES
EKG P-R INTERVAL: 146 MS
EKG Q-T INTERVAL: 352 MS
EKG QRS DURATION: 80 MS
EKG QTC CALCULATION (BAZETT): 437 MS
EKG R AXIS: 63 DEGREES
EKG T AXIS: 4 DEGREES
EKG VENTRICULAR RATE: 93 BPM

## 2022-08-14 PROCEDURE — 6360000004 HC RX CONTRAST MEDICATION: Performed by: RADIOLOGY

## 2022-08-14 PROCEDURE — 71275 CT ANGIOGRAPHY CHEST: CPT

## 2022-08-14 RX ADMIN — IOPAMIDOL 75 ML: 755 INJECTION, SOLUTION INTRAVENOUS at 00:21

## 2022-08-14 ASSESSMENT — PAIN - FUNCTIONAL ASSESSMENT: PAIN_FUNCTIONAL_ASSESSMENT: NONE - DENIES PAIN

## 2022-08-14 NOTE — ED PROVIDER NOTES
7/29/2022). Social History:  reports that he has never smoked. He has never used smokeless tobacco. He reports current alcohol use of about 1.0 standard drink per week. He reports that he does not use drugs. Family History: family history is not on file. . Unless otherwise noted, family history is non contributory    The patients home medications have been reviewed. Allergies: Patient has no known allergies. I have reviewed the past medical history, past surgical history, social history, and family history    ---------------------------------------------------PHYSICAL EXAM--------------------------------------    Constitutional/General: Alert and oriented x3, obese male sitting up in bed in no acute distress  Head: Normocephalic and atraumatic  Eyes:  EOMI, sclera non icteric  ENT: Oropharynx clear, handling secretions, no trismus, no asymmetry of the posterior oropharynx or uvular edema  Neck: Supple, full ROM, no stridor, no meningeal signs  Respiratory: Lungs clear to auscultation bilaterally, no wheezes, rales, or rhonchi. Not in respiratory distress  Cardiovascular:  Tachycardic rate. Regular rhythm. No murmurs, no gallops, no rubs. 2+ distal pulses. Equal extremity pulses. Gastrointestinal:  Abdomen Soft, Non tender, Non distended. No rebound, guarding, or rigidity. No pulsatile masses. Musculoskeletal: Moves all extremities x 4. Warm and well perfused, no clubbing, no cyanosis, no edema. Capillary refill <3 seconds  Skin: skin warm and dry. No rashes. Neurologic: GCS 15, no focal deficits, symmetric strength 5/5 in the upper and lower extremities bilaterally  Psychiatric: Normal Affect    -------------------------------------------------- RESULTS -------------------------------------------------  I have personally reviewed all laboratory and imaging results for this patient. Results are listed below.      LABS: (Lab results interpreted by me)  Results for orders placed or performed during the hospital encounter of 08/13/22   COVID-19, Rapid    Specimen: Nasopharyngeal Swab   Result Value Ref Range    SARS-CoV-2, NAAT DETECTED (A) Not Detected   CBC with Auto Differential   Result Value Ref Range    WBC 8.6 4.5 - 11.5 E9/L    RBC 5.01 3.80 - 5.80 E12/L    Hemoglobin 14.8 12.5 - 16.5 g/dL    Hematocrit 44.6 37.0 - 54.0 %    MCV 89.0 80.0 - 99.9 fL    MCH 29.5 26.0 - 35.0 pg    MCHC 33.2 32.0 - 34.5 %    RDW 13.3 11.5 - 15.0 fL    Platelets 657 571 - 845 E9/L    MPV 9.6 7.0 - 12.0 fL    Neutrophils % 67.2 43.0 - 80.0 %    Immature Granulocytes % 1.5 0.0 - 5.0 %    Lymphocytes % 17.9 (L) 20.0 - 42.0 %    Monocytes % 11.5 2.0 - 12.0 %    Eosinophils % 1.4 0.0 - 6.0 %    Basophils % 0.5 0.0 - 2.0 %    Neutrophils Absolute 5.77 1.80 - 7.30 E9/L    Immature Granulocytes # 0.13 E9/L    Lymphocytes Absolute 1.54 1.50 - 4.00 E9/L    Monocytes Absolute 0.99 (H) 0.10 - 0.95 E9/L    Eosinophils Absolute 0.12 0.05 - 0.50 E9/L    Basophils Absolute 0.04 0.00 - 0.20 E9/L   Comprehensive Metabolic Panel w/ Reflex to MG   Result Value Ref Range    Sodium 137 132 - 146 mmol/L    Potassium reflex Magnesium 4.0 3.5 - 5.0 mmol/L    Chloride 102 98 - 107 mmol/L    CO2 24 22 - 29 mmol/L    Anion Gap 11 7 - 16 mmol/L    Glucose 112 (H) 74 - 99 mg/dL    BUN 10 6 - 20 mg/dL    Creatinine 0.7 0.7 - 1.2 mg/dL    GFR Non-African American >60 >=60 mL/min/1.73    GFR African American >60     Calcium 9.2 8.6 - 10.2 mg/dL    Total Protein 7.2 6.4 - 8.3 g/dL    Albumin 3.7 3.5 - 5.2 g/dL    Total Bilirubin <0.2 0.0 - 1.2 mg/dL    Alkaline Phosphatase 110 40 - 129 U/L    ALT 34 0 - 40 U/L    AST 31 0 - 39 U/L   Troponin   Result Value Ref Range    Troponin, High Sensitivity 7 0 - 11 ng/L   D-Dimer, Quantitative   Result Value Ref Range    D-Dimer, Quant 613 ng/mL DDU   EKG 12 Lead   Result Value Ref Range    Ventricular Rate 93 BPM    Atrial Rate 93 BPM    P-R Interval 146 ms    QRS Duration 80 ms    Q-T Interval 352 ms    QTc Calculation (Guido) 437 ms    P Axis 25 degrees    R Axis 63 degrees    T Axis 4 degrees   ,       RADIOLOGY:  Interpreted by Radiologist unless otherwise specified  CTA PULMONARY W CONTRAST   Final Result   No evidence of pulmonary embolism or acute pulmonary abnormality. XR CHEST PORTABLE   Final Result   Scattered ill-defined faint opacities in the lungs which may reflect changes   related to an underlying infectious/inflammatory process. No pleural   effusion or pneumothorax. RECOMMENDATION:   (Recommend upright PA and lateral chest radiographs 10-12 weeks after   resolution of patient's symptoms to ensure complete resolution of   radiographic findings.)               EKG Interpretation  Interpreted by emergency department physician, Dr. Christopher Uriarte   EKG: This EKG is signed and interpreted by me. Rate: 93  Rhythm: Sinus  Interpretation: Normal sinus rhythm, normal axis, no acute ST elevations or depressions, intervals within normal limits, QTC is 437  Comparison: stable as compared to patient's most recent EKG         ------------------------- NURSING NOTES AND VITALS REVIEWED ---------------------------   The nursing notes within the ED encounter and vital signs as below have been reviewed by myself  BP (!) 166/77   Pulse 98   Temp 98.2 °F (36.8 °C) (Oral)   Resp 18   Ht 5' 10\" (1.778 m)   Wt (!) 375 lb (170.1 kg)   SpO2 100%   BMI 53.81 kg/m²     Oxygen Saturation Interpretation: Normal    The patients available past medical records and past encounters were reviewed. ------------------------------ ED COURSE/MEDICAL DECISION MAKING----------------------  Medications   0.9 % sodium chloride bolus (0 mLs IntraVENous Stopped 8/14/22 0138)   ketorolac (TORADOL) injection 15 mg (15 mg IntraVENous Given 8/13/22 2236)   iopamidol (ISOVUE-370) 76 % injection 75 mL (75 mLs IntraVENous Given 8/14/22 0021)           The cardiac monitor revealed NSR with a heart rate in the 100s as interpreted by me.  The cardiac monitor was ordered secondary to the patient's shortness of breath and to monitor the patient for dysrhythmia. CPT [id number]       I, Dr. Jose Juan Keita, am the primary provider of record    Medical Decision Making:   The patient is a 23-year-old male presents the emergency department complaining of concern for COVID. He slightly tachycardic on arrival otherwise hemodynamically stable, nontoxic, no acute distress. He remained 96% with ambulation in the ED. D-dimer was elevated. Labs are otherwise reassuring and EKG did not show evidence of acute ischemia. CTA was negative for PE. Patient was not hypoxic with ambulation and was resting comfortably upon multiple reassessments. Strict return precautions were given if his pulse ox dropped to 88% on room air. Patient was discharged home in stable condition. Oxygen Saturation Interpretation: 97 % on room air. Re-Evaluations:  ED Course as of 08/14/22 1206   Sun Aug 14, 2022   0031 Patient resting comfortably and in no distress on reassessment. [KG]      ED Course User Index  [KG] Rosemary Andrade, DO       Feeling better. This patient's ED course included: a personal history and physicial examination, re-evaluation prior to disposition, multiple bedside re-evaluations, IV medications, cardiac monitoring, continuous pulse oximetry, and complex medical decision making and emergency management    This patient has remained hemodynamically stable during their ED course. Counseling: The emergency provider has spoken with the patient and discussed todays results, in addition to providing specific details for the plan of care and counseling regarding the diagnosis and prognosis.   Questions are answered at this time and they are agreeable with the plan.       --------------------------------- IMPRESSION AND DISPOSITION ---------------------------------    IMPRESSION  1. COVID-19        DISPOSITION  Disposition: Discharge to home  Patient condition is stable        NOTE: This report was transcribed using voice recognition software.  Every effort was made to ensure accuracy; however, inadvertent computerized transcription errors may be present       Sandy Matt DO  08/14/22 [id number]

## 2022-08-14 NOTE — DISCHARGE INSTRUCTIONS
Use Tylenol and Ibuprofen for fever and symptom control. Return if your pulse ox drops below 88% on room air.

## 2022-08-25 ENCOUNTER — APPOINTMENT (OUTPATIENT)
Dept: CT IMAGING | Age: 22
End: 2022-08-25
Payer: COMMERCIAL

## 2022-08-25 ENCOUNTER — HOSPITAL ENCOUNTER (EMERGENCY)
Age: 22
Discharge: HOME | End: 2022-08-25
Attending: EMERGENCY MEDICINE
Payer: COMMERCIAL

## 2022-08-25 VITALS
DIASTOLIC BLOOD PRESSURE: 68 MMHG | HEART RATE: 101 BPM | TEMPERATURE: 98 F | RESPIRATION RATE: 26 BRPM | SYSTOLIC BLOOD PRESSURE: 140 MMHG | OXYGEN SATURATION: 99 %

## 2022-08-25 DIAGNOSIS — U07.1 COVID-19 VIRUS INFECTION: Primary | ICD-10-CM

## 2022-08-25 LAB
ANION GAP SERPL CALCULATED.3IONS-SCNC: 11 MMOL/L (ref 7–16)
BASOPHILS ABSOLUTE: 0.06 E9/L (ref 0–0.2)
BASOPHILS RELATIVE PERCENT: 0.4 % (ref 0–2)
BUN BLDV-MCNC: 11 MG/DL (ref 6–20)
CALCIUM SERPL-MCNC: 9.3 MG/DL (ref 8.6–10.2)
CHLORIDE BLD-SCNC: 98 MMOL/L (ref 98–107)
CO2: 26 MMOL/L (ref 22–29)
CREAT SERPL-MCNC: 0.8 MG/DL (ref 0.7–1.2)
EOSINOPHILS ABSOLUTE: 0.09 E9/L (ref 0.05–0.5)
EOSINOPHILS RELATIVE PERCENT: 0.6 % (ref 0–6)
GFR AFRICAN AMERICAN: >60
GFR NON-AFRICAN AMERICAN: >60 ML/MIN/1.73
GLUCOSE BLD-MCNC: 132 MG/DL (ref 74–99)
HCT VFR BLD CALC: 45.8 % (ref 37–54)
HEMOGLOBIN: 14.9 G/DL (ref 12.5–16.5)
IMMATURE GRANULOCYTES #: 0.28 E9/L
IMMATURE GRANULOCYTES %: 1.9 % (ref 0–5)
LYMPHOCYTES ABSOLUTE: 3.02 E9/L (ref 1.5–4)
LYMPHOCYTES RELATIVE PERCENT: 20.9 % (ref 20–42)
MCH RBC QN AUTO: 28.8 PG (ref 26–35)
MCHC RBC AUTO-ENTMCNC: 32.5 % (ref 32–34.5)
MCV RBC AUTO: 88.6 FL (ref 80–99.9)
MONOCYTES ABSOLUTE: 1.08 E9/L (ref 0.1–0.95)
MONOCYTES RELATIVE PERCENT: 7.5 % (ref 2–12)
NEUTROPHILS ABSOLUTE: 9.94 E9/L (ref 1.8–7.3)
NEUTROPHILS RELATIVE PERCENT: 68.7 % (ref 43–80)
PDW BLD-RTO: 13.2 FL (ref 11.5–15)
PLATELET # BLD: 357 E9/L (ref 130–450)
PMV BLD AUTO: 9.6 FL (ref 7–12)
POTASSIUM SERPL-SCNC: 3.9 MMOL/L (ref 3.5–5)
RBC # BLD: 5.17 E12/L (ref 3.8–5.8)
SODIUM BLD-SCNC: 135 MMOL/L (ref 132–146)
TROPONIN, HIGH SENSITIVITY: 7 NG/L (ref 0–11)
WBC # BLD: 14.5 E9/L (ref 4.5–11.5)

## 2022-08-25 PROCEDURE — 84484 ASSAY OF TROPONIN QUANT: CPT

## 2022-08-25 PROCEDURE — 71275 CT ANGIOGRAPHY CHEST: CPT

## 2022-08-25 PROCEDURE — 99285 EMERGENCY DEPT VISIT HI MDM: CPT

## 2022-08-25 PROCEDURE — 80048 BASIC METABOLIC PNL TOTAL CA: CPT

## 2022-08-25 PROCEDURE — 93005 ELECTROCARDIOGRAM TRACING: CPT | Performed by: EMERGENCY MEDICINE

## 2022-08-25 PROCEDURE — 6360000004 HC RX CONTRAST MEDICATION: Performed by: RADIOLOGY

## 2022-08-25 PROCEDURE — 85025 COMPLETE CBC W/AUTO DIFF WBC: CPT

## 2022-08-25 RX ADMIN — IOPAMIDOL 80 ML: 755 INJECTION, SOLUTION INTRAVENOUS at 02:24

## 2022-08-25 ASSESSMENT — ENCOUNTER SYMPTOMS
SORE THROAT: 0
WHEEZING: 0
VOMITING: 0
EYE PAIN: 0
SINUS PRESSURE: 0
EYE REDNESS: 0
COUGH: 0
BACK PAIN: 0
DIARRHEA: 0
SPUTUM PRODUCTION: 0
SHORTNESS OF BREATH: 1
EYE DISCHARGE: 0
NAUSEA: 0
ABDOMINAL PAIN: 0

## 2022-08-25 NOTE — ED PROVIDER NOTES
Patient reports he was diagnosed with COVID-19 2 weeks ago. He states this evening, he had sudden onset of shortness of breath and tachycardia. He denies any inciting event. Shortness of Breath  Severity:  Moderate  Onset quality:  Sudden  Duration:  1 hour  Timing:  Constant  Progression:  Unchanged  Relieved by:  None tried  Worsened by:  Nothing  Ineffective treatments:  None tried  Associated symptoms: no abdominal pain, no chest pain, no cough, no fever, no headaches, no rash, no sore throat, no sputum production, no vomiting and no wheezing       Review of Systems   Constitutional:  Negative for chills and fever. HENT:  Negative for sinus pressure and sore throat. Eyes:  Negative for pain, discharge and redness. Respiratory:  Positive for shortness of breath. Negative for cough, sputum production and wheezing. Cardiovascular:  Negative for chest pain. Gastrointestinal:  Negative for abdominal pain, diarrhea, nausea and vomiting. Genitourinary:  Negative for dysuria and frequency. Musculoskeletal:  Negative for arthralgias and back pain. Skin:  Negative for rash and wound. Neurological:  Negative for weakness and headaches. Hematological:  Negative for adenopathy. All other systems reviewed and are negative. Physical Exam  Vitals and nursing note reviewed. Constitutional:       Appearance: He is well-developed. HENT:      Head: Normocephalic and atraumatic. Eyes:      Pupils: Pupils are equal, round, and reactive to light. Cardiovascular:      Rate and Rhythm: Regular rhythm. Tachycardia present. Heart sounds: Normal heart sounds. No murmur heard. Pulmonary:      Effort: Pulmonary effort is normal. No tachypnea or respiratory distress. Breath sounds: Normal breath sounds. No wheezing or rales. Abdominal:      General: Bowel sounds are normal.      Palpations: Abdomen is soft. Tenderness: There is no abdominal tenderness.  There is no guarding or rebound. Musculoskeletal:      Cervical back: Normal range of motion and neck supple. Skin:     General: Skin is warm and dry. Neurological:      Mental Status: He is alert and oriented to person, place, and time. Cranial Nerves: No cranial nerve deficit. Coordination: Coordination normal.        Procedures     MDM       EKG: This EKG is signed and interpreted by me. Rate: 111  Rhythm: Sinus  Interpretation: no acute changes, non-specific EKG, and sinus tachycardia  Comparison: stable as compared to patient's most recent EKG     3:02 AM EDT  Into discussed results. Patient states he feels much better. Heart rate in the 80s. Discussed the fact it is unclear what caused his brief episodic shortness of breath and palpitations. However, currently things seem to have resolved. He is comfortable going home. He will follow-up closely with his family doctor. --------------------------------------------- PAST HISTORY ---------------------------------------------  Past Medical History:  has a past medical history of Arthritis, Asperger syndrome, Asthma, Hypertension, Obesity, and Sleep apnea. Past Surgical History:  has a past surgical history that includes Tonsillectomy; Adenoidectomy; [place] tooth extraction; Colonoscopy; and Colonoscopy (N/A, 7/29/2022). Social History:  reports that he has never smoked. He has never used smokeless tobacco. He reports current alcohol use of about 1.0 standard drink per week. He reports that he does not use drugs. Family History: family history is not on file. The patients home medications have been reviewed. Allergies: Patient has no known allergies.     -------------------------------------------------- RESULTS -------------------------------------------------  Labs:  Results for orders placed or performed during the hospital encounter of 08/25/22   CBC with Auto Differential   Result Value Ref Range    WBC 14.5 (H) 4.5 - 11.5 E9/L RBC 5.17 3.80 - 5.80 E12/L    Hemoglobin 14.9 12.5 - 16.5 g/dL    Hematocrit 45.8 37.0 - 54.0 %    MCV 88.6 80.0 - 99.9 fL    MCH 28.8 26.0 - 35.0 pg    MCHC 32.5 32.0 - 34.5 %    RDW 13.2 11.5 - 15.0 fL    Platelets 036 523 - 171 E9/L    MPV 9.6 7.0 - 12.0 fL    Neutrophils % 68.7 43.0 - 80.0 %    Immature Granulocytes % 1.9 0.0 - 5.0 %    Lymphocytes % 20.9 20.0 - 42.0 %    Monocytes % 7.5 2.0 - 12.0 %    Eosinophils % 0.6 0.0 - 6.0 %    Basophils % 0.4 0.0 - 2.0 %    Neutrophils Absolute 9.94 (H) 1.80 - 7.30 E9/L    Immature Granulocytes # 0.28 E9/L    Lymphocytes Absolute 3.02 1.50 - 4.00 E9/L    Monocytes Absolute 1.08 (H) 0.10 - 0.95 E9/L    Eosinophils Absolute 0.09 0.05 - 0.50 E9/L    Basophils Absolute 0.06 0.00 - 0.20 W0/K   Basic Metabolic Panel   Result Value Ref Range    Sodium 135 132 - 146 mmol/L    Potassium 3.9 3.5 - 5.0 mmol/L    Chloride 98 98 - 107 mmol/L    CO2 26 22 - 29 mmol/L    Anion Gap 11 7 - 16 mmol/L    Glucose 132 (H) 74 - 99 mg/dL    BUN 11 6 - 20 mg/dL    Creatinine 0.8 0.7 - 1.2 mg/dL    GFR Non-African American >60 >=60 mL/min/1.73    GFR African American >60     Calcium 9.3 8.6 - 10.2 mg/dL   Troponin   Result Value Ref Range    Troponin, High Sensitivity 7 0 - 11 ng/L       Radiology:  CTA PULMONARY W CONTRAST   Final Result   No evidence of pulmonary embolism or acute pulmonary abnormality.             ------------------------- NURSING NOTES AND VITALS REVIEWED ---------------------------  Date / Time Roomed:  8/25/2022  1:08 AM  ED Bed Assignment:  15/15    The nursing notes within the ED encounter and vital signs as below have been reviewed.    BP (!) 140/68   Pulse (!) 101   Temp 98 °F (36.7 °C) (Infrared)   Resp 26   SpO2 99%   Oxygen Saturation Interpretation: Normal      ------------------------------------------ PROGRESS NOTES ------------------------------------------  3:03 AM EDT  I have spoken with the patient and discussed todays results, in addition to providing

## 2022-08-26 LAB
EKG ATRIAL RATE: 111 BPM
EKG P AXIS: 27 DEGREES
EKG P-R INTERVAL: 134 MS
EKG Q-T INTERVAL: 334 MS
EKG QRS DURATION: 80 MS
EKG QTC CALCULATION (BAZETT): 454 MS
EKG R AXIS: 62 DEGREES
EKG T AXIS: 9 DEGREES
EKG VENTRICULAR RATE: 111 BPM

## 2022-11-09 ENCOUNTER — OFFICE VISIT (OUTPATIENT)
Dept: SURGERY | Age: 22
End: 2022-11-09
Payer: COMMERCIAL

## 2022-11-09 VITALS
TEMPERATURE: 98.2 F | HEIGHT: 67 IN | WEIGHT: 315 LBS | SYSTOLIC BLOOD PRESSURE: 156 MMHG | HEART RATE: 108 BPM | DIASTOLIC BLOOD PRESSURE: 106 MMHG | BODY MASS INDEX: 49.44 KG/M2

## 2022-11-09 DIAGNOSIS — K57.92 DIVERTICULITIS: ICD-10-CM

## 2022-11-09 DIAGNOSIS — E66.01 MORBID OBESITY (HCC): Primary | ICD-10-CM

## 2022-11-09 PROCEDURE — 1036F TOBACCO NON-USER: CPT | Performed by: SURGERY

## 2022-11-09 PROCEDURE — G8417 CALC BMI ABV UP PARAM F/U: HCPCS | Performed by: SURGERY

## 2022-11-09 PROCEDURE — G8484 FLU IMMUNIZE NO ADMIN: HCPCS | Performed by: SURGERY

## 2022-11-09 PROCEDURE — G8427 DOCREV CUR MEDS BY ELIG CLIN: HCPCS | Performed by: SURGERY

## 2022-11-09 PROCEDURE — 3074F SYST BP LT 130 MM HG: CPT | Performed by: SURGERY

## 2022-11-09 PROCEDURE — 3078F DIAST BP <80 MM HG: CPT | Performed by: SURGERY

## 2022-11-09 PROCEDURE — 99212 OFFICE O/P EST SF 10 MIN: CPT | Performed by: SURGERY

## 2022-11-09 RX ORDER — METRONIDAZOLE 500 MG/1
500 TABLET ORAL 3 TIMES DAILY
Qty: 30 TABLET | Refills: 0 | Status: SHIPPED | OUTPATIENT
Start: 2022-11-09 | End: 2022-11-19

## 2022-11-09 RX ORDER — IBUPROFEN 800 MG/1
TABLET ORAL
COMMUNITY
Start: 2022-08-02

## 2022-11-09 RX ORDER — TIZANIDINE 2 MG/1
TABLET ORAL
COMMUNITY
Start: 2022-08-03

## 2022-11-09 RX ORDER — CEFDINIR 300 MG/1
300 CAPSULE ORAL 2 TIMES DAILY
Qty: 20 CAPSULE | Refills: 0 | Status: SHIPPED | OUTPATIENT
Start: 2022-11-09 | End: 2022-11-19

## 2022-11-11 PROBLEM — K57.92 DIVERTICULITIS: Status: ACTIVE | Noted: 2022-11-11

## 2022-11-11 NOTE — PROGRESS NOTES
General Surgery Office Note  [facility]  Consandre SHAMIKA Rodriguez MD    Patient's Name/Date of Birth: Adele Cisneros. / 2000    Date: November 11, 2022     Surgeon: Zaida Rodriguez MD    Chief Complaint:   Chief Complaint   Patient presents with    Surgical Consult    Abdominal Pain    Rectal Bleeding    Nausea       Patient Active Problem List   Diagnosis    Other sprain of right middle finger, initial encounter    Tendinitis    Pain of finger of right hand    Perforated abdominal viscus    Diverticulitis of sigmoid colon    Morbid obesity ([address]    Perforation of sigmoid colon due to diverticulitis    Primary hypertension    Diverticulitis       Subjective: Reports intermittent left-sided abdominal pain, left upper quadrant pain. Objective:  BP (!) 156/106   Pulse (!) 108   Temp 98.2 °F (36.8 °C) (Temporal)   Ht 5' 7\" (1.702 m)   Wt (!) 350 lb (158.8 kg)   BMI 54.82 kg/m²   Labs:  No results for input(s): WBC, HGB, HCT in the last 72 hours. Invalid input(s): PLR  Lab Results   Component Value Date    CREATININE 0.8 08/25/2022    BUN 11 08/25/2022     08/25/2022    K 3.9 08/25/2022    CL 98 08/25/2022    CO2 26 08/25/2022     No results for input(s): LIPASE, AMYLASE in the last 72 hours. General appearance: AA, NAD  HEENT: NCAT, PERRLA, EOMI  Lungs: Clear, equal rise bilateral  Heart: Reg  Abdomen: soft, nondistended, tender mild left midabdomen and left upper quadrant  Skin: No lesions, incisions well healed  Psych: No distress, conversive, no hallucinations  : No ulcers or lesions  Rectal: No bleeding    A complete 10 system review was performed and are otherwise negative unless mentioned in the above HPI. Specific negatives are listed below but may not include all those reviewed.     General ROS: negative obtundation, AMS  ENT ROS: negative rhinorrhea, epistaxis  Allergy and Immunology ROS: negative itchy/watery eyes or nasal congestion  Hematological and Lymphatic ROS: negative spontaneous bleeding or bruising  Endocrine ROS: negative  lethargy, mood swings, palpitations or polydipsia/polyuria  Respiratory ROS: negative sputum changes, stridor, tachypnea or wheezing  Cardiovascular ROS: negative for - loss of consciousness, murmur or orthopnea  Gastrointestinal ROS: negative for - hematochezia or hematemesis  Genito-Urinary ROS: negative for -  genital discharge or hematuria  Musculoskeletal ROS: negative for - focal weakness, gangrene  Psych/Neuro ROS: negative for - visual or auditory hallucinations, suicidal ideation      Time spent reviewing past medical, surgical, social and family history, vitals, nursing assessment and images. Imaging: N/A    Assessment/Plan:  Emily Darby is a 20 y.o. male recent admission for diverticulitis.   Now with intermittent left-sided abdominal pain and diarrhea    Antibiotic course ordered Omnicef and Flagyl  Monitor symptoms and follow-up as needed  Repeat CT scan if symptoms persist despite antibiotics  Outpatient referral to weight loss center    Physician Signature: Electronically signed by Dr. Renny Pink  11/11/2022

## 2022-12-09 ENCOUNTER — APPOINTMENT (OUTPATIENT)
Dept: ULTRASOUND IMAGING | Age: 22
End: 2022-12-09
Payer: COMMERCIAL

## 2022-12-09 ENCOUNTER — APPOINTMENT (OUTPATIENT)
Dept: GENERAL RADIOLOGY | Age: 22
End: 2022-12-09
Payer: COMMERCIAL

## 2022-12-09 ENCOUNTER — HOSPITAL ENCOUNTER (EMERGENCY)
Age: 22
Discharge: HOME | End: 2022-12-09
Payer: COMMERCIAL

## 2022-12-09 VITALS
TEMPERATURE: 98 F | BODY MASS INDEX: 49.44 KG/M2 | SYSTOLIC BLOOD PRESSURE: 134 MMHG | OXYGEN SATURATION: 98 % | WEIGHT: 315 LBS | HEIGHT: 67 IN | DIASTOLIC BLOOD PRESSURE: 91 MMHG | HEART RATE: 88 BPM | RESPIRATION RATE: 18 BRPM

## 2022-12-09 DIAGNOSIS — S86.912A STRAIN OF LEFT KNEE, INITIAL ENCOUNTER: Primary | ICD-10-CM

## 2022-12-09 PROCEDURE — 93971 EXTREMITY STUDY: CPT

## 2022-12-09 PROCEDURE — 73562 X-RAY EXAM OF KNEE 3: CPT

## 2022-12-09 PROCEDURE — 6370000000 HC RX 637 (ALT 250 FOR IP): Performed by: PHYSICIAN ASSISTANT

## 2022-12-09 PROCEDURE — 99284 EMERGENCY DEPT VISIT MOD MDM: CPT

## 2022-12-09 RX ORDER — NAPROXEN 500 MG/1
500 TABLET ORAL 2 TIMES DAILY WITH MEALS
Qty: 10 TABLET | Refills: 0 | Status: SHIPPED | OUTPATIENT
Start: 2022-12-09 | End: 2022-12-14

## 2022-12-09 RX ORDER — NAPROXEN 500 MG/1
500 TABLET ORAL ONCE
Status: COMPLETED | OUTPATIENT
Start: 2022-12-09 | End: 2022-12-09

## 2022-12-09 RX ADMIN — NAPROXEN 500 MG: 500 TABLET ORAL at 16:51

## 2022-12-09 ASSESSMENT — PAIN DESCRIPTION - LOCATION: LOCATION: KNEE

## 2022-12-09 ASSESSMENT — PAIN DESCRIPTION - ORIENTATION: ORIENTATION: RIGHT

## 2022-12-09 ASSESSMENT — PAIN SCALES - GENERAL: PAINLEVEL_OUTOF10: 5

## 2022-12-09 NOTE — ED PROVIDER NOTES
[id number]  Department of Emergency Medicine   ED  Encounter Note  Admit Date/RoomTime: 2022  4:52 PM  ED Room:     NAME: Thomas Nieto : 2000  MRN: [id number]     Chief Complaint:  Knee Pain (No known injury, pain for about one month, pain getting worse. )    History of Present Illness       Thomas Nieto is a 20 y.o. old male who presents to the emergency department by private vehicle, for non-traumatic pain located diffusely and swelling to left knee  which occured 1 month(s) prior to arrival.  The complaint is due to no known cause. Since onset the symptoms have been gradually worsening. Patient has no prior history of pain/injury with regards to today's visit. His pain is aggraveated by standing or walking and relieved by nothing. His weight bearing ability is: difficult secondary to discomfort. He denies any head injury, headache, loss of consciousness, confusion, dizziness, neck pain, chest pain, abdominal pain, back pain, numbness, weakness, blurred vision, nausea, vomiting, fever, chills, wounds, or rash. Tetanus Status: up to date. ROS   Pertinent positives and negatives are stated within HPI, all other systems reviewed and are negative. Past Medical History:  has a past medical history of Arthritis, Asperger syndrome, Asthma, Hypertension, Obesity, and Sleep apnea. Surgical History:  has a past surgical history that includes Tonsillectomy; Adenoidectomy; [place] tooth extraction; Colonoscopy; and Colonoscopy (N/A, 2022). Social History:  reports that he has never smoked. He has never used smokeless tobacco. He reports current alcohol use of about 1.0 standard drink per week. He reports that he does not use drugs. Family History: family history is not on file. Allergies: Patient has no known allergies.     Physical Exam   Oxygen Saturation Interpretation: Normal.        ED Triage Vitals [22 1628]   BP Temp Temp src Pulse Resp SpO2 Height Weight   -- 98 °F (36.7 °C) -- -- -- -- -- --         Constitutional:  Alert, development consistent with age. Neck:  Normal ROM. Supple. Physical Exam  Left Knee: lateral, popliteal fossa            Tenderness:  moderate. .              Swelling/Effusion: Mild. Deformity: no deformity observed/palpated. ROM: full range with pain. Skin:  no wounds or erythema. Drawer's:  Unable to Assess. Lachman's: Unable to Assess. Apley's: Unable to Assess. Arturo's: Unable to Assess. Valgus/Varus Stress: Negative. Crepitus: Negative. Hip:            Tenderness:  none. Swelling: None. Deformity: no.              ROM: full range of motion. Skin:  no wounds, erythema, or swelling. Joint(s) Above/Below: calf. Tenderness:  mild. Swelling: No.              Deformity: no deformity observed/palpated. ROM: full range with pain. Skin:  no wounds, erythema, or swelling. Neurovascular: Motor deficit: none. Sensory deficit: none. Pulse deficit: none. Capillary refill: normal.  Gait:  limp due to affected limb. Lymphatics: No lymphangitis or adenopathy noted. Neurological:  Oriented. Motor functions intact. Lab / Imaging Results   (All laboratory and radiology results have been personally reviewed by myself)  Labs:  No results found for this visit on 12/09/22. Imaging: All Radiology results interpreted by Radiologist unless otherwise noted. XR KNEE LEFT (3 VIEWS)   Final Result   No acute abnormality of the knee. US DUP LOWER EXTREMITY LEFT JUMANA   Final Result   No evidence of DVT in the left lower extremity.            ED Course / Medical Decision Making     Medications   naproxen (NAPROSYN) tablet 500 mg (500 mg Oral Given 12/9/22 5520) Consult(s):   None    Procedure(s):   None. MDM:     Imaging was obtained based on low suspicion for fracture / bony abnormality, joint effusion, possible DVT as per history/physical findings. X-ray imaging of the left knee demonstrated no acute abnormality about the left knee as read by radiology. Duplex ultrasound of left lower extremity demonstrated no evidence of DVT as read by radiology. All results discussed with patient prior to disposition all questions were answered. Patient has most likely sustained a left knee strain which is self-limiting. He was placed in Ace wrap via nursing staff. He was given naproxen and sent continuation of prescription to pharmacy of choice for utilization as directed. He was given orthopedic follow-up to report to within a week should symptoms fail to improve. He is appropriate for discharge home as he is alert and oriented, no acute distress, afebrile nontachycardic and nonhypoxic. Patient understandable and agreeable to plan. Plan of Care/Counseling:  SARA Niño reviewed today's visit with the patient in addition to providing specific details for the plan of care and counseling regarding the diagnosis and prognosis. Questions are answered at this time and are agreeable with the plan. Assessment      1. Strain of left knee, initial encounter      Plan   Discharged home. Patient condition is good    New Medications     Discharge Medication List as of 12/9/2022  6:33 PM        START taking these medications    Details   naproxen (NAPROSYN) 500 MG tablet Take 1 tablet by mouth 2 times daily (with meals) for 5 days, Disp-10 tablet, R-0Normal           Electronically signed by SARA Niño   DD: 12/9/22  **This report was transcribed using voice recognition software. Every effort was made to ensure accuracy; however, inadvertent computerized transcription errors may be present.   END OF ED PROVIDER NOTE       SARA Niño  12/09/22 Fannie Campos

## 2022-12-09 NOTE — ED NOTES
Discussed with the patient and all questioned fully answered. He will call me if any problems arise.          Darius Shields RN  12/09/22 [id number]

## 2022-12-21 ENCOUNTER — TELEPHONE (OUTPATIENT)
Dept: BARIATRICS/WEIGHT MGMT | Age: 22
End: 2022-12-21

## 2023-01-19 ENCOUNTER — TELEPHONE (OUTPATIENT)
Dept: BARIATRICS/WEIGHT MGMT | Age: 23
End: 2023-01-19

## 2023-02-07 ENCOUNTER — HOSPITAL ENCOUNTER (EMERGENCY)
Age: 23
Discharge: HOME | End: 2023-02-07
Attending: EMERGENCY MEDICINE
Payer: COMMERCIAL

## 2023-02-07 ENCOUNTER — APPOINTMENT (OUTPATIENT)
Dept: CT IMAGING | Age: 23
End: 2023-02-07
Payer: COMMERCIAL

## 2023-02-07 VITALS
SYSTOLIC BLOOD PRESSURE: 143 MMHG | OXYGEN SATURATION: 99 % | RESPIRATION RATE: 20 BRPM | HEART RATE: 88 BPM | WEIGHT: 315 LBS | BODY MASS INDEX: 54.59 KG/M2 | DIASTOLIC BLOOD PRESSURE: 81 MMHG | TEMPERATURE: 99 F

## 2023-02-07 DIAGNOSIS — K57.32 DIVERTICULITIS OF COLON: Primary | ICD-10-CM

## 2023-02-07 LAB
ALBUMIN SERPL-MCNC: 3.9 G/DL (ref 3.5–5.2)
ALP BLD-CCNC: 115 U/L (ref 40–129)
ALT SERPL-CCNC: 26 U/L (ref 0–40)
ANION GAP SERPL CALCULATED.3IONS-SCNC: 11 MMOL/L (ref 7–16)
AST SERPL-CCNC: 24 U/L (ref 0–39)
BACTERIA: ABNORMAL /HPF
BASOPHILS ABSOLUTE: 0.05 E9/L (ref 0–0.2)
BASOPHILS RELATIVE PERCENT: 0.3 % (ref 0–2)
BILIRUB SERPL-MCNC: 0.3 MG/DL (ref 0–1.2)
BILIRUBIN URINE: NEGATIVE
BLOOD, URINE: NEGATIVE
BUN BLDV-MCNC: 12 MG/DL (ref 6–20)
CALCIUM SERPL-MCNC: 9.1 MG/DL (ref 8.6–10.2)
CHLORIDE BLD-SCNC: 102 MMOL/L (ref 98–107)
CLARITY: CLEAR
CO2: 26 MMOL/L (ref 22–29)
COLOR: YELLOW
CREAT SERPL-MCNC: 0.8 MG/DL (ref 0.7–1.2)
CRYSTALS, UA: ABNORMAL /HPF
EOSINOPHILS ABSOLUTE: 0.05 E9/L (ref 0.05–0.5)
EOSINOPHILS RELATIVE PERCENT: 0.3 % (ref 0–6)
GFR SERPL CREATININE-BSD FRML MDRD: >60 ML/MIN/1.73
GLUCOSE BLD-MCNC: 94 MG/DL (ref 74–99)
GLUCOSE URINE: NEGATIVE MG/DL
HCT VFR BLD CALC: 47.4 % (ref 37–54)
HEMOGLOBIN: 14.9 G/DL (ref 12.5–16.5)
IMMATURE GRANULOCYTES #: 0.2 E9/L
IMMATURE GRANULOCYTES %: 1.1 % (ref 0–5)
KETONES, URINE: NEGATIVE MG/DL
LACTIC ACID: 0.9 MMOL/L (ref 0.5–2.2)
LEUKOCYTE ESTERASE, URINE: NEGATIVE
LIPASE: 16 U/L (ref 13–60)
LYMPHOCYTES ABSOLUTE: 2.68 E9/L (ref 1.5–4)
LYMPHOCYTES RELATIVE PERCENT: 14.5 % (ref 20–42)
MCH RBC QN AUTO: 27.9 PG (ref 26–35)
MCHC RBC AUTO-ENTMCNC: 31.4 % (ref 32–34.5)
MCV RBC AUTO: 88.8 FL (ref 80–99.9)
MONOCYTES ABSOLUTE: 1.26 E9/L (ref 0.1–0.95)
MONOCYTES RELATIVE PERCENT: 6.8 % (ref 2–12)
NEUTROPHILS ABSOLUTE: 14.23 E9/L (ref 1.8–7.3)
NEUTROPHILS RELATIVE PERCENT: 77 % (ref 43–80)
NITRITE, URINE: NEGATIVE
PDW BLD-RTO: 13.2 FL (ref 11.5–15)
PH UA: 5 (ref 5–9)
PLATELET # BLD: 368 E9/L (ref 130–450)
PMV BLD AUTO: 9.3 FL (ref 7–12)
POTASSIUM REFLEX MAGNESIUM: 4.1 MMOL/L (ref 3.5–5)
PROTEIN UA: NEGATIVE MG/DL
RBC # BLD: 5.34 E12/L (ref 3.8–5.8)
RBC UA: ABNORMAL /HPF (ref 0–2)
SODIUM BLD-SCNC: 139 MMOL/L (ref 132–146)
SPECIFIC GRAVITY UA: >=1.03 (ref 1–1.03)
TOTAL PROTEIN: 7.8 G/DL (ref 6.4–8.3)
TROPONIN, HIGH SENSITIVITY: <6 NG/L (ref 0–11)
UROBILINOGEN, URINE: 0.2 E.U./DL
WBC # BLD: 18.5 E9/L (ref 4.5–11.5)
WBC UA: ABNORMAL /HPF (ref 0–5)

## 2023-02-07 PROCEDURE — 80053 COMPREHEN METABOLIC PANEL: CPT

## 2023-02-07 PROCEDURE — 99285 EMERGENCY DEPT VISIT HI MDM: CPT

## 2023-02-07 PROCEDURE — 83605 ASSAY OF LACTIC ACID: CPT

## 2023-02-07 PROCEDURE — 6360000004 HC RX CONTRAST MEDICATION: Performed by: RADIOLOGY

## 2023-02-07 PROCEDURE — 2580000003 HC RX 258: Performed by: STUDENT IN AN ORGANIZED HEALTH CARE EDUCATION/TRAINING PROGRAM

## 2023-02-07 PROCEDURE — 93005 ELECTROCARDIOGRAM TRACING: CPT | Performed by: STUDENT IN AN ORGANIZED HEALTH CARE EDUCATION/TRAINING PROGRAM

## 2023-02-07 PROCEDURE — 36415 COLL VENOUS BLD VENIPUNCTURE: CPT

## 2023-02-07 PROCEDURE — 84484 ASSAY OF TROPONIN QUANT: CPT

## 2023-02-07 PROCEDURE — 81001 URINALYSIS AUTO W/SCOPE: CPT

## 2023-02-07 PROCEDURE — 6360000002 HC RX W HCPCS: Performed by: STUDENT IN AN ORGANIZED HEALTH CARE EDUCATION/TRAINING PROGRAM

## 2023-02-07 PROCEDURE — 6370000000 HC RX 637 (ALT 250 FOR IP): Performed by: STUDENT IN AN ORGANIZED HEALTH CARE EDUCATION/TRAINING PROGRAM

## 2023-02-07 PROCEDURE — 96374 THER/PROPH/DIAG INJ IV PUSH: CPT

## 2023-02-07 PROCEDURE — 85025 COMPLETE CBC W/AUTO DIFF WBC: CPT

## 2023-02-07 PROCEDURE — 83690 ASSAY OF LIPASE: CPT

## 2023-02-07 PROCEDURE — 74177 CT ABD & PELVIS W/CONTRAST: CPT

## 2023-02-07 RX ORDER — ONDANSETRON 4 MG/1
4 TABLET, ORALLY DISINTEGRATING ORAL ONCE
Status: COMPLETED | OUTPATIENT
Start: 2023-02-07 | End: 2023-02-07

## 2023-02-07 RX ORDER — CEFDINIR 300 MG/1
300 CAPSULE ORAL 2 TIMES DAILY
Qty: 14 CAPSULE | Refills: 0 | Status: SHIPPED | OUTPATIENT
Start: 2023-02-07 | End: 2023-02-14

## 2023-02-07 RX ORDER — MORPHINE SULFATE 4 MG/ML
4 INJECTION, SOLUTION INTRAMUSCULAR; INTRAVENOUS ONCE
Status: COMPLETED | OUTPATIENT
Start: 2023-02-07 | End: 2023-02-07

## 2023-02-07 RX ORDER — METRONIDAZOLE 500 MG/1
500 TABLET ORAL 2 TIMES DAILY
Qty: 14 TABLET | Refills: 0 | Status: SHIPPED | OUTPATIENT
Start: 2023-02-07 | End: 2023-02-14

## 2023-02-07 RX ORDER — 0.9 % SODIUM CHLORIDE 0.9 %
1000 INTRAVENOUS SOLUTION INTRAVENOUS ONCE
Status: COMPLETED | OUTPATIENT
Start: 2023-02-07 | End: 2023-02-07

## 2023-02-07 RX ADMIN — SODIUM CHLORIDE 1000 ML: 9 INJECTION, SOLUTION INTRAVENOUS at 18:46

## 2023-02-07 RX ADMIN — ONDANSETRON 4 MG: 4 TABLET, ORALLY DISINTEGRATING ORAL at 19:14

## 2023-02-07 RX ADMIN — IOPAMIDOL 70 ML: 755 INJECTION, SOLUTION INTRAVENOUS at 19:39

## 2023-02-07 RX ADMIN — MORPHINE SULFATE 4 MG: 4 INJECTION, SOLUTION INTRAMUSCULAR; INTRAVENOUS at 19:29

## 2023-02-07 ASSESSMENT — LIFESTYLE VARIABLES
HOW MANY STANDARD DRINKS CONTAINING ALCOHOL DO YOU HAVE ON A TYPICAL DAY: PATIENT DOES NOT DRINK
HOW OFTEN DO YOU HAVE A DRINK CONTAINING ALCOHOL: MONTHLY OR LESS

## 2023-02-07 ASSESSMENT — PAIN DESCRIPTION - FREQUENCY: FREQUENCY: CONTINUOUS

## 2023-02-07 ASSESSMENT — PAIN - FUNCTIONAL ASSESSMENT: PAIN_FUNCTIONAL_ASSESSMENT: 0-10

## 2023-02-07 ASSESSMENT — PAIN DESCRIPTION - LOCATION
LOCATION: ABDOMEN;BACK
LOCATION: ABDOMEN;FLANK

## 2023-02-07 ASSESSMENT — PAIN DESCRIPTION - DESCRIPTORS: DESCRIPTORS: BURNING

## 2023-02-07 ASSESSMENT — PAIN DESCRIPTION - ORIENTATION
ORIENTATION: LEFT;LOWER
ORIENTATION: LOWER;LEFT

## 2023-02-07 ASSESSMENT — PAIN SCALES - GENERAL
PAINLEVEL_OUTOF10: 8
PAINLEVEL_OUTOF10: 8

## 2023-02-07 ASSESSMENT — PAIN DESCRIPTION - PAIN TYPE: TYPE: ACUTE PAIN

## 2023-02-08 ENCOUNTER — HOSPITAL ENCOUNTER (EMERGENCY)
Age: 23
Discharge: HOME | End: 2023-02-09
Attending: EMERGENCY MEDICINE
Payer: COMMERCIAL

## 2023-02-08 ENCOUNTER — APPOINTMENT (OUTPATIENT)
Dept: CT IMAGING | Age: 23
End: 2023-02-08
Payer: COMMERCIAL

## 2023-02-08 VITALS
DIASTOLIC BLOOD PRESSURE: 88 MMHG | SYSTOLIC BLOOD PRESSURE: 145 MMHG | RESPIRATION RATE: 18 BRPM | HEART RATE: 88 BPM | OXYGEN SATURATION: 96 % | WEIGHT: 315 LBS | BODY MASS INDEX: 55.13 KG/M2 | TEMPERATURE: 98.2 F

## 2023-02-08 DIAGNOSIS — K57.32 DIVERTICULITIS OF COLON: Primary | ICD-10-CM

## 2023-02-08 LAB
ALBUMIN SERPL-MCNC: 3.9 G/DL (ref 3.5–5.2)
ALP BLD-CCNC: 99 U/L (ref 40–129)
ALT SERPL-CCNC: 21 U/L (ref 0–40)
ANION GAP SERPL CALCULATED.3IONS-SCNC: 10 MMOL/L (ref 7–16)
AST SERPL-CCNC: 21 U/L (ref 0–39)
BASOPHILS ABSOLUTE: 0.04 E9/L (ref 0–0.2)
BASOPHILS RELATIVE PERCENT: 0.3 % (ref 0–2)
BILIRUB SERPL-MCNC: 0.3 MG/DL (ref 0–1.2)
BUN BLDV-MCNC: 10 MG/DL (ref 6–20)
CALCIUM SERPL-MCNC: 8.8 MG/DL (ref 8.6–10.2)
CHLORIDE BLD-SCNC: 101 MMOL/L (ref 98–107)
CO2: 28 MMOL/L (ref 22–29)
CREAT SERPL-MCNC: 0.8 MG/DL (ref 0.7–1.2)
EKG ATRIAL RATE: 106 BPM
EKG P AXIS: 16 DEGREES
EKG P-R INTERVAL: 132 MS
EKG Q-T INTERVAL: 342 MS
EKG QRS DURATION: 80 MS
EKG QTC CALCULATION (BAZETT): 454 MS
EKG R AXIS: 56 DEGREES
EKG T AXIS: 3 DEGREES
EKG VENTRICULAR RATE: 106 BPM
EOSINOPHILS ABSOLUTE: 0.18 E9/L (ref 0.05–0.5)
EOSINOPHILS RELATIVE PERCENT: 1.2 % (ref 0–6)
GFR SERPL CREATININE-BSD FRML MDRD: >60 ML/MIN/1.73
GLUCOSE BLD-MCNC: 105 MG/DL (ref 74–99)
HCT VFR BLD CALC: 44.8 % (ref 37–54)
HEMOGLOBIN: 14 G/DL (ref 12.5–16.5)
IMMATURE GRANULOCYTES #: 0.24 E9/L
IMMATURE GRANULOCYTES %: 1.7 % (ref 0–5)
LACTIC ACID: 0.9 MMOL/L (ref 0.5–2.2)
LYMPHOCYTES ABSOLUTE: 3.09 E9/L (ref 1.5–4)
LYMPHOCYTES RELATIVE PERCENT: 21.3 % (ref 20–42)
MCH RBC QN AUTO: 28.3 PG (ref 26–35)
MCHC RBC AUTO-ENTMCNC: 31.3 % (ref 32–34.5)
MCV RBC AUTO: 90.5 FL (ref 80–99.9)
MONOCYTES ABSOLUTE: 1.02 E9/L (ref 0.1–0.95)
MONOCYTES RELATIVE PERCENT: 7 % (ref 2–12)
NEUTROPHILS ABSOLUTE: 9.95 E9/L (ref 1.8–7.3)
NEUTROPHILS RELATIVE PERCENT: 68.5 % (ref 43–80)
PDW BLD-RTO: 13.2 FL (ref 11.5–15)
PLATELET # BLD: 354 E9/L (ref 130–450)
PMV BLD AUTO: 9.4 FL (ref 7–12)
POTASSIUM SERPL-SCNC: 3.8 MMOL/L (ref 3.5–5)
RBC # BLD: 4.95 E12/L (ref 3.8–5.8)
SODIUM BLD-SCNC: 139 MMOL/L (ref 132–146)
TOTAL PROTEIN: 7.4 G/DL (ref 6.4–8.3)
WBC # BLD: 14.5 E9/L (ref 4.5–11.5)

## 2023-02-08 PROCEDURE — 80053 COMPREHEN METABOLIC PANEL: CPT

## 2023-02-08 PROCEDURE — 96374 THER/PROPH/DIAG INJ IV PUSH: CPT

## 2023-02-08 PROCEDURE — 83605 ASSAY OF LACTIC ACID: CPT

## 2023-02-08 PROCEDURE — 2580000003 HC RX 258: Performed by: STUDENT IN AN ORGANIZED HEALTH CARE EDUCATION/TRAINING PROGRAM

## 2023-02-08 PROCEDURE — 85025 COMPLETE CBC W/AUTO DIFF WBC: CPT

## 2023-02-08 PROCEDURE — 93010 ELECTROCARDIOGRAM REPORT: CPT | Performed by: INTERNAL MEDICINE

## 2023-02-08 PROCEDURE — 6360000002 HC RX W HCPCS: Performed by: STUDENT IN AN ORGANIZED HEALTH CARE EDUCATION/TRAINING PROGRAM

## 2023-02-08 PROCEDURE — 6360000004 HC RX CONTRAST MEDICATION: Performed by: RADIOLOGY

## 2023-02-08 PROCEDURE — 36415 COLL VENOUS BLD VENIPUNCTURE: CPT

## 2023-02-08 PROCEDURE — 99285 EMERGENCY DEPT VISIT HI MDM: CPT

## 2023-02-08 PROCEDURE — 74176 CT ABD & PELVIS W/O CONTRAST: CPT

## 2023-02-08 RX ORDER — ONDANSETRON 2 MG/ML
4 INJECTION INTRAMUSCULAR; INTRAVENOUS ONCE
Status: COMPLETED | OUTPATIENT
Start: 2023-02-08 | End: 2023-02-08

## 2023-02-08 RX ORDER — 0.9 % SODIUM CHLORIDE 0.9 %
1000 INTRAVENOUS SOLUTION INTRAVENOUS ONCE
Status: COMPLETED | OUTPATIENT
Start: 2023-02-08 | End: 2023-02-09

## 2023-02-08 RX ORDER — ONDANSETRON 4 MG/1
4 TABLET, ORALLY DISINTEGRATING ORAL 3 TIMES DAILY PRN
Qty: 21 TABLET | Refills: 0 | Status: SHIPPED | OUTPATIENT
Start: 2023-02-08

## 2023-02-08 RX ADMIN — ONDANSETRON 4 MG: 2 INJECTION INTRAMUSCULAR; INTRAVENOUS at 21:43

## 2023-02-08 RX ADMIN — IOPAMIDOL 18 ML: 755 INJECTION, SOLUTION INTRAVENOUS at 22:29

## 2023-02-08 RX ADMIN — SODIUM CHLORIDE 1000 ML: 9 INJECTION, SOLUTION INTRAVENOUS at 21:42

## 2023-02-08 ASSESSMENT — PAIN DESCRIPTION - LOCATION: LOCATION: ABDOMEN;FLANK

## 2023-02-08 ASSESSMENT — PAIN DESCRIPTION - ORIENTATION: ORIENTATION: LEFT;LOWER

## 2023-02-08 ASSESSMENT — PAIN SCALES - GENERAL: PAINLEVEL_OUTOF10: 9

## 2023-02-08 ASSESSMENT — LIFESTYLE VARIABLES: HOW MANY STANDARD DRINKS CONTAINING ALCOHOL DO YOU HAVE ON A TYPICAL DAY: PATIENT DOES NOT DRINK

## 2023-02-08 ASSESSMENT — PAIN - FUNCTIONAL ASSESSMENT: PAIN_FUNCTIONAL_ASSESSMENT: 0-10

## 2023-02-08 NOTE — ED PROVIDER NOTES
Department of Emergency Medicine   ED  Provider Note  Admit Date/RoomTime: 2/7/2023  5:20 PM  ED Room: 25/          History of Present Illness:  2/7/23, Time: 8:56 PM EST  Chief Complaint   Patient presents with    Abdominal Pain    Flank Pain     HX UTI DIVERTICULTIS/ PAIN STARTED THIS AM/ NAUSEA/ DENIES Jadyn Cummings. is a 20 y.o. male presenting to the ED for left-sided abdominal pain. Patient states that it started this morning. He does have some nausea that accompanies it. He states that he has had this previously before and was related to diverticulitis. Patient states that he has had some looser stools of late as well. He otherwise denies any fevers, chest pain, shortness of breath, emesis, dysuria, or blood in his stool. Review of Systems   All other systems reviewed and are negative.       --------------------------------------------- PAST HISTORY ---------------------------------------------  Past Medical History:  has a past medical history of Arthritis, Asperger syndrome, Asthma, Hypertension, Obesity, and Sleep apnea. Past Surgical History:  has a past surgical history that includes Tonsillectomy; Adenoidectomy; [place] tooth extraction; Colonoscopy; and Colonoscopy (N/A, 7/29/2022). Social History:  reports that he has never smoked. He has never used smokeless tobacco. He reports current alcohol use of about 1.0 standard drink per week. He reports that he does not use drugs. Family History: family history is not on file. . Unless otherwise noted, family history is non contributory    The patients home medications have been reviewed. Allergies: Patient has no known allergies. ---------------------------------------------------PHYSICAL EXAM--------------------------------------    Physical Exam  Vitals and nursing note reviewed. Constitutional:       General: He is not in acute distress. Appearance: He is well-developed. He is obese.    HENT: Head: Normocephalic and atraumatic. Eyes:      Conjunctiva/sclera: Conjunctivae normal.   Cardiovascular:      Rate and Rhythm: Normal rate and regular rhythm. Heart sounds: Normal heart sounds. No murmur heard. Pulmonary:      Effort: Pulmonary effort is normal. No respiratory distress. Breath sounds: Normal breath sounds. No wheezing or rales. Abdominal:      General: There is no distension. Palpations: Abdomen is soft. Tenderness: There is abdominal tenderness in the left upper quadrant and left lower quadrant. There is no right CVA tenderness, guarding or rebound. Musculoskeletal:         General: No tenderness or deformity. Cervical back: Normal range of motion and neck supple. Skin:     General: Skin is warm and dry. Neurological:      Mental Status: He is alert and oriented to person, place, and time. Cranial Nerves: No cranial nerve deficit. Coordination: Coordination normal.          -------------------------------------------------- RESULTS -------------------------------------------------  I have personally reviewed all laboratory and imaging results for this patient. Results are listed below.      LABS: (Lab results interpreted by me)  Results for orders placed or performed during the hospital encounter of 02/07/23   CBC with Auto Differential   Result Value Ref Range    WBC 18.5 (H) 4.5 - 11.5 E9/L    RBC 5.34 3.80 - 5.80 E12/L    Hemoglobin 14.9 12.5 - 16.5 g/dL    Hematocrit 47.4 37.0 - 54.0 %    MCV 88.8 80.0 - 99.9 fL    MCH 27.9 26.0 - 35.0 pg    MCHC 31.4 (L) 32.0 - 34.5 %    RDW 13.2 11.5 - 15.0 fL    Platelets 962 114 - 430 E9/L    MPV 9.3 7.0 - 12.0 fL    Neutrophils % 77.0 43.0 - 80.0 %    Immature Granulocytes % 1.1 0.0 - 5.0 %    Lymphocytes % 14.5 (L) 20.0 - 42.0 %    Monocytes % 6.8 2.0 - 12.0 %    Eosinophils % 0.3 0.0 - 6.0 %    Basophils % 0.3 0.0 - 2.0 %    Neutrophils Absolute 14.23 (H) 1.80 - 7.30 E9/L    Immature Granulocytes # 0.20 E9/L Lymphocytes Absolute 2.68 1.50 - 4.00 E9/L    Monocytes Absolute 1.26 (H) 0.10 - 0.95 E9/L    Eosinophils Absolute 0.05 0.05 - 0.50 E9/L    Basophils Absolute 0.05 0.00 - 0.20 E9/L   Comprehensive Metabolic Panel w/ Reflex to MG   Result Value Ref Range    Sodium 139 132 - 146 mmol/L    Potassium reflex Magnesium 4.1 3.5 - 5.0 mmol/L    Chloride 102 98 - 107 mmol/L    CO2 26 22 - 29 mmol/L    Anion Gap 11 7 - 16 mmol/L    Glucose 94 74 - 99 mg/dL    BUN 12 6 - 20 mg/dL    Creatinine 0.8 0.7 - 1.2 mg/dL    Est, Glom Filt Rate >60 >=60 mL/min/1.73    Calcium 9.1 8.6 - 10.2 mg/dL    Total Protein 7.8 6.4 - 8.3 g/dL    Albumin 3.9 3.5 - 5.2 g/dL    Total Bilirubin 0.3 0.0 - 1.2 mg/dL    Alkaline Phosphatase 115 40 - 129 U/L    ALT 26 0 - 40 U/L    AST 24 0 - 39 U/L   Troponin   Result Value Ref Range    Troponin, High Sensitivity <6 0 - 11 ng/L   Lipase   Result Value Ref Range    Lipase 16 13 - 60 U/L   Lactic Acid   Result Value Ref Range    Lactic Acid 0.9 0.5 - 2.2 mmol/L   Urinalysis with Microscopic   Result Value Ref Range    Color, UA Yellow Straw/Yellow    Clarity, UA Clear Clear    Glucose, Ur Negative Negative mg/dL    Bilirubin Urine Negative Negative    Ketones, Urine Negative Negative mg/dL    Specific Gravity, UA >=1.030 1.005 - 1.030    Blood, Urine Negative Negative    pH, UA 5.0 5.0 - 9.0    Protein, UA Negative Negative mg/dL    Urobilinogen, Urine 0.2 <2.0 E.U./dL    Nitrite, Urine Negative Negative    Leukocyte Esterase, Urine Negative Negative    WBC, UA 0-1 0 - 5 /HPF    RBC, UA NONE 0 - 2 /HPF    Bacteria, UA NONE SEEN None Seen /HPF    Crystals, UA Moderate (A) None Seen /HPF   EKG 12 Lead   Result Value Ref Range    Ventricular Rate 106 BPM    Atrial Rate 106 BPM    P-R Interval 132 ms    QRS Duration 80 ms    Q-T Interval 342 ms    QTc Calculation (Bazett) 454 ms    P Axis 16 degrees    R Axis 56 degrees    T Axis 3 degrees   ,       RADIOLOGY:  Interpreted by Radiologist unless otherwise specified  CT ABDOMEN PELVIS W IV CONTRAST Additional Contrast? None   Final Result   Thickening of the proximal sigmoid colon with adjacent inflammatory stranding   consistent with uncomplicated diverticulitis.                      ------------------------- NURSING NOTES AND VITALS REVIEWED ---------------------------   The nursing notes within the ED encounter and vital signs as below have been reviewed by myself  BP (!) 143/81   Pulse 88   Temp 99 °F (37.2 °C)   Resp 20   Wt (!) 348 lb 9 oz (158.1 kg)   SpO2 99%   BMI 54.59 kg/m²     Oxygen Saturation Interpretation: Normal    The cardiac monitor revealed normal sinus with a heart rate in the 80s as interpreted by me. The cardiac monitor was ordered secondary to the patient's heart rate and to monitor the patient for dysrhythmia. CPT 19924    The patients available past medical records and past encounters were reviewed. ------------------------------ ED COURSE/MEDICAL DECISION MAKING----------------------  Medications   ondansetron (ZOFRAN-ODT) disintegrating tablet 4 mg (4 mg Oral Given 2/7/23 1914)   morphine injection 4 mg (4 mg IntraVENous Given 2/7/23 1929)   0.9 % sodium chloride bolus (0 mLs IntraVENous Stopped 2/7/23 2143)   iopamidol (ISOVUE-370) 76 % injection 70 mL (70 mLs IntraVENous Given 2/7/23 1939)          Medical Decision Making  Differential included but not limited to diverticulitis versus kidney stone versus pancreatitis versus ACS    Amount and/or Complexity of Data Reviewed  Labs: ordered. Decision-making details documented in ED Course. Radiology: ordered and independent interpretation performed. Decision-making details documented in ED Course. ECG/medicine tests: ordered and independent interpretation performed. Decision-making details documented in ED Course. Risk  Prescription drug management. ED Course as of 02/08/23 0041 Tue Feb 07, 2023 2117 Patient presents with abdominal pain.   Morphine given for pain.  Zofran given for nausea. CBC did show a leukocytosis to 18.5. Patient was initially tachycardic but did improve with fluids. CMP was unremarkable with elevations in his transaminases. Low concern for acute cholecystitis. Urinalysis was unconvincing of UTI. Lipase was negative. Low concern for pancreatitis. EKG did not meet STEMI criteria. Troponin was negative. Repeat not obtained due to length of patient's symptoms. CT of the abdomen pelvis was obtained and did show concern for diverticulitis. Vitals are stable. Patient be discharged home with prescriptions for Omnicef and Flagyl and advised to follow-up with his PCP for further evaluation care. [BB]   2117 EKG shows sinus tachycardia at a ventricular rate of 106 bpm.  Normal axis. Does not meet STEMI criteria. Comparable to previous EKG on 8/25/2022. As interpreted by myself the ED physician [BB]      ED Course User Index  [BB] Rolando Ramírez DO        Re-Evaluations:  Patient was reevaluted and was improved. This patient's ED course included: a personal history and physicial examination, re-evaluation prior to disposition, IV medications, and cardiac monitoring    This patient has remained hemodynamically stable during their ED course. Consultations:  None    Counseling: The emergency provider has spoken with the patient and discussed todays results, in addition to providing specific details for the plan of care and counseling regarding the diagnosis and prognosis. Questions are answered at this time and they are agreeable with the plan.       --------------------------------- IMPRESSION AND DISPOSITION ---------------------------------    IMPRESSION  1. Diverticulitis of colon        DISPOSITION  Disposition: Discharge to home  Patient condition is stable    Patient was seen and evaluated by both myself and Norma Zamora DO. NOTE: This report was transcribed using voice recognition software.  Every effort was made to ensure accuracy; however, inadvertent computerized transcription errors may be present           Drew Odonnell DO  Resident  02/08/23 7936

## 2023-02-09 NOTE — ED PROVIDER NOTES
[address] ENCOUNTER    Pt Name: Jaya Au. MRN: [id number]  Birthdate 2000  Date of evaluation: 2/8/2023  Provider: Jani Riedel, MD  PCP: CATIE Mcmillan NP  Note Started: 8:36 PM EST 2/8/23    HPI     Patient is a 20 y.o. male presents with a chief complaint of   Chief Complaint   Patient presents with    Abdominal Pain    Rectal Bleeding     Pt seen here last pm for abd pain/ states ids now bleeding rectally/ cherry red in color    . Patient presents with abdominal pain. Patient was seen here yesterday and was diagnosed with uncomplicated diverticulitis and was discharged home. Patient is having a few episodes of bloody stool. Patient takes no blood thinners. Patient denies any fever, chills, nausea, vomiting patient denies any other changes. States that he has a little bit of abdominal discomfort that is slightly worse. Nursing Notes were all reviewed and agreed with or any disagreements were addressed in the HPI. History From: Patient and patient's mother was at bedside    Review of Systems   Positives and Pertinent negatives as per HPI. Physical Exam  Vitals and nursing note reviewed. Constitutional:       Appearance: He is well-developed. HENT:      Head: Normocephalic and atraumatic. Eyes:      Conjunctiva/sclera: Conjunctivae normal.   Cardiovascular:      Rate and Rhythm: Normal rate and regular rhythm. Heart sounds: Normal heart sounds. No murmur heard. Pulmonary:      Effort: Pulmonary effort is normal. No respiratory distress. Breath sounds: Normal breath sounds. No wheezing or rales. Abdominal:      General: Bowel sounds are normal.      Palpations: Abdomen is soft. Tenderness: There is abdominal tenderness. There is no guarding or rebound. Comments: Mild diffuse abdominal tenderness   Musculoskeletal:         General: No tenderness or deformity.       Cervical back: Normal range of motion and neck supple. Skin:     General: Skin is warm and dry. Neurological:      Mental Status: He is alert and oriented to person, place, and time. Cranial Nerves: No cranial nerve deficit. Coordination: Coordination normal.        Procedures       MDM     Amount and/or Complexity of Data Reviewed  Decide to obtain previous medical records or to obtain history from someone other than the patient: yes         ED Course as of 02/08/23 2302   Wed Feb 08, 2023   2248 CT of the abdomen interpreted by me. Shows no intra-abdominal free air. [JM]      ED Course User Index  [JM] Pat Horowitz MD      Patient is a 20 y.o. male presenting with abdominal pain. Patient was seen here yesterday for diverticulitis. Patient had some slightly worsening pain. Patient has no fevers or chills. Patient has a mildly elevated heart rate. Patient was given fluids. Patient had uncomplicated diverticulitis and to start antibiotics. Patient had labs drawn. Patient's hemoglobin is stable. White count is improving. Patient takes no blood thinners. Patient was given Zofran. Patient had a CT of the abdomen that shows no intra-abdominal free air. Patient will follow-up with general surgery. Differential includes but is not limited to diverticulitis, appendicitis, anemia, GI bleed, perforated colon. Discussed today's results with patient and patient's mother. Patient clinically appears well. Not peritoneal        Patient was given return precautions. Patient will follow up with their primary care provider. Patient is agreeable to this plan. Patient has remained stable throughout their stay in the ED. Patient was seen and evaluated by myself and my attending Eliud Villeda DO. Assessment and Plan discussed with attending provider, please see attestation for final plan of care.   This note was done using dictation software and there may be some grammatical errors associated with this.      CONSULTS:   None    Medications given in the emergency room:  Medications   0.9 % sodium chloride bolus (1,000 mLs IntraVENous New Bag 2/8/23 2142)   iopamidol (ISOVUE-370) 76 % injection 18 mL (has no administration in time range)   ondansetron (ZOFRAN) injection 4 mg (4 mg IntraVENous Given 2/8/23 2143)        LABS:    Labs Reviewed   CBC WITH AUTO DIFFERENTIAL - Abnormal; Notable for the following components:       Result Value    WBC 14.5 (*)     MCHC 31.3 (*)     Neutrophils Absolute 9.95 (*)     Monocytes Absolute 1.02 (*)     All other components within normal limits   COMPREHENSIVE METABOLIC PANEL - Abnormal; Notable for the following components:    Glucose 105 (*)     All other components within normal limits   LACTIC ACID       As interpreted by me, the above displayed labs are abnormal. All other labs obtained during this visit were within normal range or not returned as of this dictation. RADIOLOGY:   Non-plain film images such as CT, Ultrasound and MRI are read by the radiologist. Plain radiographic images are visualized and preliminarily interpreted by the ED Provider with the below findings:        Interpretation per the Radiologist below, if available at the time of this note:    CT ABDOMEN PELVIS WO CONTRAST Additional Contrast? Oral    (Results Pending)     CT ABDOMEN PELVIS W IV CONTRAST Additional Contrast? None    Result Date: 2/7/2023  EXAMINATION: CT OF THE ABDOMEN AND PELVIS WITH CONTRAST 2/7/2023 7:39 pm TECHNIQUE: CT of the abdomen and pelvis was performed with the administration of intravenous contrast. Multiplanar reformatted images are provided for review. Automated exposure control, iterative reconstruction, and/or weight based adjustment of the mA/kV was utilized to reduce the radiation dose to as low as reasonably achievable. COMPARISON: None.  HISTORY: ORDERING SYSTEM PROVIDED HISTORY: LLQ Pain TECHNOLOGIST PROVIDED HISTORY: Reason for exam:->LLQ Pain Additional Contrast?->None Decision Support Exception - unselect if not a suspected or confirmed emergency medical condition->Emergency Medical Condition (MA) FINDINGS: Lower Chest: Visualized lungs are normal. Organs: The liver, spleen, adrenal glands, kidneys, pancreas and gallbladder are normal. GI/Bowel: Are thickening of the proximal sigmoid colon adjacent inflammatory stranding consistent with uncomplicated diverticulitis. Normal small bowel. Pelvis: A unremarkable. Peritoneum/Retroperitoneum: No free fluid or free air. Bones/Soft Tissues: Unremarkable. Thickening of the proximal sigmoid colon with adjacent inflammatory stranding consistent with uncomplicated diverticulitis. No results found. Sejal Benoit MD          ED Course as of 02/08/23 2302   Wed Feb 08, 2023   2248 CT of the abdomen interpreted by me. Shows no intra-abdominal free air. [SIENNA]      ED Course User Index  [] Sejal Benoit MD       --------------------------------------------- PAST HISTORY ---------------------------------------------  Past Medical History:  has a past medical history of Arthritis, Asperger syndrome, Asthma, Hypertension, Obesity, and Sleep apnea. Past Surgical History:  has a past surgical history that includes Tonsillectomy; Adenoidectomy; [place] tooth extraction; Colonoscopy; and Colonoscopy (N/A, 7/29/2022). Social History:  reports that he has never smoked. He has never used smokeless tobacco. He reports current alcohol use of about 1.0 standard drink per week. He reports that he does not use drugs. Family History: family history is not on file. The patients home medications have been reviewed. Allergies: Patient has no known allergies.     -------------------------------------------------- RESULTS -------------------------------------------------  Labs:  Results for orders placed or performed during the hospital encounter of 02/08/23   CBC with Auto Differential   Result Value Ref Range    WBC 14. 5 (H) 4.5 - 11.5 E9/L    RBC 4.95 3.80 - 5.80 E12/L    Hemoglobin 14.0 12.5 - 16.5 g/dL    Hematocrit 44.8 37.0 - 54.0 %    MCV 90.5 80.0 - 99.9 fL    MCH 28.3 26.0 - 35.0 pg    MCHC 31.3 (L) 32.0 - 34.5 %    RDW 13.2 11.5 - 15.0 fL    Platelets 910 201 - 286 E9/L    MPV 9.4 7.0 - 12.0 fL    Neutrophils % 68.5 43.0 - 80.0 %    Immature Granulocytes % 1.7 0.0 - 5.0 %    Lymphocytes % 21.3 20.0 - 42.0 %    Monocytes % 7.0 2.0 - 12.0 %    Eosinophils % 1.2 0.0 - 6.0 %    Basophils % 0.3 0.0 - 2.0 %    Neutrophils Absolute 9.95 (H) 1.80 - 7.30 E9/L    Immature Granulocytes # 0.24 E9/L    Lymphocytes Absolute 3.09 1.50 - 4.00 E9/L    Monocytes Absolute 1.02 (H) 0.10 - 0.95 E9/L    Eosinophils Absolute 0.18 0.05 - 0.50 E9/L    Basophils Absolute 0.04 0.00 - 0.20 E9/L   CMP   Result Value Ref Range    Sodium 139 132 - 146 mmol/L    Potassium 3.8 3.5 - 5.0 mmol/L    Chloride 101 98 - 107 mmol/L    CO2 28 22 - 29 mmol/L    Anion Gap 10 7 - 16 mmol/L    Glucose 105 (H) 74 - 99 mg/dL    BUN 10 6 - 20 mg/dL    Creatinine 0.8 0.7 - 1.2 mg/dL    Est, Glom Filt Rate >60 >=60 mL/min/1.73    Calcium 8.8 8.6 - 10.2 mg/dL    Total Protein 7.4 6.4 - 8.3 g/dL    Albumin 3.9 3.5 - 5.2 g/dL    Total Bilirubin 0.3 0.0 - 1.2 mg/dL    Alkaline Phosphatase 99 40 - 129 U/L    ALT 21 0 - 40 U/L    AST 21 0 - 39 U/L   Lactic Acid   Result Value Ref Range    Lactic Acid 0.9 0.5 - 2.2 mmol/L       Radiology:  CT ABDOMEN PELVIS WO CONTRAST Additional Contrast? Oral    (Results Pending)       ------------------------- NURSING NOTES AND VITALS REVIEWED ---------------------------  Date / Time Roomed:  2/8/2023  7:21 PM  ED Bed Assignment:  [id number]    The nursing notes within the ED encounter and vital signs as below have been reviewed.    BP (!) 161/79   Pulse (!) 102   Temp 98.2 °F (36.8 °C)   Wt (!) 352 lb (159.7 kg)   SpO2 100%   BMI 55.13 kg/m²   Oxygen Saturation Interpretation: Normal      ------------------------------------------ PROGRESS NOTES ------------------------------------------  10:13 PM EST  I have spoken with the patient and family if present and discussed todays results, in addition to providing specific details for the plan of care and counseling regarding the diagnosis and prognosis. Their questions are answered at this time and they are agreeable with the plan. I discussed at length with them reasons for immediate return here for re evaluation. They will followup with their primary care provider and general surgery by calling their office as soon as possible.      --------------------------------- ADDITIONAL PROVIDER NOTES ---------------------------------  At this time the patient is without objective evidence of an acute process requiring hospitalization or inpatient management. They have remained hemodynamically stable throughout their entire ED visit and are stable for discharge with outpatient follow-up. The plan has been discussed in detail and they are aware of the specific conditions for emergent return, as well as the importance of follow-up. New Prescriptions    No medications on file       Diagnosis:  1. Diverticulitis of colon        Disposition:  Patient's disposition: Discharge to home  Patient's condition is stable.                                            Rishabh Dailey MD  Resident  02/08/23 1100

## 2023-02-14 ENCOUNTER — TELEPHONE (OUTPATIENT)
Dept: SURGERY | Age: 23
End: 2023-02-14

## 2023-02-14 ENCOUNTER — OFFICE VISIT (OUTPATIENT)
Dept: SURGERY | Age: 23
End: 2023-02-14
Payer: COMMERCIAL

## 2023-02-14 VITALS
HEIGHT: 67 IN | WEIGHT: 315 LBS | BODY MASS INDEX: 49.44 KG/M2 | DIASTOLIC BLOOD PRESSURE: 87 MMHG | HEART RATE: 87 BPM | SYSTOLIC BLOOD PRESSURE: 151 MMHG | TEMPERATURE: 98.2 F

## 2023-02-14 DIAGNOSIS — K57.32 DIVERTICULITIS OF LARGE INTESTINE WITHOUT PERFORATION OR ABSCESS WITHOUT BLEEDING: Primary | ICD-10-CM

## 2023-02-14 PROCEDURE — G8484 FLU IMMUNIZE NO ADMIN: HCPCS | Performed by: SURGERY

## 2023-02-14 PROCEDURE — G8417 CALC BMI ABV UP PARAM F/U: HCPCS | Performed by: SURGERY

## 2023-02-14 PROCEDURE — 3079F DIAST BP 80-89 MM HG: CPT | Performed by: SURGERY

## 2023-02-14 PROCEDURE — 1036F TOBACCO NON-USER: CPT | Performed by: SURGERY

## 2023-02-14 PROCEDURE — 3077F SYST BP >= 140 MM HG: CPT | Performed by: SURGERY

## 2023-02-14 PROCEDURE — 99213 OFFICE O/P EST LOW 20 MIN: CPT | Performed by: SURGERY

## 2023-02-14 PROCEDURE — G8427 DOCREV CUR MEDS BY ELIG CLIN: HCPCS | Performed by: SURGERY

## 2023-02-14 RX ORDER — NEOMYCIN SULFATE 500 MG/1
1000 TABLET ORAL SEE ADMIN INSTRUCTIONS
Qty: 6 TABLET | Refills: 0 | Status: SHIPPED | OUTPATIENT
Start: 2023-02-14 | End: 2023-02-15

## 2023-02-14 RX ORDER — SULFAMETHOXAZOLE AND TRIMETHOPRIM 800; 160 MG/1; MG/1
1 TABLET ORAL 2 TIMES DAILY
COMMUNITY

## 2023-02-14 RX ORDER — POLYETHYLENE GLYCOL 3350, SODIUM SULFATE ANHYDROUS, SODIUM BICARBONATE, SODIUM CHLORIDE, POTASSIUM CHLORIDE 236; 22.74; 6.74; 5.86; 2.97 G/4L; G/4L; G/4L; G/4L; G/4L
4 POWDER, FOR SOLUTION ORAL ONCE
Qty: 4000 ML | Refills: 0 | Status: SHIPPED | OUTPATIENT
Start: 2023-02-14 | End: 2023-02-14

## 2023-02-14 RX ORDER — ERYTHROMYCIN 500 MG/1
TABLET, COATED ORAL
Qty: 6 TABLET | Refills: 0 | Status: SHIPPED | OUTPATIENT
Start: 2023-02-14

## 2023-02-14 NOTE — TELEPHONE ENCOUNTER
Prior Authorization Form:      DEMOGRAPHICS:                     Patient Name:  Clarke Wills Patient :  2000            Insurance:  Payor: AMANDA / Plan: Mayela Zamorano / Product Type: *No Product type* /   Insurance ID Number:    Payer/Plan Subscr  Sex Relation Sub. Ins. ID Effective Group Num   1. [id number] M 3/31/1969 Male Child [id number]                                   [address]   2.  Vasile Funez Sea* 2000 Male Self [id number]                                   [address]         DIAGNOSIS & PROCEDURE:                       Procedure/Operation: Laparoscopic robotic sigmoid colectomy           CPT Code: 48977    Diagnosis:  Diverticulitis    ICD10 Code: J68.21    Location:  [address] [place] [address]    Surgeon:  Salma Emerson INFORMATION:                          Date: 3.8.49383    Time: TBD              Anesthesia:  General                                                       Status:  Outpatient        Special Comments:         Electronically signed by Moi Colón on 2023 at 11:57 AM

## 2023-02-14 NOTE — TELEPHONE ENCOUNTER
Per the order of Dr. Eulalio Clemons, patient has been scheduled for Laparoscopic robotic sigmoid colectomy on 3.8.2023. Patient provided with procedure information during office visit and scheduled for post op follow up appointment. Patient instructed to please contact our office with any questions. Patient will need medical clearance for surgery. Request faxed. Patient provided with bowel prep and antibiotic prep for surgery. Procedure scheduled through [facility]. Dr. Eulalio Clemons to enter orders.   Electronically signed by Cesar Adhikari on 2/14/23 at 11:57 AM EST

## 2023-02-15 NOTE — PROGRESS NOTES
General Surgery History and Physical  [facility]    Patient's Name/Date of Birth: Priya Pimentel. / 2000    Date: February 14, 2023     Surgeon: Mckenna Faria MD    PCP: CATIE Plummer NP     Chief Complaint: Recurrent sigmoid diverticulitis    HPI:   Priya Briceno is a 20 y.o. male who presents for evaluation of recurrent left lower quadrant and suprapubic abdominal pain. He was first seen by me about 8 months ago when he was admitted with complicated sigmoid diverticular disease. He was treated with antibiotics and bowel rest and did well. Colonoscopy 3 months later revealed sigmoid diverticulosis without complication. Since then, he has had at least 3 visits to the emergency room at [facility] and also in [facility] for recurrent lower abdominal pain and what he reports as bladder pressure and pain with urination. He has also had rectal bleeding. He is told that at every visit, he is told to follow-up with the surgeon. He is not able to work because this recurrent pain is affecting his quality of life. He reports having recently decided not to take any more antibiotics as he feels like those are not helping and he keeps getting recurrent episodes of inflammation. I did  him on continuing to take his antibiotics.     Patient Active Problem List   Diagnosis    Other sprain of right middle finger, initial encounter    Tendinitis    Pain of finger of right hand    Perforated abdominal viscus    Diverticulitis of sigmoid colon    Morbid obesity ([address]    Perforation of sigmoid colon due to diverticulitis    Primary hypertension    Diverticulitis       Past Medical History:   Diagnosis Date    Arthritis     Asperger syndrome     Asthma     Hypertension     Obesity     Sleep apnea        Past Surgical History:   Procedure Laterality Date    ADENOIDECTOMY      COLONOSCOPY      COLONOSCOPY N/A 7/29/2022    COLONOSCOPY performed by Mckenna Faria MD at [place] [address] TONSILLECTOMY      WISDOM TOOTH EXTRACTION         No Known Allergies    The patient has a family history that is negative for severe cardiovascular or respiratory issues, negative for reaction to anesthesia. Time spent reviewing past medical, surgical, social and family history, vitals, nursing assessment and images. No changes from above documented history. Social History     Socioeconomic History    Marital status: Single     Spouse name: Not on file    Number of children: Not on file    Years of education: Not on file    Highest education level: Not on file   Occupational History    Not on file   Tobacco Use    Smoking status: Never    Smokeless tobacco: Never   Vaping Use    Vaping Use: Former    Substances: Nicotine, not ready to quit    Devices: Disposable   Substance and Sexual Activity    Alcohol use: Yes     Alcohol/week: 1.0 standard drink     Types: 1 Cans of beer per week     Comment: 1/month    Drug use: Never    Sexual activity: Not Currently   Other Topics Concern    Not on file   Social History Narrative    ** Merged History Encounter **          Social Determinants of Health     Financial Resource Strain: Not on file   Food Insecurity: Not on file   Transportation Needs: Not on file   Physical Activity: Not on file   Stress: Not on file   Social Connections: Not on file   Intimate Partner Violence: Not on file   Housing Stability: Not on file       I have reviewed relevant labs from this admission and interpretation is included in my assessment and plan    Review of Systems    A complete 10 system review was performed and are otherwise negative unless mentioned in the above HPI. Specific negatives are listed below but may not include all those reviewed.     General ROS: negative obtundation, AMS  ENT ROS: negative rhinorrhea, epistaxis  Allergy and Immunology ROS: negative itchy/watery eyes or nasal congestion  Hematological and Lymphatic ROS: negative spontaneous bleeding or bruising  Endocrine ROS: negative  lethargy, mood swings, palpitations or polydipsia/polyuria  Respiratory ROS: negative sputum changes, stridor, tachypnea or wheezing  Cardiovascular ROS: negative for - loss of consciousness, murmur or orthopnea  Gastrointestinal ROS: negative for - hematochezia or hematemesis  Genito-Urinary ROS: negative for -  genital discharge or hematuria  Musculoskeletal ROS: negative for - focal weakness, gangrene  Psych/Neuro ROS: negative for - visual or auditory hallucinations, suicidal ideation    Physical exam:   BP (!) 151/87   Pulse 87   Temp 98.2 °F (36.8 °C)   Ht 5' 7\" (1.702 m)   Wt (!) 352 lb (159.7 kg)   BMI 55.13 kg/m²   General appearance:  NAD, appears stated age  Head: NCAT, PERRLA, EOMI, red conjunctiva  Neck: supple, no masses, trachea midline  Lungs: Equal chest rise bilateral, no retractions, no wheezing  Heart: Reg rate  Abdomen: soft, tender mild left lower quadrant and suprapubic, super morbidly obese, nondistended  Skin; warm and dry, no cyanosis  Gu: no cva tenderness  Extremities: atraumatic, no focal motor deficits, no open wounds  Psych: No tremor, visual hallucinations      Radiology: I reviewed relevant abdominal imaging from this admission and that available in the EMR including CT abd/pel from 2/7/2023.  My assessment is acute sigmoid diverticulitis without perforation or abscess    Assessment:  Elver Cabrera is a 20 y.o. male with recurrent sigmoid diverticulitis  Patient Active Problem List   Diagnosis    Other sprain of right middle finger, initial encounter    Tendinitis    Pain of finger of right hand    Perforated abdominal viscus    Diverticulitis of sigmoid colon    Morbid obesity ([address]    Perforation of sigmoid colon due to diverticulitis    Primary hypertension    Diverticulitis         Plan:  I recommend sigmoid colectomy  Forrester prep  Mechanical bowel prep  Medical clearance  The procedure, the risks, benefits, alternatives were discussed with the patient and his mother. They agree to proceed.         Mariano Salguero MD  7:05 PM

## 2023-02-16 ENCOUNTER — TELEPHONE (OUTPATIENT)
Dept: BARIATRICS/WEIGHT MGMT | Age: 23
End: 2023-02-16

## 2023-03-06 RX ORDER — SODIUM CHLORIDE, SODIUM LACTATE, POTASSIUM CHLORIDE, CALCIUM CHLORIDE 600; 310; 30; 20 MG/100ML; MG/100ML; MG/100ML; MG/100ML
INJECTION, SOLUTION INTRAVENOUS CONTINUOUS
OUTPATIENT
Start: 2023-03-15

## 2023-03-07 ENCOUNTER — HOSPITAL ENCOUNTER (OUTPATIENT)
Dept: PREADMISSION TESTING | Age: 23
Discharge: HOME | End: 2023-03-07
Payer: COMMERCIAL

## 2023-03-07 VITALS
BODY MASS INDEX: 56.23 KG/M2 | OXYGEN SATURATION: 100 % | DIASTOLIC BLOOD PRESSURE: 74 MMHG | WEIGHT: 315 LBS | SYSTOLIC BLOOD PRESSURE: 127 MMHG | HEART RATE: 86 BPM | TEMPERATURE: 98.2 F | RESPIRATION RATE: 20 BRPM

## 2023-03-07 DIAGNOSIS — K57.92 DIVERTICULITIS: Primary | ICD-10-CM

## 2023-03-07 LAB
ANION GAP SERPL CALCULATED.3IONS-SCNC: 8 MMOL/L (ref 7–16)
BASOPHILS ABSOLUTE: 0.06 E9/L (ref 0–0.2)
BASOPHILS RELATIVE PERCENT: 0.4 % (ref 0–2)
BUN BLDV-MCNC: 13 MG/DL (ref 6–20)
CALCIUM SERPL-MCNC: 9.1 MG/DL (ref 8.6–10.2)
CHLORIDE BLD-SCNC: 101 MMOL/L (ref 98–107)
CO2: 26 MMOL/L (ref 22–29)
CREAT SERPL-MCNC: 0.7 MG/DL (ref 0.7–1.2)
EOSINOPHILS ABSOLUTE: 0.1 E9/L (ref 0.05–0.5)
EOSINOPHILS RELATIVE PERCENT: 0.7 % (ref 0–6)
GFR SERPL CREATININE-BSD FRML MDRD: >60 ML/MIN/1.73
GLUCOSE BLD-MCNC: 97 MG/DL (ref 74–99)
HCT VFR BLD CALC: 47.8 % (ref 37–54)
HEMOGLOBIN: 15.1 G/DL (ref 12.5–16.5)
IMMATURE GRANULOCYTES #: 0.23 E9/L
IMMATURE GRANULOCYTES %: 1.6 % (ref 0–5)
LYMPHOCYTES ABSOLUTE: 3.23 E9/L (ref 1.5–4)
LYMPHOCYTES RELATIVE PERCENT: 22.7 % (ref 20–42)
MCH RBC QN AUTO: 28 PG (ref 26–35)
MCHC RBC AUTO-ENTMCNC: 31.6 % (ref 32–34.5)
MCV RBC AUTO: 88.5 FL (ref 80–99.9)
MONOCYTES ABSOLUTE: 1.15 E9/L (ref 0.1–0.95)
MONOCYTES RELATIVE PERCENT: 8.1 % (ref 2–12)
NEUTROPHILS ABSOLUTE: 9.48 E9/L (ref 1.8–7.3)
NEUTROPHILS RELATIVE PERCENT: 66.5 % (ref 43–80)
PDW BLD-RTO: 13.6 FL (ref 11.5–15)
PLATELET # BLD: 325 E9/L (ref 130–450)
PMV BLD AUTO: 9.4 FL (ref 7–12)
POTASSIUM REFLEX MAGNESIUM: 4.5 MMOL/L (ref 3.5–5)
RBC # BLD: 5.4 E12/L (ref 3.8–5.8)
SODIUM BLD-SCNC: 135 MMOL/L (ref 132–146)
WBC # BLD: 14.3 E9/L (ref 4.5–11.5)

## 2023-03-07 PROCEDURE — 36415 COLL VENOUS BLD VENIPUNCTURE: CPT

## 2023-03-07 PROCEDURE — 87081 CULTURE SCREEN ONLY: CPT

## 2023-03-07 PROCEDURE — 80048 BASIC METABOLIC PNL TOTAL CA: CPT

## 2023-03-07 PROCEDURE — 85025 COMPLETE CBC W/AUTO DIFF WBC: CPT

## 2023-03-07 RX ORDER — NEOMYCIN SULFATE 500 MG/1
TABLET ORAL
COMMUNITY
Start: 2023-02-15

## 2023-03-07 RX ORDER — POLYETHYLENE GLYCOL-3350 AND ELECTROLYTES 236; 6.74; 5.86; 2.97; 22.74 G/274.31G; G/274.31G; G/274.31G; G/274.31G; G/274.31G
POWDER, FOR SOLUTION ORAL
COMMUNITY
Start: 2023-02-14

## 2023-03-07 ASSESSMENT — PAIN DESCRIPTION - ORIENTATION: ORIENTATION: LEFT;UPPER

## 2023-03-07 ASSESSMENT — PAIN DESCRIPTION - LOCATION: LOCATION: ABDOMEN

## 2023-03-07 ASSESSMENT — PAIN DESCRIPTION - ONSET: ONSET: ON-GOING

## 2023-03-07 ASSESSMENT — PAIN DESCRIPTION - FREQUENCY: FREQUENCY: CONTINUOUS

## 2023-03-07 ASSESSMENT — PAIN DESCRIPTION - PAIN TYPE: TYPE: CHRONIC PAIN

## 2023-03-07 ASSESSMENT — PAIN SCALES - GENERAL: PAINLEVEL_OUTOF10: 7

## 2023-03-07 ASSESSMENT — PAIN DESCRIPTION - DESCRIPTORS: DESCRIPTORS: ACHING;OTHER (COMMENT)

## 2023-03-07 NOTE — PROGRESS NOTES
[address]. Date: 3/7/2023    Date of surgery: 2/15/23   Arrival Time: Hospital will call you between 5pm and 7pm Tuesday evening with your final arrival time for surgery    Do not eat or drink anything after midnight prior to surgery. This includes no water, chewing gum, mints or ice chips. Take the following medications with a small sip of water on the morning of Surgery: Zyrtec, Zoloft and Topiramate; bring inhaler     Diabetics may take evening dose of insulin but none after midnight. If you feel symptomatic or low blood sugar morning of surgery drink 1-2 ounces of apple juice only. Aspirin, Ibuprofen, Advil, Naproxen, Vitamin E and other Anti-inflammatory products should be stopped  before surgery  as directed by your physician. Take Tylenol only unless instructed otherwise by your surgeon. Check with your Doctor regarding stopping Plavix, Coumadin, Lovenox, Eliquis, Effient, or other blood thinners. Do not smoke,use illicit drugs and do not drink any alcoholic beverages 24 hours prior to surgery. You may brush your teeth the morning of surgery. DO NOT SWALLOW WATER    You MUST make arrangements for a responsible adult to take you home after your surgery. You will not be allowed to leave alone or drive yourself home. It is strongly suggested someone stay with you the first 24 hrs. Your surgery will be cancelled if you do not have a ride home. PEDIATRIC PATIENTS ONLY:  A parent/legal guardian must accompany a child scheduled for surgery and plan to stay at the hospital until the child is discharged. Please do not bring other children with you.     Please wear simple, loose fitting clothing to the hospital.  Ovidio Lamas not bring valuables (money, credit cards, checkbooks, etc.) Do not wear any makeup (including no eye makeup) or nail polish on your fingers or toes. DO NOT wear any jewelry or piercings on day of surgery. All body piercing jewelry must be removed. Shower the night before surgery with _x__Antibacterial soap /MARILEE WIPES___x_____    TOTAL JOINT REPLACEMENT/HYSTERECTOMY PATIENTS ONLY---Remember to bring Blood Bank bracelet to the hospital on the day of surgery. If you have a Living Will and Durable Power of  for Healthcare, please bring in a copy. If appropriate bring crutches, inspirex, WALKER, CANE etc... Notify your Surgeon if you develop any illness between now and surgery time, cough, cold, fever, sore throat, nausea, vomiting, etc.  Please notify your surgeon if you experience dizziness, shortness of breath or blurred vision between now & the time of your surgery. If you have ___dentures, they will be removed before going to the OR; we will provide you a container. If you wear ___contact lenses or _x__glasses, they will be removed; please bring a case for them. To provide excellent care visitors will be limited to 2 in the room at any given time. Please bring picture ID and insurance card. Sleep apnea patients need to bring CPAP SETTINGS to hospital on day of surgery. During flu season no children under the age of 15 are permitted in the hospital for the safety of all patients. Other                   Please call AMBULATORY CARE if you have any further questions.    [address]     [address]

## 2023-03-09 LAB — MRSA CULTURE ONLY: NORMAL

## 2023-03-10 ENCOUNTER — ANESTHESIA EVENT (OUTPATIENT)
Dept: OPERATING ROOM | Age: 23
End: 2023-03-10
Payer: COMMERCIAL

## 2023-03-10 ASSESSMENT — LIFESTYLE VARIABLES: SMOKING_STATUS: 1

## 2023-03-10 NOTE — ANESTHESIA PRE PROCEDURE
Department of Anesthesiology  Preprocedure Note       Name:  Sophia Salazar. Age:  20 y.o.  :  2000                                          MRN:  [id number]         Date:  3/10/2023      Surgeon: Sedrick Coello):  Pardeep Estrada MD    Procedure: Procedure(s):  BOWEL RESECTION SIGMOID LAPAROSCOPIC ROBOTIC    Medications prior to admission:   Prior to Admission medications    Medication Sig Start Date End Date Taking?  Authorizing Provider   GAVILYTE-G 236 g solution TAKE 4,000 ML'S BY MOUTH ONCE FOR 1 DOSE 23   Historical Provider, MD   neomycin (MYCIFRADIN) 500 MG tablet TAKE 2 TABS AT 1:00 PM, 2:00 PM AND 10:00 PM THE DAY BEFORE SURGERY 2/15/23   Historical Provider, MD   erythromycin base (E-MYCIN) 500 MG tablet Take 2 tabs at 1 pm, 2 pm and 10 pm the day before surgery 23   Pardeep Estrada MD   ondansetron (ZOFRAN-ODT) 4 MG disintegrating tablet Take 1 tablet by mouth 3 times daily as needed for Nausea or Vomiting 23   Marv Winchester MD   albuterol sulfate HFA (PROVENTIL;VENTOLIN;PROAIR) 108 (90 Base) MCG/ACT inhaler Inhale 2 puffs into the lungs every 6 hours as needed for Wheezing or Shortness of Breath    Historical Provider, MD   lisinopril (PRINIVIL;ZESTRIL) 10 MG tablet Take 10 mg by mouth daily 22   Historical Provider, MD   topiramate (TOPAMAX) 50 MG tablet Take 50 mg by mouth daily  Patient not taking: No sig reported 21   Historical Provider, MD   cetirizine (ZYRTEC) 10 MG tablet Take 10 mg by mouth daily  22   Historical Provider, MD   vitamin D (ERGOCALCIFEROL) 1.25 MG (11374 UT) CAPS capsule Take 50,000 Units by mouth once a week 21   Historical Provider, MD   famotidine (PEPCID) 40 MG tablet Take 40 mg by mouth daily   Patient not taking: No sig reported 3/18/20   Historical Provider, MD   pimozide (ORAP) 2 MG tablet Take 2 mg by mouth nightly     Historical Provider, MD   sertraline (ZOLOFT) 25 MG tablet Take 25 mg by mouth daily  19 Historical Provider, MD       Current medications:    No current facility-administered medications for this encounter.      Current Outpatient Medications   Medication Sig Dispense Refill    GAVILYTE-G 236 g solution TAKE 4,000 ML'S BY MOUTH ONCE FOR 1 DOSE      neomycin (MYCIFRADIN) 500 MG tablet TAKE 2 TABS AT 1:00 PM, 2:00 PM AND 10:00 PM THE DAY BEFORE SURGERY      erythromycin base (E-MYCIN) 500 MG tablet Take 2 tabs at 1 pm, 2 pm and 10 pm the day before surgery 6 tablet 0    ondansetron (ZOFRAN-ODT) 4 MG disintegrating tablet Take 1 tablet by mouth 3 times daily as needed for Nausea or Vomiting 21 tablet 0    albuterol sulfate HFA (PROVENTIL;VENTOLIN;PROAIR) 108 (90 Base) MCG/ACT inhaler Inhale 2 puffs into the lungs every 6 hours as needed for Wheezing or Shortness of Breath      lisinopril (PRINIVIL;ZESTRIL) 10 MG tablet Take 10 mg by mouth daily      topiramate (TOPAMAX) 50 MG tablet Take 50 mg by mouth daily (Patient not taking: No sig reported)      cetirizine (ZYRTEC) 10 MG tablet Take 10 mg by mouth daily       vitamin D (ERGOCALCIFEROL) 1.25 MG (23533 UT) CAPS capsule Take 50,000 Units by mouth once a week Monday      famotidine (PEPCID) 40 MG tablet Take 40 mg by mouth daily  (Patient not taking: No sig reported)      pimozide (ORAP) 2 MG tablet Take 2 mg by mouth nightly       sertraline (ZOLOFT) 25 MG tablet Take 25 mg by mouth daily          Allergies:  No Known Allergies    Problem List:    Patient Active Problem List   Diagnosis Code    Other sprain of right middle finger, initial encounter P70.893W    Tendinitis M77.9    Pain of finger of right hand M79.644    Perforated abdominal viscus R19.8    Diverticulitis of sigmoid colon K57.32    Morbid obesity (HCC) E66.01    Perforation of sigmoid colon due to diverticulitis K57.20    Primary hypertension I10    Diverticulitis K57.92       Past Medical History:        Diagnosis Date    Arthritis     Asperger syndrome     Asthma  Diabetes mellitus ([facility] [address]     Prediabetic    Hypertension     Obesity     Sigmoid diverticulitis     Sleep apnea        Past Surgical History:        Procedure Laterality Date    ADENOIDECTOMY      COLONOSCOPY      COLONOSCOPY N/A 7/29/2022    COLONOSCOPY performed by Irineo Hutchison MD at [address] EXTRACTION         Social History:    Social History     Tobacco Use    Smoking status: Some Days     Types: Cigarettes    Smokeless tobacco: Never    Tobacco comments:     1-2 cigs per week   Substance Use Topics    Alcohol use: Yes     Alcohol/week: 1.0 standard drink     Types: 1 Cans of beer per week     Comment: rarely                                Ready to quit: Not Answered  Counseling given: Not Answered  Tobacco comments: 1-2 cigs per week      Vital Signs (Current): There were no vitals filed for this visit.                                            BP Readings from Last 3 Encounters:   03/07/23 127/74   02/14/23 (!) 151/87   02/08/23 (!) 145/88       NPO Status:                                                                                 BMI:   Wt Readings from Last 3 Encounters:   03/07/23 (!) 359 lb (162.8 kg)   02/14/23 (!) 352 lb (159.7 kg)   02/08/23 (!) 352 lb (159.7 kg)     There is no height or weight on file to calculate BMI.    CBC:   Lab Results   Component Value Date/Time    WBC 14.3 03/07/2023 12:43 PM    RBC 5.40 03/07/2023 12:43 PM    HGB 15.1 03/07/2023 12:43 PM    HCT 47.8 03/07/2023 12:43 PM    MCV 88.5 03/07/2023 12:43 PM    RDW 13.6 03/07/2023 12:43 PM     03/07/2023 12:43 PM       CMP:   Lab Results   Component Value Date/Time     03/07/2023 12:43 PM    K 4.5 03/07/2023 12:43 PM     03/07/2023 12:43 PM    CO2 26 03/07/2023 12:43 PM    BUN 13 03/07/2023 12:43 PM    CREATININE 0.7 03/07/2023 12:43 PM    GFRAA >60 08/25/2022 01:23 AM    LABGLOM >60 03/07/2023 12:43 PM    GLUCOSE 97 03/07/2023 12:43 PM    GLUCOSE 93 11/05/2011 08:40 AM    PROT 7.4 02/08/2023 09:08 PM    CALCIUM 9.1 03/07/2023 12:43 PM    BILITOT 0.3 02/08/2023 09:08 PM    ALKPHOS 99 02/08/2023 09:08 PM    AST 21 02/08/2023 09:08 PM    ALT 21 02/08/2023 09:08 PM       POC Tests: No results for input(s): POCGLU, POCNA, POCK, POCCL, POCBUN, POCHEMO, POCHCT in the last 72 hours. Coags: No results found for: PROTIME, INR, APTT    HCG (If Applicable): No results found for: PREGTESTUR, PREGSERUM, HCG, HCGQUANT     ABGs: No results found for: PHART, PO2ART, PJP0JIG, IZN1JQY, BEART, U6DMEGSI     Type & Screen (If Applicable):  No results found for: LABABO, LABRH    Drug/Infectious Status (If Applicable):  No results found for: HIV, HEPCAB    COVID-19 Screening (If Applicable):   Lab Results   Component Value Date/Time    COVID19 DETECTED 08/13/2022 09:46 PM           Anesthesia Evaluation  Patient summary reviewed and Nursing notes reviewed no history of anesthetic complications:   Airway: Mallampati: III  TM distance: >3 FB   Neck ROM: full  Mouth opening: > = 3 FB   Dental:          Pulmonary:normal exam  breath sounds clear to auscultation  (+) sleep apnea: on noncompliant,  asthma (Denies recent exacerbations or inhaler usage): current smoker          Patient did not smoke on day of surgery.                  Cardiovascular:  Exercise tolerance: good (>4 METS),   (+) hypertension: moderate and no interval change,     (-) past MI, CAD, CABG/stent, dysrhythmias and  angina    ECG reviewed  Rhythm: regular  Rate: normal           Beta Blocker:  Not on Beta Blocker      ROS comment: EKG:  Sinus tachycardia  Cannot rule out Anterior infarct , age undetermined  Abnormal ECG  When compared with ECG of 25-AUG-2022 01:20,  No significant change was found     Neuro/Psych:   (+) psychiatric history:depression/anxiety    (-) seizures, TIA and CVA            ROS comment: Asperger syndrome GI/Hepatic/Renal:   (+) morbid obesity (SMO with a BMI of 56.2 kg/m2)     (-) hepatitis and no renal disease      ROS comment: Diverticular disease of the sigmoid colon. Endo/Other:    (+) Diabetes (Diet controlled)Type II DM, , : arthritis: OA., .    (-) blood dyscrasia        Pt had no PAT visit       Abdominal:   (+) obese,           Vascular: negative vascular ROS. - DVT and PE. Other Findings:           Anesthesia Plan      general     ASA 3     (Modified RSI with HOB at 30 degrees RT  Pre-oxygenation x 3 minutes  20mg Ketamine  PONV prophylaxis)  Induction: intravenous. MIPS: Postoperative opioids intended and Prophylactic antiemetics administered. Anesthetic plan and risks discussed with patient. Plan discussed with CRNA.                 Marimar Linn DO   3/10/2023    REVIEWED CATIE Pennington CRNA  3/15/23

## 2023-03-15 ENCOUNTER — HOSPITAL ENCOUNTER (INPATIENT)
Age: 23
LOS: 2 days | Discharge: HOME | DRG: 330 | End: 2023-03-17
Attending: SURGERY | Admitting: SURGERY
Payer: COMMERCIAL

## 2023-03-15 ENCOUNTER — ANESTHESIA (OUTPATIENT)
Dept: OPERATING ROOM | Age: 23
End: 2023-03-15
Payer: COMMERCIAL

## 2023-03-15 DIAGNOSIS — K57.92 DIVERTICULITIS: ICD-10-CM

## 2023-03-15 DIAGNOSIS — G89.18 POSTOPERATIVE PAIN: Primary | ICD-10-CM

## 2023-03-15 PROCEDURE — 6370000000 HC RX 637 (ALT 250 FOR IP): Performed by: ANESTHESIOLOGY

## 2023-03-15 PROCEDURE — 2580000003 HC RX 258: Performed by: ANESTHESIOLOGY

## 2023-03-15 PROCEDURE — 6360000002 HC RX W HCPCS: Performed by: ANESTHESIOLOGY

## 2023-03-15 PROCEDURE — 6360000002 HC RX W HCPCS

## 2023-03-15 PROCEDURE — 2709999900 HC NON-CHARGEABLE SUPPLY: Performed by: SURGERY

## 2023-03-15 PROCEDURE — 2580000003 HC RX 258: Performed by: SURGERY

## 2023-03-15 PROCEDURE — 3700000001 HC ADD 15 MINUTES (ANESTHESIA): Performed by: SURGERY

## 2023-03-15 PROCEDURE — 7100000001 HC PACU RECOVERY - ADDTL 15 MIN: Performed by: SURGERY

## 2023-03-15 PROCEDURE — 2500000003 HC RX 250 WO HCPCS: Performed by: SURGERY

## 2023-03-15 PROCEDURE — 6360000002 HC RX W HCPCS: Performed by: SURGERY

## 2023-03-15 PROCEDURE — 7100000000 HC PACU RECOVERY - FIRST 15 MIN: Performed by: SURGERY

## 2023-03-15 PROCEDURE — 8E0W4CZ ROBOTIC ASSISTED PROCEDURE OF TRUNK REGION, PERCUTANEOUS ENDOSCOPIC APPROACH: ICD-10-PCS | Performed by: SURGERY

## 2023-03-15 PROCEDURE — 0DBN4ZZ EXCISION OF SIGMOID COLON, PERCUTANEOUS ENDOSCOPIC APPROACH: ICD-10-PCS | Performed by: SURGERY

## 2023-03-15 PROCEDURE — 2720000010 HC SURG SUPPLY STERILE: Performed by: SURGERY

## 2023-03-15 PROCEDURE — 1200000000 HC SEMI PRIVATE

## 2023-03-15 PROCEDURE — S2900 ROBOTIC SURGICAL SYSTEM: HCPCS | Performed by: SURGERY

## 2023-03-15 PROCEDURE — 99222 1ST HOSP IP/OBS MODERATE 55: CPT | Performed by: STUDENT IN AN ORGANIZED HEALTH CARE EDUCATION/TRAINING PROGRAM

## 2023-03-15 PROCEDURE — 3600000019 HC SURGERY ROBOT ADDTL 15MIN: Performed by: SURGERY

## 2023-03-15 PROCEDURE — 6370000000 HC RX 637 (ALT 250 FOR IP): Performed by: SURGERY

## 2023-03-15 PROCEDURE — 3700000000 HC ANESTHESIA ATTENDED CARE: Performed by: SURGERY

## 2023-03-15 PROCEDURE — 2500000003 HC RX 250 WO HCPCS: Performed by: ANESTHESIOLOGY

## 2023-03-15 PROCEDURE — 88307 TISSUE EXAM BY PATHOLOGIST: CPT

## 2023-03-15 PROCEDURE — 3600000009 HC SURGERY ROBOT BASE: Performed by: SURGERY

## 2023-03-15 PROCEDURE — 2500000003 HC RX 250 WO HCPCS

## 2023-03-15 RX ORDER — HYDROMORPHONE HYDROCHLORIDE 1 MG/ML
0.5 INJECTION, SOLUTION INTRAMUSCULAR; INTRAVENOUS; SUBCUTANEOUS EVERY 5 MIN PRN
Status: DISCONTINUED | OUTPATIENT
Start: 2023-03-15 | End: 2023-03-15 | Stop reason: HOSPADM

## 2023-03-15 RX ORDER — PROCHLORPERAZINE EDISYLATE 5 MG/ML
5 INJECTION INTRAMUSCULAR; INTRAVENOUS
Status: DISCONTINUED | OUTPATIENT
Start: 2023-03-15 | End: 2023-03-15 | Stop reason: HOSPADM

## 2023-03-15 RX ORDER — SODIUM CHLORIDE 0.9 % (FLUSH) 0.9 %
5-40 SYRINGE (ML) INJECTION PRN
Status: DISCONTINUED | OUTPATIENT
Start: 2023-03-15 | End: 2023-03-17 | Stop reason: HOSPADM

## 2023-03-15 RX ORDER — SODIUM CHLORIDE 9 MG/ML
INJECTION, SOLUTION INTRAVENOUS PRN
Status: DISCONTINUED | OUTPATIENT
Start: 2023-03-15 | End: 2023-03-15 | Stop reason: HOSPADM

## 2023-03-15 RX ORDER — LIDOCAINE HYDROCHLORIDE 20 MG/ML
INJECTION, SOLUTION INTRAVENOUS PRN
Status: DISCONTINUED | OUTPATIENT
Start: 2023-03-15 | End: 2023-03-15 | Stop reason: SDUPTHER

## 2023-03-15 RX ORDER — SODIUM CHLORIDE, SODIUM LACTATE, POTASSIUM CHLORIDE, CALCIUM CHLORIDE 600; 310; 30; 20 MG/100ML; MG/100ML; MG/100ML; MG/100ML
INJECTION, SOLUTION INTRAVENOUS CONTINUOUS
Status: DISCONTINUED | OUTPATIENT
Start: 2023-03-15 | End: 2023-03-15 | Stop reason: HOSPADM

## 2023-03-15 RX ORDER — SODIUM CHLORIDE 9 MG/ML
INJECTION, SOLUTION INTRAVENOUS CONTINUOUS
Status: DISCONTINUED | OUTPATIENT
Start: 2023-03-15 | End: 2023-03-16

## 2023-03-15 RX ORDER — SODIUM CHLORIDE 0.9 % (FLUSH) 0.9 %
5-40 SYRINGE (ML) INJECTION EVERY 12 HOURS SCHEDULED
Status: DISCONTINUED | OUTPATIENT
Start: 2023-03-15 | End: 2023-03-15 | Stop reason: HOSPADM

## 2023-03-15 RX ORDER — MEPERIDINE HYDROCHLORIDE 25 MG/ML
12.5 INJECTION INTRAMUSCULAR; INTRAVENOUS; SUBCUTANEOUS ONCE
Status: DISCONTINUED | OUTPATIENT
Start: 2023-03-15 | End: 2023-03-15 | Stop reason: HOSPADM

## 2023-03-15 RX ORDER — DIPHENHYDRAMINE HYDROCHLORIDE 50 MG/ML
12.5 INJECTION INTRAMUSCULAR; INTRAVENOUS
Status: DISCONTINUED | OUTPATIENT
Start: 2023-03-15 | End: 2023-03-15 | Stop reason: HOSPADM

## 2023-03-15 RX ORDER — FAMOTIDINE 20 MG/1
20 TABLET, FILM COATED ORAL DAILY
Status: DISCONTINUED | OUTPATIENT
Start: 2023-03-16 | End: 2023-03-17 | Stop reason: HOSPADM

## 2023-03-15 RX ORDER — SODIUM CHLORIDE 9 MG/ML
INJECTION, SOLUTION INTRAVENOUS PRN
Status: DISCONTINUED | OUTPATIENT
Start: 2023-03-15 | End: 2023-03-17 | Stop reason: HOSPADM

## 2023-03-15 RX ORDER — METHOCARBAMOL 100 MG/ML
1000 INJECTION, SOLUTION INTRAMUSCULAR; INTRAVENOUS ONCE
Status: COMPLETED | OUTPATIENT
Start: 2023-03-15 | End: 2023-03-15

## 2023-03-15 RX ORDER — METOCLOPRAMIDE HYDROCHLORIDE 5 MG/ML
10 INJECTION INTRAMUSCULAR; INTRAVENOUS ONCE
Status: COMPLETED | OUTPATIENT
Start: 2023-03-15 | End: 2023-03-15

## 2023-03-15 RX ORDER — ALBUTEROL SULFATE 2.5 MG/3ML
2.5 SOLUTION RESPIRATORY (INHALATION) EVERY 6 HOURS PRN
Status: DISCONTINUED | OUTPATIENT
Start: 2023-03-15 | End: 2023-03-17 | Stop reason: HOSPADM

## 2023-03-15 RX ORDER — OXYCODONE HYDROCHLORIDE 5 MG/1
5 TABLET ORAL EVERY 4 HOURS PRN
Status: DISCONTINUED | OUTPATIENT
Start: 2023-03-15 | End: 2023-03-17 | Stop reason: HOSPADM

## 2023-03-15 RX ORDER — ENOXAPARIN SODIUM 100 MG/ML
40 INJECTION SUBCUTANEOUS DAILY
Status: DISCONTINUED | OUTPATIENT
Start: 2023-03-15 | End: 2023-03-15 | Stop reason: DRUGHIGH

## 2023-03-15 RX ORDER — TOPIRAMATE 25 MG/1
50 TABLET ORAL DAILY
Status: DISCONTINUED | OUTPATIENT
Start: 2023-03-15 | End: 2023-03-17 | Stop reason: HOSPADM

## 2023-03-15 RX ORDER — INDOCYANINE GREEN AND WATER 25 MG
5 KIT INJECTION
Status: COMPLETED | OUTPATIENT
Start: 2023-03-15 | End: 2023-03-15

## 2023-03-15 RX ORDER — FENTANYL CITRATE 50 UG/ML
INJECTION, SOLUTION INTRAMUSCULAR; INTRAVENOUS PRN
Status: DISCONTINUED | OUTPATIENT
Start: 2023-03-15 | End: 2023-03-15 | Stop reason: SDUPTHER

## 2023-03-15 RX ORDER — EPHEDRINE SULFATE/0.9% NACL/PF 50 MG/5 ML
SYRINGE (ML) INTRAVENOUS PRN
Status: DISCONTINUED | OUTPATIENT
Start: 2023-03-15 | End: 2023-03-15 | Stop reason: SDUPTHER

## 2023-03-15 RX ORDER — ONDANSETRON 2 MG/ML
INJECTION INTRAMUSCULAR; INTRAVENOUS PRN
Status: DISCONTINUED | OUTPATIENT
Start: 2023-03-15 | End: 2023-03-15 | Stop reason: SDUPTHER

## 2023-03-15 RX ORDER — ERGOCALCIFEROL 1.25 MG/1
50000 CAPSULE ORAL WEEKLY
Status: DISCONTINUED | OUTPATIENT
Start: 2023-03-20 | End: 2023-03-17 | Stop reason: HOSPADM

## 2023-03-15 RX ORDER — MIDAZOLAM HYDROCHLORIDE 1 MG/ML
INJECTION INTRAMUSCULAR; INTRAVENOUS PRN
Status: DISCONTINUED | OUTPATIENT
Start: 2023-03-15 | End: 2023-03-15 | Stop reason: SDUPTHER

## 2023-03-15 RX ORDER — ROCURONIUM BROMIDE 10 MG/ML
INJECTION, SOLUTION INTRAVENOUS PRN
Status: DISCONTINUED | OUTPATIENT
Start: 2023-03-15 | End: 2023-03-15 | Stop reason: SDUPTHER

## 2023-03-15 RX ORDER — CETIRIZINE HYDROCHLORIDE 10 MG/1
10 TABLET ORAL DAILY
Status: DISCONTINUED | OUTPATIENT
Start: 2023-03-16 | End: 2023-03-17 | Stop reason: HOSPADM

## 2023-03-15 RX ORDER — ALBUTEROL SULFATE 90 UG/1
2 AEROSOL, METERED RESPIRATORY (INHALATION) EVERY 6 HOURS PRN
Status: DISCONTINUED | OUTPATIENT
Start: 2023-03-15 | End: 2023-03-15 | Stop reason: CLARIF

## 2023-03-15 RX ORDER — OXYCODONE HYDROCHLORIDE 5 MG/1
10 TABLET ORAL EVERY 4 HOURS PRN
Status: DISCONTINUED | OUTPATIENT
Start: 2023-03-15 | End: 2023-03-17 | Stop reason: HOSPADM

## 2023-03-15 RX ORDER — LABETALOL HYDROCHLORIDE 5 MG/ML
5 INJECTION, SOLUTION INTRAVENOUS
Status: DISCONTINUED | OUTPATIENT
Start: 2023-03-15 | End: 2023-03-15 | Stop reason: HOSPADM

## 2023-03-15 RX ORDER — FENTANYL CITRATE 0.05 MG/ML
25 INJECTION, SOLUTION INTRAMUSCULAR; INTRAVENOUS EVERY 5 MIN PRN
Status: DISCONTINUED | OUTPATIENT
Start: 2023-03-15 | End: 2023-03-15 | Stop reason: HOSPADM

## 2023-03-15 RX ORDER — METRONIDAZOLE 500 MG/100ML
500 INJECTION, SOLUTION INTRAVENOUS ONCE
Status: COMPLETED | OUTPATIENT
Start: 2023-03-15 | End: 2023-03-15

## 2023-03-15 RX ORDER — KETAMINE HCL IN NACL, ISO-OSM 100MG/10ML
SYRINGE (ML) INJECTION PRN
Status: DISCONTINUED | OUTPATIENT
Start: 2023-03-15 | End: 2023-03-15 | Stop reason: SDUPTHER

## 2023-03-15 RX ORDER — METHOCARBAMOL 100 MG/ML
INJECTION, SOLUTION INTRAMUSCULAR; INTRAVENOUS
Status: COMPLETED
Start: 2023-03-15 | End: 2023-03-15

## 2023-03-15 RX ORDER — ONDANSETRON 4 MG/1
4 TABLET, ORALLY DISINTEGRATING ORAL EVERY 8 HOURS PRN
Status: DISCONTINUED | OUTPATIENT
Start: 2023-03-15 | End: 2023-03-17 | Stop reason: HOSPADM

## 2023-03-15 RX ORDER — PROPOFOL 10 MG/ML
INJECTION, EMULSION INTRAVENOUS PRN
Status: DISCONTINUED | OUTPATIENT
Start: 2023-03-15 | End: 2023-03-15 | Stop reason: SDUPTHER

## 2023-03-15 RX ORDER — SODIUM CHLORIDE 0.9 % (FLUSH) 0.9 %
5-40 SYRINGE (ML) INJECTION PRN
Status: DISCONTINUED | OUTPATIENT
Start: 2023-03-15 | End: 2023-03-15 | Stop reason: HOSPADM

## 2023-03-15 RX ORDER — SODIUM CHLORIDE 0.9 % (FLUSH) 0.9 %
5-40 SYRINGE (ML) INJECTION EVERY 12 HOURS SCHEDULED
Status: DISCONTINUED | OUTPATIENT
Start: 2023-03-15 | End: 2023-03-17 | Stop reason: HOSPADM

## 2023-03-15 RX ORDER — FAMOTIDINE 10 MG/ML
20 INJECTION, SOLUTION INTRAVENOUS ONCE
Status: COMPLETED | OUTPATIENT
Start: 2023-03-15 | End: 2023-03-15

## 2023-03-15 RX ORDER — ONDANSETRON 2 MG/ML
4 INJECTION INTRAMUSCULAR; INTRAVENOUS EVERY 6 HOURS PRN
Status: DISCONTINUED | OUTPATIENT
Start: 2023-03-15 | End: 2023-03-17 | Stop reason: HOSPADM

## 2023-03-15 RX ORDER — ENOXAPARIN SODIUM 100 MG/ML
40 INJECTION SUBCUTANEOUS 2 TIMES DAILY
Status: DISCONTINUED | OUTPATIENT
Start: 2023-03-16 | End: 2023-03-17 | Stop reason: HOSPADM

## 2023-03-15 RX ORDER — KETOROLAC TROMETHAMINE 30 MG/ML
INJECTION, SOLUTION INTRAMUSCULAR; INTRAVENOUS PRN
Status: DISCONTINUED | OUTPATIENT
Start: 2023-03-15 | End: 2023-03-15 | Stop reason: SDUPTHER

## 2023-03-15 RX ORDER — ONDANSETRON 4 MG/1
4 TABLET, ORALLY DISINTEGRATING ORAL 3 TIMES DAILY PRN
Status: DISCONTINUED | OUTPATIENT
Start: 2023-03-15 | End: 2023-03-15 | Stop reason: SDUPTHER

## 2023-03-15 RX ORDER — DEXAMETHASONE SODIUM PHOSPHATE 10 MG/ML
INJECTION, SOLUTION INTRAMUSCULAR; INTRAVENOUS PRN
Status: DISCONTINUED | OUTPATIENT
Start: 2023-03-15 | End: 2023-03-15 | Stop reason: SDUPTHER

## 2023-03-15 RX ORDER — PIMOZIDE 2 MG/1
2 TABLET ORAL NIGHTLY
Status: DISCONTINUED | OUTPATIENT
Start: 2023-03-15 | End: 2023-03-17 | Stop reason: HOSPADM

## 2023-03-15 RX ORDER — ACETAMINOPHEN 500 MG
1000 TABLET ORAL ONCE
Status: COMPLETED | OUTPATIENT
Start: 2023-03-15 | End: 2023-03-15

## 2023-03-15 RX ORDER — LISINOPRIL 10 MG/1
10 TABLET ORAL DAILY
Status: DISCONTINUED | OUTPATIENT
Start: 2023-03-15 | End: 2023-03-17 | Stop reason: HOSPADM

## 2023-03-15 RX ORDER — MORPHINE SULFATE 2 MG/ML
2 INJECTION, SOLUTION INTRAMUSCULAR; INTRAVENOUS
Status: DISCONTINUED | OUTPATIENT
Start: 2023-03-15 | End: 2023-03-17 | Stop reason: HOSPADM

## 2023-03-15 RX ORDER — HYDRALAZINE HYDROCHLORIDE 20 MG/ML
5 INJECTION INTRAMUSCULAR; INTRAVENOUS
Status: DISCONTINUED | OUTPATIENT
Start: 2023-03-15 | End: 2023-03-15 | Stop reason: HOSPADM

## 2023-03-15 RX ORDER — MORPHINE SULFATE 4 MG/ML
4 INJECTION, SOLUTION INTRAMUSCULAR; INTRAVENOUS
Status: DISCONTINUED | OUTPATIENT
Start: 2023-03-15 | End: 2023-03-17 | Stop reason: HOSPADM

## 2023-03-15 RX ORDER — GLYCOPYRROLATE 0.2 MG/ML
INJECTION INTRAMUSCULAR; INTRAVENOUS PRN
Status: DISCONTINUED | OUTPATIENT
Start: 2023-03-15 | End: 2023-03-15 | Stop reason: SDUPTHER

## 2023-03-15 RX ORDER — BUPIVACAINE HYDROCHLORIDE AND EPINEPHRINE 2.5; 5 MG/ML; UG/ML
INJECTION, SOLUTION EPIDURAL; INFILTRATION; INTRACAUDAL; PERINEURAL PRN
Status: DISCONTINUED | OUTPATIENT
Start: 2023-03-15 | End: 2023-03-15 | Stop reason: HOSPADM

## 2023-03-15 RX ADMIN — Medication 10 MG: at 11:17

## 2023-03-15 RX ADMIN — SODIUM CHLORIDE, POTASSIUM CHLORIDE, SODIUM LACTATE AND CALCIUM CHLORIDE: 600; 310; 30; 20 INJECTION, SOLUTION INTRAVENOUS at 09:05

## 2023-03-15 RX ADMIN — HYDROMORPHONE HYDROCHLORIDE 0.5 MG: 1 INJECTION, SOLUTION INTRAMUSCULAR; INTRAVENOUS; SUBCUTANEOUS at 12:50

## 2023-03-15 RX ADMIN — HYDROMORPHONE HYDROCHLORIDE 0.5 MG: 1 INJECTION, SOLUTION INTRAMUSCULAR; INTRAVENOUS; SUBCUTANEOUS at 12:40

## 2023-03-15 RX ADMIN — ROCURONIUM BROMIDE 10 MG: 10 INJECTION INTRAVENOUS at 11:16

## 2023-03-15 RX ADMIN — METRONIDAZOLE 500 MG: 500 INJECTION, SOLUTION INTRAVENOUS at 10:03

## 2023-03-15 RX ADMIN — KETOROLAC TROMETHAMINE 30 MG: 30 INJECTION, SOLUTION INTRAMUSCULAR at 12:10

## 2023-03-15 RX ADMIN — SODIUM CHLORIDE, POTASSIUM CHLORIDE, SODIUM LACTATE AND CALCIUM CHLORIDE: 600; 310; 30; 20 INJECTION, SOLUTION INTRAVENOUS at 12:22

## 2023-03-15 RX ADMIN — INDOCYANINE GREEN AND WATER 5 MG: KIT at 11:11

## 2023-03-15 RX ADMIN — Medication 20 MG: at 09:53

## 2023-03-15 RX ADMIN — TOPIRAMATE 50 MG: 25 TABLET, FILM COATED ORAL at 14:11

## 2023-03-15 RX ADMIN — FENTANYL CITRATE 50 MCG: 50 INJECTION, SOLUTION INTRAMUSCULAR; INTRAVENOUS at 11:44

## 2023-03-15 RX ADMIN — LIDOCAINE HYDROCHLORIDE 100 MG: 20 INJECTION, SOLUTION INTRAVENOUS at 09:53

## 2023-03-15 RX ADMIN — MIDAZOLAM 2 MG: 1 INJECTION INTRAMUSCULAR; INTRAVENOUS at 09:48

## 2023-03-15 RX ADMIN — SODIUM CHLORIDE: 9 INJECTION, SOLUTION INTRAVENOUS at 14:11

## 2023-03-15 RX ADMIN — DEXAMETHASONE SODIUM PHOSPHATE 10 MG: 10 INJECTION INTRAMUSCULAR; INTRAVENOUS at 09:57

## 2023-03-15 RX ADMIN — FENTANYL CITRATE 150 MCG: 50 INJECTION, SOLUTION INTRAMUSCULAR; INTRAVENOUS at 09:53

## 2023-03-15 RX ADMIN — CEFAZOLIN 3000 MG: 10 INJECTION, POWDER, FOR SOLUTION INTRAVENOUS at 09:57

## 2023-03-15 RX ADMIN — METHOCARBAMOL 1000 MG: 100 INJECTION INTRAMUSCULAR; INTRAVENOUS at 12:35

## 2023-03-15 RX ADMIN — METHOCARBAMOL 1000 MG: 100 INJECTION, SOLUTION INTRAMUSCULAR; INTRAVENOUS at 12:35

## 2023-03-15 RX ADMIN — SUGAMMADEX 300 MG: 100 INJECTION, SOLUTION INTRAVENOUS at 12:11

## 2023-03-15 RX ADMIN — HYDROMORPHONE HYDROCHLORIDE 0.5 MG: 1 INJECTION, SOLUTION INTRAMUSCULAR; INTRAVENOUS; SUBCUTANEOUS at 13:05

## 2023-03-15 RX ADMIN — METOCLOPRAMIDE 10 MG: 5 INJECTION, SOLUTION INTRAMUSCULAR; INTRAVENOUS at 09:57

## 2023-03-15 RX ADMIN — ONDANSETRON 4 MG: 2 INJECTION INTRAMUSCULAR; INTRAVENOUS at 12:03

## 2023-03-15 RX ADMIN — SERTRALINE 25 MG: 50 TABLET, FILM COATED ORAL at 14:11

## 2023-03-15 RX ADMIN — FAMOTIDINE 20 MG: 10 INJECTION, SOLUTION INTRAVENOUS at 09:06

## 2023-03-15 RX ADMIN — Medication 5 MG: at 10:54

## 2023-03-15 RX ADMIN — ROCURONIUM BROMIDE 50 MG: 10 INJECTION INTRAVENOUS at 09:53

## 2023-03-15 RX ADMIN — FENTANYL CITRATE 50 MCG: 50 INJECTION, SOLUTION INTRAMUSCULAR; INTRAVENOUS at 11:17

## 2023-03-15 RX ADMIN — GLYCOPYRROLATE 0.2 MG: 0.2 INJECTION INTRAMUSCULAR; INTRAVENOUS at 10:27

## 2023-03-15 RX ADMIN — PROPOFOL 200 MG: 10 INJECTION, EMULSION INTRAVENOUS at 09:53

## 2023-03-15 RX ADMIN — OXYCODONE HYDROCHLORIDE 10 MG: 5 TABLET ORAL at 18:49

## 2023-03-15 RX ADMIN — PROPOFOL 60 MG: 10 INJECTION, EMULSION INTRAVENOUS at 12:12

## 2023-03-15 RX ADMIN — PROPOFOL 40 MG: 10 INJECTION, EMULSION INTRAVENOUS at 12:16

## 2023-03-15 RX ADMIN — FENTANYL CITRATE 50 MCG: 50 INJECTION, SOLUTION INTRAMUSCULAR; INTRAVENOUS at 12:25

## 2023-03-15 RX ADMIN — FENTANYL CITRATE 50 MCG: 50 INJECTION, SOLUTION INTRAMUSCULAR; INTRAVENOUS at 12:13

## 2023-03-15 RX ADMIN — ACETAMINOPHEN 1000 MG: 500 TABLET, FILM COATED ORAL at 09:06

## 2023-03-15 RX ADMIN — SODIUM CHLORIDE, POTASSIUM CHLORIDE, SODIUM LACTATE AND CALCIUM CHLORIDE: 600; 310; 30; 20 INJECTION, SOLUTION INTRAVENOUS at 10:33

## 2023-03-15 RX ADMIN — PROPOFOL 20 MG: 10 INJECTION, EMULSION INTRAVENOUS at 12:19

## 2023-03-15 RX ADMIN — OXYCODONE HYDROCHLORIDE 10 MG: 5 TABLET ORAL at 14:11

## 2023-03-15 RX ADMIN — LISINOPRIL 10 MG: 10 TABLET ORAL at 14:12

## 2023-03-15 RX ADMIN — ROCURONIUM BROMIDE 10 MG: 10 INJECTION INTRAVENOUS at 10:29

## 2023-03-15 ASSESSMENT — PAIN DESCRIPTION - PAIN TYPE
TYPE: SURGICAL PAIN
TYPE: SURGICAL PAIN

## 2023-03-15 ASSESSMENT — PAIN DESCRIPTION - DESCRIPTORS
DESCRIPTORS: ACHING;SORE;TENDER;THROBBING
DESCRIPTORS: ACHING;DISCOMFORT

## 2023-03-15 ASSESSMENT — LIFESTYLE VARIABLES
HOW OFTEN DO YOU HAVE A DRINK CONTAINING ALCOHOL: NEVER
HOW MANY STANDARD DRINKS CONTAINING ALCOHOL DO YOU HAVE ON A TYPICAL DAY: PATIENT DOES NOT DRINK

## 2023-03-15 ASSESSMENT — PAIN SCALES - GENERAL
PAINLEVEL_OUTOF10: 10
PAINLEVEL_OUTOF10: 10
PAINLEVEL_OUTOF10: 0
PAINLEVEL_OUTOF10: 7

## 2023-03-15 ASSESSMENT — PAIN DESCRIPTION - ORIENTATION
ORIENTATION: RIGHT;LEFT;MID;LOWER
ORIENTATION: ANTERIOR

## 2023-03-15 ASSESSMENT — PAIN DESCRIPTION - LOCATION
LOCATION: ABDOMEN

## 2023-03-15 ASSESSMENT — PAIN - FUNCTIONAL ASSESSMENT: PAIN_FUNCTIONAL_ASSESSMENT: 0-10

## 2023-03-15 NOTE — H&P
General Surgery History and Physical  [facility]    Patient's Name/Date of Birth: Emily Reina. / 2000    Date: March 15, 2023     Surgeon: Solis Bruner MD    PCP: Shady Nair MD     Chief Complaint: Recurrent sigmoid diverticulitis    HPI:   Emily Darby is a 20 y.o. male who presents for evaluation of recurrent left lower quadrant and suprapubic abdominal pain. He was first seen by me about 8 months ago when he was admitted with complicated sigmoid diverticular disease. He was treated with antibiotics and bowel rest and did well. Colonoscopy 3 months later revealed sigmoid diverticulosis without complication. Since then, he has had at least 3 visits to the emergency room at [facility]  and also in [facility] for recurrent lower abdominal pain and what he reports as bladder pressure and pain with urination. He has also had rectal bleeding. He is told that at every visit, he is told to follow-up with the surgeon. He is not able to work because this recurrent pain is affecting his quality of life. He reports having recently decided not to take any more antibiotics as he feels like those are not helping and he keeps getting recurrent episodes of inflammation. I did  him on continuing to take his antibiotics.     Patient Active Problem List   Diagnosis    Other sprain of right middle finger, initial encounter    Tendinitis    Pain of finger of right hand    Perforated abdominal viscus    Diverticulitis of sigmoid colon    Morbid obesity ([address]    Perforation of sigmoid colon due to diverticulitis    Primary hypertension    Diverticulitis       Past Medical History:   Diagnosis Date    Arthritis     Asperger syndrome     Asthma     Diabetes mellitus ([address]     Prediabetic    Hypertension     Obesity     Sigmoid diverticulitis     Sleep apnea        Past Surgical History:   Procedure Laterality Date    ADENOIDECTOMY      COLONOSCOPY      COLONOSCOPY N/A 7/29/2022 COLONOSCOPY performed by Niranjan Veronica MD at [address] [facility]         No Known Allergies    The patient has a family history that is negative for severe cardiovascular or respiratory issues, negative for reaction to anesthesia. Time spent reviewing past medical, surgical, social and family history, vitals, nursing assessment and images. No changes from above documented history. Social History     Socioeconomic History    Marital status: Single     Spouse name: Not on file    Number of children: Not on file    Years of education: Not on file    Highest education level: Not on file   Occupational History    Not on file   Tobacco Use    Smoking status: Some Days     Types: Cigarettes    Smokeless tobacco: Never    Tobacco comments:     1-2 cigs per week   Vaping Use    Vaping Use: Former    Substances: Nicotine, not ready to quit    Devices: Disposable   Substance and Sexual Activity    Alcohol use: Yes     Alcohol/week: 1.0 standard drink     Types: 1 Cans of beer per week     Comment: rarely    Drug use: Never    Sexual activity: Not Currently   Other Topics Concern    Not on file   Social History Narrative    ** Merged History Encounter **          Social Determinants of Health     Financial Resource Strain: Not on file   Food Insecurity: Not on file   Transportation Needs: Not on file   Physical Activity: Not on file   Stress: Not on file   Social Connections: Not on file   Intimate Partner Violence: Not on file   Housing Stability: Not on file       I have reviewed relevant labs from this admission and interpretation is included in my assessment and plan    Review of Systems    A complete 10 system review was performed and are otherwise negative unless mentioned in the above HPI. Specific negatives are listed below but may not include all those reviewed.     General ROS: negative obtundation, AMS  ENT ROS: negative rhinorrhea, epistaxis  Allergy and Immunology ROS: negative itchy/watery eyes or nasal congestion  Hematological and Lymphatic ROS: negative spontaneous bleeding or bruising  Endocrine ROS: negative  lethargy, mood swings, palpitations or polydipsia/polyuria  Respiratory ROS: negative sputum changes, stridor, tachypnea or wheezing  Cardiovascular ROS: negative for - loss of consciousness, murmur or orthopnea  Gastrointestinal ROS: negative for - hematochezia or hematemesis  Genito-Urinary ROS: negative for -  genital discharge or hematuria  Musculoskeletal ROS: negative for - focal weakness, gangrene  Psych/Neuro ROS: negative for - visual or auditory hallucinations, suicidal ideation    Physical exam:   /66   Pulse 90   Temp 98.5 °F (36.9 °C) (Infrared)   Resp 16   SpO2 99%   General appearance:  NAD, appears stated age  Head: NCAT, PERRLA, EOMI, red conjunctiva  Neck: supple, no masses, trachea midline  Lungs: Equal chest rise bilateral, no retractions, no wheezing  Heart: Reg rate  Abdomen: soft, tender mild left lower quadrant and suprapubic, super morbidly obese, nondistended  Skin; warm and dry, no cyanosis  Gu: no cva tenderness  Extremities: atraumatic, no focal motor deficits, no open wounds  Psych: No tremor, visual hallucinations      Radiology: I reviewed relevant abdominal imaging from this admission and that available in the EMR including CT abd/pel from 2/7/2023.  My assessment is acute sigmoid diverticulitis without perforation or abscess    Assessment:  Colton Rubalcava is a 20 y.o. male with recurrent sigmoid diverticulitis  Patient Active Problem List   Diagnosis    Other sprain of right middle finger, initial encounter    Tendinitis    Pain of finger of right hand    Perforated abdominal viscus    Diverticulitis of sigmoid colon    Morbid obesity ([address]    Perforation of sigmoid colon due to diverticulitis    Primary hypertension    Diverticulitis         Plan:  Proceed with lap robotic assisted sigmoid colectomy  The procedure, the risks, benefits, alternatives were discussed with the patient and his mother. They agree to proceed.         Solis Bruner MD  9:06 AM

## 2023-03-15 NOTE — ANESTHESIA POSTPROCEDURE EVALUATION
Department of Anesthesiology  Postprocedure Note    Patient: Judy Crowder MRN: [id number]  YOB: 2000  Date of evaluation: 3/15/2023      Procedure Summary     Date: 03/15/23 Room / Location: [address] [place] [address] / [address] [place] [address]    Anesthesia Start: 9009 Anesthesia Stop: 5983    Procedure: BOWEL RESECTION SIGMOID LAPAROSCOPIC ROBOTIC (Abdomen) Diagnosis:       Diverticulitis      (Diverticulitis [K57.92])    Surgeons: Fritzi Frankel, MD Responsible Provider: Rock Alonso DO    Anesthesia Type: General ASA Status: 3          Anesthesia Type: General    Hilda Phase I: Hilda Score: 10    Hilda Phase II:        Anesthesia Post Evaluation    Patient location during evaluation: bedside  Patient participation: complete - patient participated  Level of consciousness: awake  Pain score: 4  Airway patency: patent  Nausea & Vomiting: no vomiting and no nausea  Complications: no  Cardiovascular status: hemodynamically stable  Respiratory status: acceptable  Hydration status: stable  Comments: Seen and examined. Progressing as expected. No questions at this time.   Multimodal analgesia pain management approach

## 2023-03-15 NOTE — OP NOTE
Operative Note      Patient: Basil Tejada. YOB: 2000  MRN: [id number]    Date of Procedure: 3/15/2023    Pre-Op Diagnosis: Diverticulitis [K57.92]    Post-Op Diagnosis: Same       Procedure(s):  BOWEL RESECTION SIGMOID LAPAROSCOPIC ROBOTIC  MOBILIZATION OF SPLENIC FLEXURE    Surgeon(s):  Joseph Kaba MD    Assistant:   First Assistant: Billy Medico  Resident: Paz Fitzgerald MD    Anesthesia: General    Estimated Blood Loss (mL): less than 50     Complications: None    Specimens:   ID Type Source Tests Collected by Time Destination   A : sigmoid colon, 2 donuts Tissue Tissue SURGICAL PATHOLOGY Joseph Kaba MD 3/15/2023 1148        Implants:  * No implants in log *      Drains:   Urinary Catheter 03/15/23 (Active)       Findings: see op note    Detailed Description of Procedure:        DESCRIPTION OF PROCEDURE:  This is a 20 y.o. patient with a history of recurrent complicated diverticulitis. After being explained the risks, benefits and alternatives of the procedure, they agreed to proceed. They received a mechanical and antibiotic bowel prep. They were taken to the operating room, placed supine and administered general anesthesia and intubated. Once the airway was secured and she was adequately sedated, she was prepped and draped in normal sterile fashion. Time-out was performed to confirm the surgical site and the patient's name. They received preoperative antibiotics in the form of Rocephin and Flagyl. They had also received mechanical bowel prep as well as Forrester prep preoperatively. I initially made an 8-mm incision superior to the umbilicus on the right side of the umbilicus, inserted a Veress needle confirming needle placement, insufflated to 15 mmHg. I removed the Veress needle and inserted an 8-mm robotic trocar. Immediately upon entrance into the abdomen, I can appreciate there were adhesions to the midline extending down suprapubic.   We then inserted two more 8 mm trocars in the left upper quadrant under direct visualization. A 12 mm trocar was inserted in the right lower quadrant. At this point, I was able to dock the robot. The patient was placed in a steep Trendelenburg position with the right side slightly down in high lithotomy position. The sigmoid colon was still densely adhered to the anterior pelvic brim and the dome of the bladder. I used sharp dissection as well as the vessel sealing device and electrocautery in order to dissect the sigmoid colon away from the anterior pelvic brim. This was taken down until I could adequately dissect the anterior all the way down to the level of the peritoneal reflection and below. I then started a medial and lateral dissection starting at the base of the mesentery right at the sacral promontory and then dissecting the avascular plane posterior to the sigmoid colon dissecting down over the pelvic brim, down to the level of the levators posteriorly and then dissecting up 270 degrees on both sides of the colon. I was careful to preserve sacral perforating vessels and sacral nerve plexus. As we moved from medial to lateral, I did identify the left ureter on multiple occasions at several levels, keeping it safe and deep from my dissection. I identified the inferior mesenteric artery and vein. This was skeletonized. At this point, I could appreciate where we extent of the proximal area or the diverticular disease terminated, it was just proximal to the pelvic brim. I elected to take inferior mesenteric artery and pedicle with the vessel sealing device at this point and take the mesentery all the way up to an area where I planned proximal transection point. As I dissected laterally, I then moved from lateral to medial and then I connected my two areas of dissection again identifying the left ureter keeping it safe and deep from my dissection and did not injure it.   Then dissected all the way down to the left side of the sigmoid colon down into the pelvis. At this point, I was down below the peritoneal reflection. I took the peritoneum anteriorly off the sigmoid colon as well as using the vessel sealing device and at this point I found a healthy area of distal sigmoid colon. I was careful when dissecting across the puborectalis muscle to stay posterior to the prostate and preserve the nerve supply. Once I found and  the rectum and selected a transection point I used a single fire of the 60mm green load of the robotic stapler until I transected at this point just below the peritoneal reflection. At this point, we dissected up the white line of Toldt all the way up to the splenic flexure using the vessel sealing device. I completely mobilized the splenocolic attachment at the splenic flexure in order to allow enough length of the proximal colon to reach into the pelvis due to the patient body habitus and short mesentery. Again, I identified that area proximal where I planned my transection point. We then upsized the right lower quadrant trocar site with cautery and army/navy retractors and inserted a wound protector device. At this point, I inserted a 29 EEA anvil with a 2-0 Ethibond stitch attached to the tip of the harpooon. We then redocked the robot, performed a colotomy just distal to the side of my planned transection, inserted the anvil and extruded through the antimesenteric border using the sharp harpoon on the anvil. I then pursestringed it in place with a 2-0 Prolene stitch. The Prolene stitch was then used to close the enterotomy that had been made distally in the specimen. I then fired a single loads of the green 45 cm robotic stapler in order to transect just proximal to the colotomy site and distal to the anvil. At this point, I had completely transected our specimen, it was placed into the right side of the pelvis.       At this point, I had plenty of length and the anvil laid easily over the pelvic brim down right over the top of the rectum.  We then performed serial dilation from below using the 25 all the way up to the 30 mm dilators.  I then inserted a 29 EEA stapler into the rectum and performed a functional end-to-end anastomosis.  This laid tension free after firing the stapler and inspecting the donuts on the back table, which were both intact and whole complete donuts.  I then clamped across the bowel just above the pelvic brim with a tip up grasper and performed a flexible sigmoidoscopy insufflating the bowel and submersing under saline.  There was no evidence of any bubbling.  The anastomosis looked patent and viable.  I then injected 1 mL of IC green and was injected IV and could appreciate the entire anastomosis lighting up green consistent with good perfusion.      At this point, I suctioned out the pelvis to remove the colonoscope, decompressed the bowel and then grasped the end of the specimen with a locking grasper and undocked the robot.  We then eviscerated the specimen through the right lower quadrant, removed the wound protector device, irrigated out the subcutaneous space and then closed with #1 PDS suture running from medial to lateral and then tightened it completely.  We irrigated out the subcutaneous space and closed with 3-0 Vicryl dermal stitches and 4-0 Monocryl to run the skin close.  Each one of our trocar sites were closed with 4-0 Monocryl suture and surgical glue.       The patient was then awoken, extubated and transferred to the postoperative care unit in stable condition.  All instrument counts, lap counts, and needle counts were correct at the completion of the procedure.        Electronically signed by Malena Galloway MD on 3/15/2023 at 12:47 PM

## 2023-03-15 NOTE — CONSULTS
[facility]      Reason for Consult:  Medical management    History of Present Illness:  20 y.o. male with a history of DM, HTN, MILADY, GERD, obesity presents with abdominal pain, s/p sigmoid resection with general surgery. Patient states he has soreness at incision sites but otherwise is doing well. Denies fevers/chills or N/V. Was eating some sorbet at time of visit. Informant(s) for H&P: Patient, EMR review    REVIEW OF SYSTEMS:  Complete ROS performed with patient, pertinent positives and negatives are listed in the HPI. PMH:  Past Medical History:   Diagnosis Date    Arthritis     Asperger syndrome     Asthma     Diabetes mellitus ([facility] [address]     Prediabetic    Hypertension     Obesity     Sigmoid diverticulitis     Sleep apnea        Surgical History:  Past Surgical History:   Procedure Laterality Date    ADENOIDECTOMY      BOWEL RESECTION  03/15/2023    BOWEL RESECTION SIGMOID LAPAROSCOPIC ROBOTIC    COLONOSCOPY      COLONOSCOPY N/A 07/29/2022    COLONOSCOPY performed by Alvaro Gibson MD at [address] [facility]         Medications Prior to Admission:    Prior to Admission medications    Medication Sig Start Date End Date Taking?  Authorizing Provider   ondansetron (ZOFRAN-ODT) 4 MG disintegrating tablet Take 1 tablet by mouth 3 times daily as needed for Nausea or Vomiting 2/8/23   Suzan Barriga MD   albuterol sulfate HFA (PROVENTIL;VENTOLIN;PROAIR) 108 (90 Base) MCG/ACT inhaler Inhale 2 puffs into the lungs every 6 hours as needed for Wheezing or Shortness of Breath    Historical Provider, MD   lisinopril (PRINIVIL;ZESTRIL) 10 MG tablet Take 10 mg by mouth daily 2/1/22   Historical Provider, MD   topiramate (TOPAMAX) 50 MG tablet Take 50 mg by mouth daily  Patient not taking: No sig reported 12/7/21   Historical Provider, MD   cetirizine (ZYRTEC) 10 MG tablet Take 10 mg by mouth daily 2/1/22   Historical Provider, MD   vitamin D (ERGOCALCIFEROL) 1.25 MG (96428 UT) CAPS capsule Take 50,000 Units by mouth once a week Monday 12/7/21   Historical Provider, MD   famotidine (PEPCID) 40 MG tablet Take 40 mg by mouth daily   Patient not taking: No sig reported 3/18/20   Historical Provider, MD   sertraline (ZOLOFT) 25 MG tablet Take 25 mg by mouth daily  12/7/19   Historical Provider, MD       Allergies:    Patient has no known allergies. Social History:    reports that he has been smoking cigarettes. He has never used smokeless tobacco. He reports current alcohol use of about 1.0 standard drink per week. He reports that he does not use drugs. Family History:   family history is not on file. PHYSICAL EXAM:  Vitals:  /66   Pulse 100   Temp 98.4 °F (36.9 °C) (Oral)   Resp 18   Wt (!) 357 lb (161.9 kg)   SpO2 98%   BMI 55.91 kg/m²     General Appearance: alert and oriented to person, place and time and in no acute distress, obese  Skin: warm and dry, surgical incisions on abdomen  Head: normocephalic and atraumatic  Eyes: pupils equal, round, and reactive to light, extraocular eye movements intact, conjunctivae normal  Neck: neck supple and non tender without mass   Pulmonary/Chest: clear to auscultation bilaterally- no wheezes, rales or rhonchi, normal air movement, no respiratory distress  Cardiovascular: normal rate, normal S1 and S2 and no carotid bruits  Abdomen: soft, non-tender, non-distended, normal bowel sounds, no masses or organomegaly  Extremities: no cyanosis, no clubbing and no edema  Neurologic: no cranial nerve deficit and speech normal        LABS:  No results for input(s): NA, K, CL, CO2, BUN, CREATININE, GLUCOSE, CALCIUM in the last 72 hours. No results for input(s): WBC, RBC, HGB, HCT, MCV, MCH, MCHC, RDW, PLT, MPV in the last 72 hours. No results for input(s): POCGLU in the last 72 hours.       Radiology:   No orders to display       EKG:   Sinus tachycardia    ASSESSMENT:      Principal Problem:    Diverticulitis  Resolved Problems:    * No resolved hospital problems. *      PLAN:    Sigmoid diverticulitis  -Admitted to general surgery service, POD #0, s/p sigmoid resection  -On NS at 125ml/hr and started on CLD  -Antibiotics, DVT prophylaxis and pain control per primary service    HTN  -Home med lisinopril, continued  -BP well controlled    Prediabetes  -A1C 2021 5.7, will repeat    MILADY  -Patient noncompliant with CPAP    GERD  -Continue home famotidine      Thank you very much for this consult and we will continue to follow along. Electronically signed by Saman Foster MD on 3/15/2023 at 5:06 PM    NOTE: This report was transcribed using voice recognition software. Every effort was made to ensure accuracy; however, inadvertent computerized transcription errors may be present.

## 2023-03-16 LAB
ANION GAP SERPL CALCULATED.3IONS-SCNC: 15 MMOL/L (ref 7–16)
BASOPHILS # BLD: 0.02 E9/L (ref 0–0.2)
BASOPHILS NFR BLD: 0.1 % (ref 0–2)
BUN SERPL-MCNC: 12 MG/DL (ref 6–20)
CALCIUM SERPL-MCNC: 8.9 MG/DL (ref 8.6–10.2)
CHLORIDE SERPL-SCNC: 103 MMOL/L (ref 98–107)
CO2 SERPL-SCNC: 20 MMOL/L (ref 22–29)
CREAT SERPL-MCNC: 0.7 MG/DL (ref 0.7–1.2)
EOSINOPHIL # BLD: 0 E9/L (ref 0.05–0.5)
EOSINOPHIL NFR BLD: 0 % (ref 0–6)
ERYTHROCYTE [DISTWIDTH] IN BLOOD BY AUTOMATED COUNT: 13.2 FL (ref 11.5–15)
GLUCOSE SERPL-MCNC: 148 MG/DL (ref 74–99)
HBA1C MFR BLD: 5.7 % (ref 4–5.6)
HCT VFR BLD AUTO: 44.3 % (ref 37–54)
HGB BLD-MCNC: 13.7 G/DL (ref 12.5–16.5)
IMM GRANULOCYTES # BLD: 0.14 E9/L
IMM GRANULOCYTES NFR BLD: 0.9 % (ref 0–5)
LYMPHOCYTES # BLD: 1.16 E9/L (ref 1.5–4)
LYMPHOCYTES NFR BLD: 7.6 % (ref 20–42)
MCH RBC QN AUTO: 28.2 PG (ref 26–35)
MCHC RBC AUTO-ENTMCNC: 30.9 % (ref 32–34.5)
MCV RBC AUTO: 91.2 FL (ref 80–99.9)
METER GLUCOSE: 110 MG/DL (ref 74–99)
METER GLUCOSE: 125 MG/DL (ref 74–99)
METER GLUCOSE: 140 MG/DL (ref 74–99)
MONOCYTES # BLD: 0.57 E9/L (ref 0.1–0.95)
MONOCYTES NFR BLD: 3.8 % (ref 2–12)
NEUTROPHILS # BLD: 13.3 E9/L (ref 1.8–7.3)
NEUTS SEG NFR BLD: 87.6 % (ref 43–80)
PLATELET # BLD AUTO: 328 E9/L (ref 130–450)
PMV BLD AUTO: 9.7 FL (ref 7–12)
POTASSIUM SERPL-SCNC: 4.3 MMOL/L (ref 3.5–5)
RBC # BLD AUTO: 4.86 E12/L (ref 3.8–5.8)
SODIUM SERPL-SCNC: 138 MMOL/L (ref 132–146)
WBC # BLD: 15.2 E9/L (ref 4.5–11.5)

## 2023-03-16 PROCEDURE — 80048 BASIC METABOLIC PNL TOTAL CA: CPT

## 2023-03-16 PROCEDURE — 6370000000 HC RX 637 (ALT 250 FOR IP): Performed by: SURGERY

## 2023-03-16 PROCEDURE — 44207 L COLECTOMY/COLOPROCTOSTOMY: CPT | Performed by: SURGERY

## 2023-03-16 PROCEDURE — 99232 SBSQ HOSP IP/OBS MODERATE 35: CPT | Performed by: FAMILY MEDICINE

## 2023-03-16 PROCEDURE — 1200000000 HC SEMI PRIVATE

## 2023-03-16 PROCEDURE — 85025 COMPLETE CBC W/AUTO DIFF WBC: CPT

## 2023-03-16 PROCEDURE — 36415 COLL VENOUS BLD VENIPUNCTURE: CPT

## 2023-03-16 PROCEDURE — 83036 HEMOGLOBIN GLYCOSYLATED A1C: CPT

## 2023-03-16 PROCEDURE — 6360000002 HC RX W HCPCS: Performed by: SURGERY

## 2023-03-16 PROCEDURE — 82962 GLUCOSE BLOOD TEST: CPT

## 2023-03-16 PROCEDURE — 2580000003 HC RX 258: Performed by: SURGERY

## 2023-03-16 PROCEDURE — 44213 LAP MOBIL SPLENIC FL ADD-ON: CPT | Performed by: SURGERY

## 2023-03-16 RX ORDER — INSULIN LISPRO 100 [IU]/ML
0-8 INJECTION, SOLUTION INTRAVENOUS; SUBCUTANEOUS
Status: DISCONTINUED | OUTPATIENT
Start: 2023-03-16 | End: 2023-03-17 | Stop reason: HOSPADM

## 2023-03-16 RX ORDER — DEXTROSE MONOHYDRATE 100 MG/ML
INJECTION, SOLUTION INTRAVENOUS CONTINUOUS PRN
Status: DISCONTINUED | OUTPATIENT
Start: 2023-03-16 | End: 2023-03-17 | Stop reason: HOSPADM

## 2023-03-16 RX ORDER — INSULIN LISPRO 100 [IU]/ML
0-4 INJECTION, SOLUTION INTRAVENOUS; SUBCUTANEOUS NIGHTLY
Status: DISCONTINUED | OUTPATIENT
Start: 2023-03-16 | End: 2023-03-17 | Stop reason: HOSPADM

## 2023-03-16 RX ADMIN — OXYCODONE HYDROCHLORIDE 10 MG: 5 TABLET ORAL at 16:24

## 2023-03-16 RX ADMIN — TOPIRAMATE 50 MG: 25 TABLET, FILM COATED ORAL at 08:38

## 2023-03-16 RX ADMIN — FAMOTIDINE 20 MG: 20 TABLET, FILM COATED ORAL at 08:38

## 2023-03-16 RX ADMIN — ENOXAPARIN SODIUM 40 MG: 100 INJECTION SUBCUTANEOUS at 08:38

## 2023-03-16 RX ADMIN — SODIUM CHLORIDE: 9 INJECTION, SOLUTION INTRAVENOUS at 13:48

## 2023-03-16 RX ADMIN — CETIRIZINE HYDROCHLORIDE 10 MG: 10 TABLET, FILM COATED ORAL at 08:38

## 2023-03-16 RX ADMIN — LISINOPRIL 10 MG: 10 TABLET ORAL at 08:38

## 2023-03-16 RX ADMIN — Medication 10 ML: at 21:21

## 2023-03-16 RX ADMIN — ENOXAPARIN SODIUM 40 MG: 100 INJECTION SUBCUTANEOUS at 21:22

## 2023-03-16 RX ADMIN — SERTRALINE 25 MG: 50 TABLET, FILM COATED ORAL at 08:38

## 2023-03-16 ASSESSMENT — PAIN DESCRIPTION - DESCRIPTORS: DESCRIPTORS: ACHING

## 2023-03-16 ASSESSMENT — PAIN SCALES - GENERAL
PAINLEVEL_OUTOF10: 0
PAINLEVEL_OUTOF10: 7

## 2023-03-16 ASSESSMENT — PAIN DESCRIPTION - LOCATION: LOCATION: ABDOMEN

## 2023-03-16 ASSESSMENT — PAIN DESCRIPTION - ORIENTATION: ORIENTATION: RIGHT

## 2023-03-16 NOTE — PROGRESS NOTES
General Surgery Progress Note  [facility]    Patient's Name/Date of Birth: Charlotte Boyd. / 2000    Date: March 16, 2023     Surgeon: Whit Molina MD    Chief Complaint: s/p colectomy    Patient Active Problem List   Diagnosis    Other sprain of right middle finger, initial encounter    Tendinitis    Pain of finger of right hand    Perforated abdominal viscus    Diverticulitis of sigmoid colon    Morbid obesity ([address]    Perforation of sigmoid colon due to diverticulitis    Primary hypertension    Diverticulitis       Subjective: doing well. Pain well controlled tolerating clear liquids. No flatus yet. Objective:  BP (!) 155/84   Pulse 87   Temp 98.1 °F (36.7 °C) (Oral)   Resp 20   Wt (!) 357 lb (161.9 kg)   SpO2 100%   BMI 55.91 kg/m²   Labs:  Recent Labs     03/16/23  0256   WBC 15.2*   HGB 13.7   HCT 44.3     Lab Results   Component Value Date    CREATININE 0.7 03/16/2023    BUN 12 03/16/2023     03/16/2023    K 4.3 03/16/2023     03/16/2023    CO2 20 (L) 03/16/2023     No results for input(s): LIPASE, AMYLASE in the last 72 hours.   CBC with Differential:    Lab Results   Component Value Date/Time    WBC 15.2 03/16/2023 02:56 AM    RBC 4.86 03/16/2023 02:56 AM    HGB 13.7 03/16/2023 02:56 AM    HCT 44.3 03/16/2023 02:56 AM     03/16/2023 02:56 AM    MCV 91.2 03/16/2023 02:56 AM    MCH 28.2 03/16/2023 02:56 AM    MCHC 30.9 03/16/2023 02:56 AM    RDW 13.2 03/16/2023 02:56 AM    SEGSPCT 57 11/05/2011 08:40 AM    LYMPHOPCT 7.6 03/16/2023 02:56 AM    MONOPCT 3.8 03/16/2023 02:56 AM    BASOPCT 0.1 03/16/2023 02:56 AM    MONOSABS 0.57 03/16/2023 02:56 AM    LYMPHSABS 1.16 03/16/2023 02:56 AM    EOSABS 0.00 03/16/2023 02:56 AM    BASOSABS 0.02 03/16/2023 02:56 AM     CMP:    Lab Results   Component Value Date/Time     03/16/2023 02:56 AM    K 4.3 03/16/2023 02:56 AM     03/16/2023 02:56 AM    CO2 20 03/16/2023 02:56 AM    BUN 12 03/16/2023 02:56 AM    CREATININE 0.7 03/16/2023 02:56 AM    GFRAA >60 08/25/2022 01:23 AM    LABGLOM >60 03/16/2023 02:56 AM    GLUCOSE 148 03/16/2023 02:56 AM    GLUCOSE 93 11/05/2011 08:40 AM    PROT 7.4 02/08/2023 09:08 PM    LABALBU 3.9 02/08/2023 09:08 PM    LABALBU 4.9 11/05/2011 08:40 AM    CALCIUM 8.9 03/16/2023 02:56 AM    BILITOT 0.3 02/08/2023 09:08 PM    ALKPHOS 99 02/08/2023 09:08 PM    AST 21 02/08/2023 09:08 PM    ALT 21 02/08/2023 09:08 PM       General appearance:  NAD  Head: NCAT, PERRLA, EOMI, red conjunctiva  Neck: supple, no masses  Lungs: CTAB, equal chest rise bilateral  Heart: Reg rate  Abdomen: soft, nondistended, tender appropriately, incision C/D/I  Skin; no lesions  Gu: no cva tenderness  Extremities: extremities normal, atraumatic, no cyanosis or edema      Assessment/Plan:  Timmy Rosario is a 20 y.o. male POD 1 lap robotic sigmoid colectomy for diverticulitis, doing well    Full liquid diet  Ambulate   Pain control  Await bowel function  D/c russ Jama MD

## 2023-03-16 NOTE — PROGRESS NOTES
[facility] Progress Note    Admitting Date and Time: 3/15/2023  8:02 AM  Admit Dx: Diverticulitis [K57.92]    Subjective:  Patient is being followed for Diverticulitis [K57.92]     Pt denies complaints. Did well overnight. States had BM X 1 this AM.  No cough/wheezing/SOB  No fevers. Tolerating clears. Abd pain controlled. Ambulating. Per RN:  nothing to report    ROS: denies fever, chills, cp, sob, n/v, HA unless stated above.       pimozide  2 mg Oral Nightly    sertraline  25 mg Oral Daily    cetirizine  10 mg Oral Daily    [START ON 3/20/2023] vitamin D  50,000 Units Oral Weekly    famotidine  20 mg Oral Daily    lisinopril  10 mg Oral Daily    topiramate  50 mg Oral Daily    sodium chloride flush  5-40 mL IntraVENous 2 times per day    enoxaparin  40 mg SubCUTAneous BID     sodium chloride flush, 5-40 mL, PRN  sodium chloride, , PRN  ondansetron, 4 mg, Q8H PRN   Or  ondansetron, 4 mg, Q6H PRN  oxyCODONE, 5 mg, Q4H PRN   Or  oxyCODONE, 10 mg, Q4H PRN  morphine, 2 mg, Q2H PRN   Or  morphine, 4 mg, Q2H PRN  albuterol, 2.5 mg, Q6H PRN         Objective:    BP (!) 155/84   Pulse 87   Temp 98.1 °F (36.7 °C) (Oral)   Resp 20   Wt (!) 357 lb (161.9 kg)   SpO2 100%   BMI 55.91 kg/m²     General Appearance: alert and oriented to person, place and time and in no acute distress  Skin: warm and dry  Head: normocephalic and atraumatic  Eyes: pupils equal, round, and reactive to light, extraocular eye movements intact, conjunctivae normal  Neck: neck supple and non tender without mass   Pulmonary/Chest: clear to auscultation bilaterally- no wheezes, rales or rhonchi, normal air movement, no respiratory distress  Cardiovascular: normal rate, normal S1 and S2 and no carotid bruits  Abdomen: soft, non-tender, obese, non-distended, normal bowel sounds, no masses or organomegaly, 3 laparoscopic wounds C/D/I  Extremities: no cyanosis, no clubbing and no edema  Neurologic: no cranial nerve deficit and speech normal        Recent Labs     03/16/23  0256      K 4.3      CO2 20*   BUN 12   CREATININE 0.7   GLUCOSE 148*   CALCIUM 8.9       Recent Labs     03/16/23  0256   WBC 15.2*   RBC 4.86   HGB 13.7   HCT 44.3   MCV 91.2   MCH 28.2   MCHC 30.9*   RDW 13.2      MPV 9.7       Radiology:   None new    Assessment:    Principal Problem:    Diverticulitis  Resolved Problems:    * No resolved hospital problems. *      Plan:  Sigmoid diverticulitis  -Admitted to general surgery service, POD # 1, s/p sigmoid resection  -Diet advanced to fulls per Dr. Marisela Palacios  -Antibiotics, DVT prophylaxis and pain control per primary service     HTN -- fairly well controlled  -Home med lisinopril, continued  -Trend BP      Prediabetes -- controlled  -A1C 2021 5.7, will repeat -- pending  -Glucose 148 fasting on this AM's blood work  -Start SSI protocol     MILADY  -Patient noncompliant with CPAP     GERD  -Continue home famotidine        Thank you very much for this consult and we will continue to follow along. NOTE: This report was transcribed using voice recognition software. Every effort was made to ensure accuracy; however, inadvertent computerized transcription errors may be present.   Electronically signed by Arturo Thomas DO on 3/16/2023 at 11:46 AM no

## 2023-03-16 NOTE — PLAN OF CARE
Problem: Discharge Planning  Goal: Discharge to home or other facility with appropriate resources  Outcome: Progressing  Flowsheets (Taken 3/15/2023 1416 by Shahram Vela RN)  Discharge to home or other facility with appropriate resources:   Identify barriers to discharge with patient and caregiver   Identify discharge learning needs (meds, wound care, etc)     Problem: Chronic Conditions and Co-morbidities  Goal: Patient's chronic conditions and co-morbidity symptoms are monitored and maintained or improved  Outcome: Progressing     Problem: Pain  Goal: Verbalizes/displays adequate comfort level or baseline comfort level  Outcome: Progressing     Problem: ABCDS Injury Assessment  Goal: Absence of physical injury  Outcome: Progressing     Problem: Safety - Adult  Goal: Free from fall injury  Outcome: Progressing

## 2023-03-16 NOTE — CARE COORDINATION
CM note: chart reviewed for transition of care planning as pt is low risk for needing CM intervention/discharge planning. Pt had a resection for diverticulitis. Awaiting return of bowel function and patient will return home.

## 2023-03-17 VITALS
WEIGHT: 315 LBS | BODY MASS INDEX: 49.44 KG/M2 | DIASTOLIC BLOOD PRESSURE: 90 MMHG | SYSTOLIC BLOOD PRESSURE: 150 MMHG | HEART RATE: 69 BPM | RESPIRATION RATE: 18 BRPM | TEMPERATURE: 98.3 F | HEIGHT: 67 IN | OXYGEN SATURATION: 100 %

## 2023-03-17 LAB
ANION GAP SERPL CALCULATED.3IONS-SCNC: 10 MMOL/L (ref 7–16)
BASOPHILS # BLD: 0.07 E9/L (ref 0–0.2)
BASOPHILS NFR BLD: 0.5 % (ref 0–2)
BUN SERPL-MCNC: 13 MG/DL (ref 6–20)
CALCIUM SERPL-MCNC: 8.9 MG/DL (ref 8.6–10.2)
CHLORIDE SERPL-SCNC: 101 MMOL/L (ref 98–107)
CO2 SERPL-SCNC: 24 MMOL/L (ref 22–29)
CREAT SERPL-MCNC: 0.8 MG/DL (ref 0.7–1.2)
EOSINOPHIL # BLD: 0.02 E9/L (ref 0.05–0.5)
EOSINOPHIL NFR BLD: 0.1 % (ref 0–6)
ERYTHROCYTE [DISTWIDTH] IN BLOOD BY AUTOMATED COUNT: 13.7 FL (ref 11.5–15)
GLUCOSE SERPL-MCNC: 106 MG/DL (ref 74–99)
HCT VFR BLD AUTO: 44.6 % (ref 37–54)
HGB BLD-MCNC: 13.4 G/DL (ref 12.5–16.5)
IMM GRANULOCYTES # BLD: 0.2 E9/L
IMM GRANULOCYTES NFR BLD: 1.4 % (ref 0–5)
LYMPHOCYTES # BLD: 3.49 E9/L (ref 1.5–4)
LYMPHOCYTES NFR BLD: 24.3 % (ref 20–42)
MCH RBC QN AUTO: 28.5 PG (ref 26–35)
MCHC RBC AUTO-ENTMCNC: 30 % (ref 32–34.5)
MCV RBC AUTO: 94.7 FL (ref 80–99.9)
METER GLUCOSE: 105 MG/DL (ref 74–99)
MONOCYTES # BLD: 1.3 E9/L (ref 0.1–0.95)
MONOCYTES NFR BLD: 9 % (ref 2–12)
NEUTROPHILS # BLD: 9.31 E9/L (ref 1.8–7.3)
NEUTS SEG NFR BLD: 64.7 % (ref 43–80)
PLATELET # BLD AUTO: 311 E9/L (ref 130–450)
PMV BLD AUTO: 9.6 FL (ref 7–12)
POTASSIUM SERPL-SCNC: 4 MMOL/L (ref 3.5–5)
RBC # BLD AUTO: 4.71 E12/L (ref 3.8–5.8)
SODIUM SERPL-SCNC: 135 MMOL/L (ref 132–146)
WBC # BLD: 14.4 E9/L (ref 4.5–11.5)

## 2023-03-17 PROCEDURE — 6360000002 HC RX W HCPCS: Performed by: SURGERY

## 2023-03-17 PROCEDURE — 82962 GLUCOSE BLOOD TEST: CPT

## 2023-03-17 PROCEDURE — 85025 COMPLETE CBC W/AUTO DIFF WBC: CPT

## 2023-03-17 PROCEDURE — 6370000000 HC RX 637 (ALT 250 FOR IP): Performed by: SURGERY

## 2023-03-17 PROCEDURE — 80048 BASIC METABOLIC PNL TOTAL CA: CPT

## 2023-03-17 PROCEDURE — 36415 COLL VENOUS BLD VENIPUNCTURE: CPT

## 2023-03-17 RX ORDER — METHOCARBAMOL 750 MG/1
750 TABLET, FILM COATED ORAL 4 TIMES DAILY
Qty: 40 TABLET | Refills: 0 | Status: SHIPPED | OUTPATIENT
Start: 2023-03-17 | End: 2023-03-27

## 2023-03-17 RX ORDER — METHOCARBAMOL 750 MG/1
750 TABLET, FILM COATED ORAL 4 TIMES DAILY
Qty: 40 TABLET | Refills: 0 | Status: SHIPPED | OUTPATIENT
Start: 2023-03-17 | End: 2023-03-17 | Stop reason: SDUPTHER

## 2023-03-17 RX ORDER — HYDROCODONE BITARTRATE AND ACETAMINOPHEN 5; 325 MG/1; MG/1
1 TABLET ORAL EVERY 6 HOURS PRN
Qty: 28 TABLET | Refills: 0 | Status: SHIPPED | OUTPATIENT
Start: 2023-03-17 | End: 2023-03-24

## 2023-03-17 RX ORDER — HYDROCODONE BITARTRATE AND ACETAMINOPHEN 5; 325 MG/1; MG/1
1 TABLET ORAL EVERY 6 HOURS PRN
Qty: 28 TABLET | Refills: 0 | Status: SHIPPED | OUTPATIENT
Start: 2023-03-17 | End: 2023-03-17 | Stop reason: SDUPTHER

## 2023-03-17 RX ADMIN — SERTRALINE 25 MG: 50 TABLET, FILM COATED ORAL at 08:32

## 2023-03-17 RX ADMIN — ENOXAPARIN SODIUM 40 MG: 100 INJECTION SUBCUTANEOUS at 08:31

## 2023-03-17 RX ADMIN — LISINOPRIL 10 MG: 10 TABLET ORAL at 08:32

## 2023-03-17 RX ADMIN — FAMOTIDINE 20 MG: 20 TABLET, FILM COATED ORAL at 08:32

## 2023-03-17 RX ADMIN — CETIRIZINE HYDROCHLORIDE 10 MG: 10 TABLET, FILM COATED ORAL at 08:32

## 2023-03-17 RX ADMIN — TOPIRAMATE 50 MG: 25 TABLET, FILM COATED ORAL at 08:32

## 2023-03-17 ASSESSMENT — PAIN SCALES - GENERAL
PAINLEVEL_OUTOF10: 3
PAINLEVEL_OUTOF10: 0

## 2023-03-17 NOTE — DISCHARGE SUMMARY
Physician Discharge Summary     Patient ID:  Charlotte Boyd  [id number]  20 y.o.  2000    Admit date: 3/15/2023    Discharge date and time: No discharge date for patient encounter. Admitting Physician: Uriel Gonzalez MD     Admission Diagnoses: Diverticulitis [K57.92]    Discharge Diagnoses: Principal Problem:    Diverticulitis  Resolved Problems:    * No resolved hospital problems. *      Admission Condition: good    Discharged Condition: stable      Hospital Course:  Charlotte Francis is a 20 y.o. male who presented for elective sigmoid resection for history of diverticulitis. He had robotic laparoscopic sigmoidectomy on 3/15. The patient's course was otherwise uneventful. He progressed well, pain was controlled on PO medications. He was tolerating a regular diet with no nausea or vomiting, and was in a suitable condition for discharge to home in stable condition. Consults:   IP CONSULT TO HOSPITALIST    Significant Diagnostic Studies:   No results found. Discharge Exam:     General appearance:  NAD  Head: NCAT, PERRLA, EOMI, red conjunctiva  Neck: supple, no masses  Lungs: CTAB, equal chest rise bilateral  Heart: Reg rate  Abdomen: soft, nondistended, tender appropriately, incision C/D/I  Skin; no lesions  Gu: no cva tenderness  Extremities: extremities normal, atraumatic, no cyanosis or edema    Disposition: home    In process/preliminary results:  Outstanding Order Results       Date and Time Order Name Status Description    3/15/2023 12:00 AM Surgical Pathology In process             Patient Instructions:   Current Discharge Medication List        START taking these medications    Details   methocarbamol (ROBAXIN-750) 750 MG tablet Take 1 tablet by mouth 4 times daily for 10 days  Qty: 40 tablet, Refills: 0      HYDROcodone-acetaminophen (NORCO) 5-325 MG per tablet Take 1 tablet by mouth every 6 hours as needed for Pain for up to 7 days. Intended supply: 5 days.  Take lowest dose possible to manage pain Max Daily Amount: 4 tablets  Qty: 28 tablet, Refills: 0    Comments: Reduce doses taken as pain becomes manageable  Associated Diagnoses: Diverticulitis; Postoperative pain           CONTINUE these medications which have NOT CHANGED    Details   ondansetron (ZOFRAN-ODT) 4 MG disintegrating tablet Take 1 tablet by mouth 3 times daily as needed for Nausea or Vomiting  Qty: 21 tablet, Refills: 0      albuterol sulfate HFA (PROVENTIL;VENTOLIN;PROAIR) 108 (90 Base) MCG/ACT inhaler Inhale 2 puffs into the lungs every 6 hours as needed for Wheezing or Shortness of Breath      lisinopril (PRINIVIL;ZESTRIL) 10 MG tablet Take 10 mg by mouth daily      topiramate (TOPAMAX) 50 MG tablet Take 50 mg by mouth daily      cetirizine (ZYRTEC) 10 MG tablet Take 10 mg by mouth daily       vitamin D (ERGOCALCIFEROL) 1.25 MG (00038 UT) CAPS capsule Take 50,000 Units by mouth once a week Monday      famotidine (PEPCID) 40 MG tablet Take 40 mg by mouth daily       sertraline (ZOLOFT) 25 MG tablet Take 25 mg by mouth daily            STOP taking these medications       GAVILYTE-G 236 g solution Comments:   Reason for Stopping:         neomycin (MYCIFRADIN) 500 MG tablet Comments:   Reason for Stopping:         erythromycin base (E-MYCIN) 500 MG tablet Comments:   Reason for Stopping:         pimozide (ORAP) 2 MG tablet Comments:   Reason for Stopping:                    Follow up:   Jayla Jama MD  [address]  [phone]    Follow up  For follow up       Signed:  Lory Willett DO  3/17/2023  7:17 AM

## 2023-03-17 NOTE — PROGRESS NOTES
GENERAL SURGERY  DAILY PROGRESS NOTE  3/17/2023    Cc: postop pain    Subjective:  Doing well, wants to go home. Toleraitng regular diet and having bowel movements.   Voiding post solano removal    Objective:  /80   Pulse 83   Temp 97.8 °F (36.6 °C) (Oral)   Resp 17   Ht 5' 7\" (1.702 m)   Wt (!) 357 lb (161.9 kg)   SpO2 100%   BMI 55.91 kg/m²     GENERAL:  Laying in bed, awake, alert, cooperative, no apparent distress  HEAD: Normocephalic, atraumatic  EYES: No sclera icterus, pupils equal  LUNGS:  No increased work of breathing  CARDIOVASCULAR:  regular rate  ABDOMEN:  Soft, appropriate incisional tenderness, non-distended  EXTREMITIES: No edema or swelling  SKIN: Warm and dry, no rashes or lesions    Assessment/Plan:  20 y.o. male is POD 2 lap robotic sigmoid colectomy for diverticulitis    Progressing well  Tolerating regular diet, having bowel function  Pain control  Anticipate DC today    Electronically signed by Deuce Bolaños DO on 3/17/2023 at 6:53 AM

## 2023-03-17 NOTE — PLAN OF CARE
Problem: Discharge Planning  Goal: Discharge to home or other facility with appropriate resources  Outcome: Progressing     Problem: Chronic Conditions and Co-morbidities  Goal: Patient's chronic conditions and co-morbidity symptoms are monitored and maintained or improved  Outcome: Progressing     Problem: Pain  Goal: Verbalizes/displays adequate comfort level or baseline comfort level  Outcome: Progressing     Problem: ABCDS Injury Assessment  Goal: Absence of physical injury  Outcome: Progressing     Problem: Safety - Adult  Goal: Free from fall injury  Outcome: Progressing

## 2023-03-17 NOTE — DISCHARGE INSTRUCTIONS
Patient Discharge Instructions  RESUME ACTIVITY:      BATHING:  May shower 24hrs after surgery, remove dressings after 24hrs if in place, leave steristrips in place as they will fall of independently. You may have adhesive glue covering your incisions which will dissolve on it own. May bathe or swim 5 days after surgery    DRIVING: No driving while on pain medications    RETURN TO WORK: after follow up appointment    WALKING:  Yes    SEXUAL ACTIVITY: Yes    STAIRS:  Yes    LIFTING: Less than 15 pounds for 4 weeks    DIET: General adult    SPECIAL INSTRUCTIONS:   Call physician if they or any other problems occur:  Fever over 101°    Redness, swelling, hardness or warmth at the operative site  Unrelieved nausea    Foul smelling or cloudy drainage at the operative site   Unrelieved pain    Blood soaked dressing. (Some oozing may be normal)    Call office for follow up appointment with Dr Brandie Marroquin in 2 weeks. Call the office at [phone] if you have a fever > 100 F, or if your incision becomes red, tender, or drains more than a small amount of clear fluid. BOWELS: constipation is a side effect of your pain meds, take a daily laxative (miralax, dulcolax, etc.) as needed to keep your bowels moving as they normally do, do not go 2-3 days without having a bowel movement. Pain medications: Percocet- take at least 1/2 pill every 6 hours the first 36 hours after surgery, and may take as many as 2 pills every 4 hours. After the first 36hours only take the pills as needed and stop them as soon as possible. Pain meds cause constipation so pay close attention to the \"bowels\" topic above. Keep incisions clean and dry. Vicodin/Percocet and ibuprofen for pain as prescribed. Okay to resume anticoagulant medication after 24hrs. Do not exceed 4000mg of Tylenol/Acetaminophen per day. Vicodin/Norco/Percocet contain acetaminophen. Do not take additional amounts of Tylenol if you are taking these medications.     Your information:  Name: Colton Riddle. : 2000    Your instructions:  Discharge Home    The following personal items were collected during your admission and were returned to you:    Belongings  Dental Appliances: None  Vision - Corrective Lenses: Eyeglasses  Hearing Aid: None  Clothing: Footwear, Undergarments, Shirt, Shorts, Socks  Jewelry: None  Body Piercings Removed: N/A  Electronic Devices: Cell Phone  Weapons (Notify Protective Services/Security): None  Other Valuables: Wallet  Home Medications: None  Valuables Given To: Patient  Provide Name(s) of Who Valuable(s) Were Given To: NA  Responsible person(s) in the waiting room: NA  Patient approves for provider to speak to responsible person post operatively: Yes    Information obtained by:  By signing below, I understand that if any problems occur once I leave the hospital I am to contact PCP. I understand and acknowledge receipt of the instructions indicated above. Laparoscopic Bowel Resection: What to Expect at [place] [address]. had part of your small or large intestine taken out. You are likely to have pain that comes and goes for the next few days. After a laparoscopy, you may have shoulder pain. This is caused by the air your doctor put in your belly to help see your organs better. The pain may last for a day or two. You may feel like you have the flu. You also may have a low fever and feel tired and nauseated. This is common. You should feel better after 1 to 2 weeks and will probably be back to normal in 2 to 4 weeks. Your bowel movements may not be regular for several weeks. Also, you may have some blood in your stool. This care sheet gives you a general idea about how long it will take for you to recover. But each person recovers at a different pace. Follow the steps below to get better as quickly as possible. How can you care for yourself at home? Activity    Rest when you feel tired.  Getting enough sleep will help you recover. Try to walk each day. Start by walking a little more than you did the day before. Bit by bit, increase the amount you walk. Walking boosts blood flow and helps prevent pneumonia and constipation. Avoid strenuous activities, such as biking, jogging, weight lifting, or aerobic exercise, until your doctor says it is okay. Avoid lifting anything that would make you strain. This may include heavy grocery bags and milk containers, a heavy briefcase or backpack, cat litter or dog food bags, a vacuum , or a child. Ask your doctor when you can drive again. You will probably need to take 2 to 4 weeks off from work. It depends on the type of work you do and how you feel. You may shower 24 to 48 hours after surgery, if your doctor says it is okay. Pat the cut (incision) dry. Do not take a bath for the first 2 weeks, or until your doctor tells you it is okay. Ask your doctor when it is okay for you to have sex. Diet    You may not have much appetite after the surgery. But try to eat a healthy diet. Your doctor will tell you about any foods you should not eat. Eat a low-fiber diet for several weeks after surgery. Eat many small meals throughout the day. Add high-fiber foods a little at a time. Eat yogurt. It puts good bacteria into your colon and helps prevent diarrhea. Try to avoid nuts, seeds, and corn for a while. They may be hard to digest.     You may need to take vitamins that contain sodium and potassium. Your doctor will tell you whether you should take any vitamins or supplements. Drink plenty of fluids to prevent dehydration. Choose water and other clear liquids until you feel better. If you have kidney, heart, or liver disease and have to limit fluids, talk with your doctor before you increase the amount of fluids you drink. Medicines    Your doctor will tell you if and when you can restart your medicines.  You will also be given instructions about taking any new medicines. If you stopped taking aspirin or some other blood thinner, your doctor will tell you when to start taking it again. Take pain medicines exactly as directed. If the doctor gave you a prescription medicine for pain, take it as prescribed. If you are not taking a prescription pain medicine, ask your doctor if you can take an over-the-counter medicine. If your doctor prescribed antibiotics, take them as directed. Do not stop taking them just because you feel better. You need to take the full course of antibiotics. You may need to take some medicines in a different form. You will be told whether to crush pills or take a liquid form of the medicine. If your doctor gives you a stool softener, take it as directed. Incision care    If you have strips of tape on the incisions, leave the tape on for a week or until it falls off. Wash the area daily with warm, soapy water and pat it dry. Don't use hydrogen peroxide or alcohol, which can slow healing. You may cover the area with a gauze bandage if it weeps or rubs against clothing. Change the bandage every day. Follow-up care is a key part of your treatment and safety. Be sure to make and go to all appointments, and call your doctor if you are having problems. It's also a good idea to know your test results and keep a list of the medicines you take. When should you call for help? Call 911 anytime you think you may need emergency care. For example, call if:    You passed out (lost consciousness). You are short of breath. Call your doctor now or seek immediate medical care if:    You have pain that does not get better after you take pain medicine. You cannot pass stool or gas. You are sick to your stomach and cannot keep fluids down. Bright red blood has soaked through your bandage. You have loose stitches, or your incision comes open.      You have signs of a blood clot in your leg (called a deep vein thrombosis), such as:  Pain in your calf, back of the knee, thigh, or groin. Redness and swelling in your leg or groin. You have signs of infection, such as: Increased pain, swelling, warmth, or redness. Red streaks leading from the incision. Pus draining from the incision. A fever. Watch closely for any changes in your health, and be sure to contact your doctor if you have any problems. Where can you learn more? Go to http://www.woods.com/ and enter H191 to learn more about \"Laparoscopic Bowel Resection: What to Expect at Home. \"  Current as of: June 6, 2022               Content Version: 13.6  © 2006-2023 [employer]. Care instructions adapted under license by [facility] [place] ([facility]). If you have questions about a medical condition or this instruction, always ask your healthcare professional. [place] [address] any warranty or liability for your use of this information.

## 2023-04-05 ENCOUNTER — OFFICE VISIT (OUTPATIENT)
Dept: SURGERY | Age: 23
End: 2023-04-05

## 2023-04-05 VITALS
SYSTOLIC BLOOD PRESSURE: 132 MMHG | DIASTOLIC BLOOD PRESSURE: 78 MMHG | TEMPERATURE: 98.3 F | BODY MASS INDEX: 49.44 KG/M2 | HEIGHT: 67 IN | WEIGHT: 315 LBS | HEART RATE: 80 BPM

## 2023-04-05 DIAGNOSIS — Z09 POSTOPERATIVE EXAMINATION: Primary | ICD-10-CM

## 2023-04-05 PROCEDURE — 99024 POSTOP FOLLOW-UP VISIT: CPT | Performed by: SURGERY

## 2023-04-06 NOTE — PROGRESS NOTES
General Surgery Office Note  [facility] Surgery  Consandre P. Ulysses Rave, MD    Patient's Name/Date of Birth: Elinor Lozano. / 2000    Date: April 6, 2023     Surgeon: Ulysses Rave, MD    Chief Complaint:   Chief Complaint   Patient presents with    Post-Op Check     Sigmoid colectomy       Patient Active Problem List   Diagnosis    Other sprain of right middle finger, initial encounter    Tendinitis    Pain of finger of right hand    Perforated abdominal viscus    Diverticulitis of sigmoid colon    Morbid obesity ([address]    Perforation of sigmoid colon due to diverticulitis    Primary hypertension    Diverticulitis       Subjective: Doing well. Has had no pain since surgery. Pain from prior to surgery is completely resolved. Objective:  /78   Pulse 80   Temp 98.3 °F (36.8 °C)   Ht 5' 7\" (1.702 m)   Wt (!) 344 lb (156 kg)   BMI 53.88 kg/m²   Labs:  No results for input(s): WBC, HGB, HCT in the last 72 hours. Invalid input(s): PLR  Lab Results   Component Value Date    CREATININE 0.8 03/17/2023    BUN 13 03/17/2023     03/17/2023    K 4.0 03/17/2023     03/17/2023    CO2 24 03/17/2023     No results for input(s): LIPASE, AMYLASE in the last 72 hours. General appearance: AA, NAD  HEENT: NCAT, PERRLA, EOMI  Lungs: Clear, equal rise bilateral  Heart: Reg  Abdomen: soft, nondistended, nontender, incisions well healed, no signs of infection, no cellulitis, no hematoma  Skin: No lesions, incisions well healed  Psych: No distress, conversive, no hallucinations  : No ulcers or lesions  Rectal: No bleeding    A complete 10 system review was performed and are otherwise negative unless mentioned in the above HPI. Specific negatives are listed below but may not include all those reviewed.     General ROS: negative obtundation, AMS  ENT ROS: negative rhinorrhea, epistaxis  Allergy and Immunology ROS: negative itchy/watery eyes or nasal congestion  Hematological and Lymphatic ROS: negative

## 2023-05-13 ENCOUNTER — HOSPITAL ENCOUNTER (EMERGENCY)
Age: 23
Discharge: HOME | End: 2023-05-13
Attending: EMERGENCY MEDICINE
Payer: COMMERCIAL

## 2023-05-13 VITALS
SYSTOLIC BLOOD PRESSURE: 144 MMHG | BODY MASS INDEX: 49.44 KG/M2 | RESPIRATION RATE: 18 BRPM | HEIGHT: 67 IN | TEMPERATURE: 97.8 F | DIASTOLIC BLOOD PRESSURE: 89 MMHG | OXYGEN SATURATION: 98 % | HEART RATE: 90 BPM | WEIGHT: 315 LBS

## 2023-05-13 DIAGNOSIS — J06.9 ACUTE UPPER RESPIRATORY INFECTION: ICD-10-CM

## 2023-05-13 DIAGNOSIS — R53.83 OTHER FATIGUE: Primary | ICD-10-CM

## 2023-05-13 PROCEDURE — 99282 EMERGENCY DEPT VISIT SF MDM: CPT

## 2023-05-13 ASSESSMENT — ENCOUNTER SYMPTOMS
TROUBLE SWALLOWING: 0
RHINORRHEA: 0
SHORTNESS OF BREATH: 0
ABDOMINAL PAIN: 0
BACK PAIN: 0
NAUSEA: 0
SORE THROAT: 0
VOICE CHANGE: 0
CHEST TIGHTNESS: 0
SINUS PRESSURE: 1
DIARRHEA: 0
WHEEZING: 0
VOMITING: 0
SINUS PAIN: 0

## 2023-05-13 ASSESSMENT — PAIN - FUNCTIONAL ASSESSMENT: PAIN_FUNCTIONAL_ASSESSMENT: NONE - DENIES PAIN

## 2023-05-13 NOTE — ED NOTES
Patient discharged, all questions answered, all medications and pain management discussed. Patient will return if any problems arise.         Hilario Amin RN  05/13/23 [id number]

## 2023-05-13 NOTE — ED PROVIDER NOTES
outpatient follow-up.                 --------------------------------------------- PAST HISTORY ---------------------------------------------  Past Medical History:  has a past medical history of Arthritis, Asperger syndrome, Asthma, Diabetes mellitus ([facility] [address] Hypertension, Obesity, Sigmoid diverticulitis, and Sleep apnea. Past Surgical History:  has a past surgical history that includes Tonsillectomy; Adenoidectomy; [place] tooth extraction; Colonoscopy; Colonoscopy (N/A, 07/29/2022); Bowel resection (03/15/2023); and colectomy (N/A, 3/15/2023). Social History:  reports that he has been smoking cigarettes. He has never used smokeless tobacco. He reports current alcohol use of about 1.0 standard drink per week. He reports that he does not use drugs. Family History: family history is not on file. The patients home medications have been reviewed. Allergies: Patient has no known allergies. -------------------------------------------------- RESULTS -------------------------------------------------  Labs:  No results found for this visit on 05/13/23. Radiology:  No orders to display       ------------------------- NURSING NOTES AND VITALS REVIEWED ---------------------------  Date / Time Roomed:  5/13/2023 10:37 AM  ED Bed Assignment:  [id number]    The nursing notes within the ED encounter and vital signs as below have been reviewed. BP (!) 144/89   Pulse 90   Temp 97.8 °F (36.6 °C) (Infrared)   Resp 18   Ht 5' 7\" (1.702 m)   Wt (!) 340 lb (154.2 kg)   SpO2 98%   BMI 53.25 kg/m²   Oxygen Saturation Interpretation: Normal      ------------------------------------------ PROGRESS NOTES ------------------------------------------  I have spoken with the patient and discussed todays results, in addition to providing specific details for the plan of care and counseling regarding the diagnosis and prognosis. Their questions are answered at this time and they are agreeable with the plan.  I

## 2023-06-03 ENCOUNTER — APPOINTMENT (OUTPATIENT)
Dept: GENERAL RADIOLOGY | Age: 23
End: 2023-06-03
Payer: COMMERCIAL

## 2023-06-03 ENCOUNTER — HOSPITAL ENCOUNTER (EMERGENCY)
Age: 23
Discharge: HOME | End: 2023-06-03
Attending: STUDENT IN AN ORGANIZED HEALTH CARE EDUCATION/TRAINING PROGRAM
Payer: COMMERCIAL

## 2023-06-03 VITALS
TEMPERATURE: 97.7 F | OXYGEN SATURATION: 98 % | HEART RATE: 87 BPM | DIASTOLIC BLOOD PRESSURE: 76 MMHG | SYSTOLIC BLOOD PRESSURE: 135 MMHG | WEIGHT: 315 LBS | BODY MASS INDEX: 49.44 KG/M2 | HEIGHT: 67 IN | RESPIRATION RATE: 16 BRPM

## 2023-06-03 DIAGNOSIS — S39.012A STRAIN OF LUMBAR REGION, INITIAL ENCOUNTER: Primary | ICD-10-CM

## 2023-06-03 PROCEDURE — 99284 EMERGENCY DEPT VISIT MOD MDM: CPT

## 2023-06-03 PROCEDURE — 6360000002 HC RX W HCPCS: Performed by: STUDENT IN AN ORGANIZED HEALTH CARE EDUCATION/TRAINING PROGRAM

## 2023-06-03 PROCEDURE — 96372 THER/PROPH/DIAG INJ SC/IM: CPT

## 2023-06-03 PROCEDURE — 72110 X-RAY EXAM L-2 SPINE 4/>VWS: CPT

## 2023-06-03 RX ORDER — NAPROXEN 500 MG/1
500 TABLET ORAL 2 TIMES DAILY
Qty: 60 TABLET | Refills: 0 | Status: SHIPPED | OUTPATIENT
Start: 2023-06-03

## 2023-06-03 RX ORDER — KETOROLAC TROMETHAMINE 30 MG/ML
30 INJECTION, SOLUTION INTRAMUSCULAR; INTRAVENOUS ONCE
Status: COMPLETED | OUTPATIENT
Start: 2023-06-03 | End: 2023-06-03

## 2023-06-03 RX ORDER — METHOCARBAMOL 750 MG/1
750 TABLET, FILM COATED ORAL 4 TIMES DAILY
Qty: 40 TABLET | Refills: 0 | Status: SHIPPED | OUTPATIENT
Start: 2023-06-03 | End: 2023-06-13

## 2023-06-03 RX ORDER — ORPHENADRINE CITRATE 30 MG/ML
60 INJECTION INTRAMUSCULAR; INTRAVENOUS ONCE
Status: COMPLETED | OUTPATIENT
Start: 2023-06-03 | End: 2023-06-03

## 2023-06-03 RX ORDER — KETOROLAC TROMETHAMINE 30 MG/ML
30 INJECTION, SOLUTION INTRAMUSCULAR; INTRAVENOUS ONCE
Status: DISCONTINUED | OUTPATIENT
Start: 2023-06-03 | End: 2023-06-03

## 2023-06-03 RX ORDER — LIDOCAINE 4 G/G
1 PATCH TOPICAL DAILY
Qty: 10 EACH | Refills: 0 | Status: SHIPPED | OUTPATIENT
Start: 2023-06-03 | End: 2023-06-13

## 2023-06-03 RX ADMIN — ORPHENADRINE CITRATE 60 MG: 30 INJECTION INTRAMUSCULAR; INTRAVENOUS at 03:44

## 2023-06-03 RX ADMIN — KETOROLAC TROMETHAMINE 30 MG: 30 INJECTION, SOLUTION INTRAMUSCULAR at 03:11

## 2023-06-03 ASSESSMENT — PAIN DESCRIPTION - FREQUENCY
FREQUENCY: CONTINUOUS
FREQUENCY: CONTINUOUS

## 2023-06-03 ASSESSMENT — PAIN - FUNCTIONAL ASSESSMENT
PAIN_FUNCTIONAL_ASSESSMENT: 0-10
PAIN_FUNCTIONAL_ASSESSMENT: 0-10
PAIN_FUNCTIONAL_ASSESSMENT: ACTIVITIES ARE NOT PREVENTED
PAIN_FUNCTIONAL_ASSESSMENT: PREVENTS OR INTERFERES SOME ACTIVE ACTIVITIES AND ADLS

## 2023-06-03 ASSESSMENT — PAIN DESCRIPTION - ONSET
ONSET: ON-GOING
ONSET: ON-GOING

## 2023-06-03 ASSESSMENT — PAIN DESCRIPTION - LOCATION
LOCATION: BACK

## 2023-06-03 ASSESSMENT — PAIN SCALES - GENERAL
PAINLEVEL_OUTOF10: 5
PAINLEVEL_OUTOF10: 9
PAINLEVEL_OUTOF10: 9

## 2023-06-03 ASSESSMENT — PAIN DESCRIPTION - PAIN TYPE
TYPE: ACUTE PAIN
TYPE: ACUTE PAIN

## 2023-06-03 ASSESSMENT — PAIN DESCRIPTION - DESCRIPTORS
DESCRIPTORS: BURNING;SHOOTING
DESCRIPTORS: BURNING;SHOOTING
DESCRIPTORS: ACHING;BURNING;STABBING

## 2023-06-03 ASSESSMENT — PAIN DESCRIPTION - ORIENTATION
ORIENTATION: LOWER;MID
ORIENTATION: MID
ORIENTATION: MID;LOWER

## 2023-06-03 ASSESSMENT — PAIN DESCRIPTION - DIRECTION: RADIATING_TOWARDS: RT LEG

## 2023-06-03 NOTE — DISCHARGE INSTRUCTIONS
Take all medication as prescribed  Apply ice or heat to the area and 20-minute intervals  Follow-up with primary care doctor  If you notice any new worrisome symptoms please return to the emergency department for evaluation

## 2023-06-03 NOTE — ED PROVIDER NOTES
[address] ENCOUNTER      Pt Name: Aristides Negrete MRN: [id number]  Birthdate 2000  Date of evaluation: 6/3/2023  Provider: Leonides Schwab, DO  PCP: Michell Yi MD  Note Started: 5:42 AM EDT 6/3/23    CHIEF COMPLAINT       Chief Complaint   Patient presents with    Back Pain     2-3 days ago, was lifting and moving furniture. Since that evening c/o low back pain. Awoke this am with increased low back pain with radiation down rt leg. HISTORY OF PRESENT ILLNESS: 1 or more Elements   History From: Patient  Limitations to history : None    Aristides Negrete is a 24 y.o. male Past medical history of diabetes as well as hypertension. Patient presents with chief complaint of right lower back pain. Patient states that he was helping a friend move a dresser a few days ago and felt some pain in his right back. Patient states the pain initially was a dull aching sensation however it significantly worsened earlier tonight. Patient currently rates pain a 9 out of 10. He states the pain is located in his right lower back with radiation down his right leg. Patient states the pain is worsened with straight leg raise. He denies any relieving factors. Patient denies any similar episodes in the past.  He denies any previous falls or trauma. Patient denies any loss of bowel or bladder function he denies any IV drug abuse she denies any fevers. Nursing Notes were all reviewed and agreed with or any disagreements were addressed in the HPI. REVIEW OF SYSTEMS :    Positives and Pertinent negatives as per HPI.      SURGICAL HISTORY     Past Surgical History:   Procedure Laterality Date    ADENOIDECTOMY      BOWEL RESECTION  03/15/2023    BOWEL RESECTION SIGMOID LAPAROSCOPIC ROBOTIC    COLECTOMY N/A 3/15/2023    BOWEL RESECTION SIGMOID LAPAROSCOPIC ROBOTIC performed by Renae Bruno MD at [facility]

## 2024-05-08 ENCOUNTER — HOSPITAL ENCOUNTER (EMERGENCY)
Age: 24
Discharge: HOME | End: 2024-05-09
Attending: STUDENT IN AN ORGANIZED HEALTH CARE EDUCATION/TRAINING PROGRAM
Payer: COMMERCIAL

## 2024-05-08 DIAGNOSIS — J02.0 STREP PHARYNGITIS: Primary | ICD-10-CM

## 2024-05-08 LAB
FLUAV RNA RESP QL NAA+PROBE: NOT DETECTED
FLUBV RNA RESP QL NAA+PROBE: NOT DETECTED
SARS-COV-2 RNA RESP QL NAA+PROBE: NOT DETECTED
SOURCE: NORMAL
SPECIMEN DESCRIPTION: NORMAL
SPECIMEN SOURCE: ABNORMAL
STREP A, MOLECULAR: POSITIVE

## 2024-05-08 PROCEDURE — 96372 THER/PROPH/DIAG INJ SC/IM: CPT

## 2024-05-08 PROCEDURE — 99284 EMERGENCY DEPT VISIT MOD MDM: CPT

## 2024-05-08 PROCEDURE — 87636 SARSCOV2 & INF A&B AMP PRB: CPT

## 2024-05-08 PROCEDURE — 87651 STREP A DNA AMP PROBE: CPT

## 2024-05-08 ASSESSMENT — PAIN - FUNCTIONAL ASSESSMENT: PAIN_FUNCTIONAL_ASSESSMENT: 0-10

## 2024-05-08 ASSESSMENT — PAIN DESCRIPTION - LOCATION: LOCATION: THROAT

## 2024-05-08 ASSESSMENT — PAIN DESCRIPTION - PAIN TYPE: TYPE: ACUTE PAIN

## 2024-05-08 ASSESSMENT — PAIN DESCRIPTION - FREQUENCY: FREQUENCY: CONTINUOUS

## 2024-05-08 ASSESSMENT — PAIN SCALES - GENERAL: PAINLEVEL_OUTOF10: 9

## 2024-05-08 ASSESSMENT — PAIN DESCRIPTION - DESCRIPTORS: DESCRIPTORS: SORE;SHARP

## 2024-05-09 VITALS
SYSTOLIC BLOOD PRESSURE: 116 MMHG | DIASTOLIC BLOOD PRESSURE: 58 MMHG | TEMPERATURE: 98.7 F | BODY MASS INDEX: 49.44 KG/M2 | HEIGHT: 67 IN | WEIGHT: 315 LBS | HEART RATE: 96 BPM | OXYGEN SATURATION: 98 % | RESPIRATION RATE: 18 BRPM

## 2024-05-09 PROCEDURE — 6360000002 HC RX W HCPCS: Performed by: STUDENT IN AN ORGANIZED HEALTH CARE EDUCATION/TRAINING PROGRAM

## 2024-05-09 PROCEDURE — 6370000000 HC RX 637 (ALT 250 FOR IP): Performed by: STUDENT IN AN ORGANIZED HEALTH CARE EDUCATION/TRAINING PROGRAM

## 2024-05-09 RX ORDER — AMOXICILLIN AND CLAVULANATE POTASSIUM 875; 125 MG/1; MG/1
1 TABLET, FILM COATED ORAL 2 TIMES DAILY
Qty: 20 TABLET | Refills: 0 | Status: SHIPPED | OUTPATIENT
Start: 2024-05-09 | End: 2024-05-19

## 2024-05-09 RX ORDER — LIDOCAINE HYDROCHLORIDE 20 MG/ML
5 SOLUTION OROPHARYNGEAL
Qty: 100 ML | Refills: 0 | Status: SHIPPED | OUTPATIENT
Start: 2024-05-09

## 2024-05-09 RX ORDER — AMOXICILLIN AND CLAVULANATE POTASSIUM 875; 125 MG/1; MG/1
1 TABLET, FILM COATED ORAL ONCE
Status: COMPLETED | OUTPATIENT
Start: 2024-05-09 | End: 2024-05-09

## 2024-05-09 RX ORDER — LIDOCAINE HYDROCHLORIDE 20 MG/ML
10 SOLUTION OROPHARYNGEAL ONCE
Status: COMPLETED | OUTPATIENT
Start: 2024-05-09 | End: 2024-05-09

## 2024-05-09 RX ORDER — IBUPROFEN 600 MG/1
600 TABLET ORAL EVERY 6 HOURS PRN
Qty: 20 TABLET | Refills: 0 | Status: SHIPPED | OUTPATIENT
Start: 2024-05-09

## 2024-05-09 RX ORDER — KETOROLAC TROMETHAMINE 30 MG/ML
30 INJECTION, SOLUTION INTRAMUSCULAR; INTRAVENOUS ONCE
Status: COMPLETED | OUTPATIENT
Start: 2024-05-09 | End: 2024-05-09

## 2024-05-09 RX ADMIN — LIDOCAINE HYDROCHLORIDE 10 ML: 20 SOLUTION ORAL at 00:33

## 2024-05-09 RX ADMIN — KETOROLAC TROMETHAMINE 30 MG: 30 INJECTION, SOLUTION INTRAMUSCULAR at 00:33

## 2024-05-09 RX ADMIN — AMOXICILLIN AND CLAVULANATE POTASSIUM 1 TABLET: 875; 125 TABLET, FILM COATED ORAL at 00:33

## 2024-05-09 ASSESSMENT — ENCOUNTER SYMPTOMS
TROUBLE SWALLOWING: 0
STRIDOR: 0
VOICE CHANGE: 0
COUGH: 0
SHORTNESS OF BREATH: 0
SORE THROAT: 1

## 2024-05-09 NOTE — DISCHARGE INSTRUCTIONS
Please return to the ER for any new or worsening symptoms including but not limited to Fever or difficulty speaking or swallowing  If prescribed, please be sure to  your prescriptions from the pharmacy  Please follow-up with Primary care provider as instructed

## 2024-05-09 NOTE — ED PROVIDER NOTES
pharyngitis, viral pharyngitis, peritonsillar abscess, viral illness.       Independent interpretation of Laboratory tests by Ale Saldana DO: Rapid strep is positive.  Rapid COVID and flu are negative.    Independent interpretation of Radiology tests by Ale Saldana DO: None        Non-plain film images such as CT, Ultrasound and MRI are read by the radiologist. Plain radiographic images are visualized and preliminarily interpreted by the ED Provider with the above-noted findings. Interpretation per the Radiologist below, if available at the time of this note are included below.      EKG reviewed and interpreted by me, TIME:  This EKG is signed by me, Ale Saldana DO.     An EKG was not performed during this encounter.    All labs & imaging were reviewed and interpreted by me, see RESULTS. I have personally reviewed all laboratory and imaging results for this patient.       Are there any additional factors to consider that affect care (uninsured, homeless, illiterate, history from another source, etc.) (If yes, which ones).  No       This patient's ED course included: a personal history and physicial examination, re-evaluation prior to disposition, multiple bedside re-evaluations, and complex medical decision making and emergency management    This patient has remained hemodynamically stable during their ED course.    Counseling:   The emergency provider has spoken with the patient and discussed today’s results, in addition to providing specific details for the plan of care and counseling regarding the diagnosis and prognosis.  Questions are answered at this time and they are agreeable with the plan.    CONSULTS:   None       Please see ED course for any additional MDM documentation.       CRITICAL CARE TIME (.cct)     0 minutes            I am the Primary Clinician of Record.    FINAL IMPRESSION      1. Strep pharyngitis          DISPOSITION/PLAN     DISPOSITION Decision To Discharge

## 2024-05-27 ENCOUNTER — HOSPITAL ENCOUNTER (EMERGENCY)
Age: 24
Discharge: HOME | End: 2024-05-27
Payer: COMMERCIAL

## 2024-05-27 ENCOUNTER — APPOINTMENT (OUTPATIENT)
Dept: CT IMAGING | Age: 24
End: 2024-05-27
Payer: COMMERCIAL

## 2024-05-27 VITALS
WEIGHT: 315 LBS | SYSTOLIC BLOOD PRESSURE: 112 MMHG | TEMPERATURE: 98.4 F | OXYGEN SATURATION: 97 % | HEART RATE: 99 BPM | HEIGHT: 67 IN | RESPIRATION RATE: 18 BRPM | DIASTOLIC BLOOD PRESSURE: 68 MMHG | BODY MASS INDEX: 49.44 KG/M2

## 2024-05-27 DIAGNOSIS — K43.9 VENTRAL HERNIA WITHOUT OBSTRUCTION OR GANGRENE: ICD-10-CM

## 2024-05-27 DIAGNOSIS — N30.01 ACUTE CYSTITIS WITH HEMATURIA: Primary | ICD-10-CM

## 2024-05-27 LAB
ANION GAP SERPL CALCULATED.3IONS-SCNC: 14 MMOL/L (ref 7–16)
BACTERIA URNS QL MICRO: ABNORMAL
BASOPHILS # BLD: 0.06 K/UL (ref 0–0.2)
BASOPHILS NFR BLD: 0 % (ref 0–2)
BILIRUB UR QL STRIP: NEGATIVE
BUN SERPL-MCNC: 13 MG/DL (ref 6–20)
CALCIUM SERPL-MCNC: 8.8 MG/DL (ref 8.6–10.2)
CHLORIDE SERPL-SCNC: 101 MMOL/L (ref 98–107)
CLARITY UR: CLEAR
CO2 SERPL-SCNC: 22 MMOL/L (ref 22–29)
COLOR UR: YELLOW
CREAT SERPL-MCNC: 0.9 MG/DL (ref 0.7–1.2)
EOSINOPHIL # BLD: 0.03 K/UL (ref 0.05–0.5)
EOSINOPHILS RELATIVE PERCENT: 0 % (ref 0–6)
ERYTHROCYTE [DISTWIDTH] IN BLOOD BY AUTOMATED COUNT: 13.2 % (ref 11.5–15)
FLUAV RNA RESP QL NAA+PROBE: NOT DETECTED
FLUBV RNA RESP QL NAA+PROBE: NOT DETECTED
GFR, ESTIMATED: >90 ML/MIN/1.73M2
GLUCOSE SERPL-MCNC: 115 MG/DL (ref 74–99)
GLUCOSE UR STRIP-MCNC: NEGATIVE MG/DL
HCT VFR BLD AUTO: 41.5 % (ref 37–54)
HGB BLD-MCNC: 13.7 G/DL (ref 12.5–16.5)
HGB UR QL STRIP.AUTO: ABNORMAL
IMM GRANULOCYTES # BLD AUTO: 0.19 K/UL (ref 0–0.58)
IMM GRANULOCYTES NFR BLD: 1 % (ref 0–5)
KETONES UR STRIP-MCNC: NEGATIVE MG/DL
LACTATE BLDV-SCNC: 1.2 MMOL/L (ref 0.5–2.2)
LEUKOCYTE ESTERASE UR QL STRIP: ABNORMAL
LIPASE SERPL-CCNC: 24 U/L (ref 13–60)
LYMPHOCYTES NFR BLD: 2.44 K/UL (ref 1.5–4)
LYMPHOCYTES RELATIVE PERCENT: 12 % (ref 20–42)
MCH RBC QN AUTO: 29.1 PG (ref 26–35)
MCHC RBC AUTO-ENTMCNC: 33 G/DL (ref 32–34.5)
MCV RBC AUTO: 88.3 FL (ref 80–99.9)
MONOCYTES NFR BLD: 1.68 K/UL (ref 0.1–0.95)
MONOCYTES NFR BLD: 8 % (ref 2–12)
NEUTROPHILS NFR BLD: 79 % (ref 43–80)
NEUTS SEG NFR BLD: 16.63 K/UL (ref 1.8–7.3)
NITRITE UR QL STRIP: NEGATIVE
PH UR STRIP: 5.5 [PH] (ref 5–9)
PLATELET # BLD AUTO: 292 K/UL (ref 130–450)
PMV BLD AUTO: 9.6 FL (ref 7–12)
POTASSIUM SERPL-SCNC: 3.6 MMOL/L (ref 3.5–5)
PROT UR STRIP-MCNC: NEGATIVE MG/DL
RBC # BLD AUTO: 4.7 M/UL (ref 3.8–5.8)
RBC #/AREA URNS HPF: ABNORMAL /HPF
SARS-COV-2 RNA RESP QL NAA+PROBE: NOT DETECTED
SODIUM SERPL-SCNC: 137 MMOL/L (ref 132–146)
SOURCE: NORMAL
SP GR UR STRIP: 1.01 (ref 1–1.03)
SPECIMEN DESCRIPTION: NORMAL
SPECIMEN SOURCE: NORMAL
STREP A, MOLECULAR: NEGATIVE
UROBILINOGEN UR STRIP-ACNC: 4 EU/DL (ref 0–1)
WBC #/AREA URNS HPF: ABNORMAL /HPF
WBC OTHER # BLD: 21 K/UL (ref 4.5–11.5)

## 2024-05-27 PROCEDURE — 87651 STREP A DNA AMP PROBE: CPT

## 2024-05-27 PROCEDURE — 83605 ASSAY OF LACTIC ACID: CPT

## 2024-05-27 PROCEDURE — 87077 CULTURE AEROBIC IDENTIFY: CPT

## 2024-05-27 PROCEDURE — 6370000000 HC RX 637 (ALT 250 FOR IP): Performed by: NURSE PRACTITIONER

## 2024-05-27 PROCEDURE — 85025 COMPLETE CBC W/AUTO DIFF WBC: CPT

## 2024-05-27 PROCEDURE — 99284 EMERGENCY DEPT VISIT MOD MDM: CPT

## 2024-05-27 PROCEDURE — 2580000003 HC RX 258: Performed by: NURSE PRACTITIONER

## 2024-05-27 PROCEDURE — 87086 URINE CULTURE/COLONY COUNT: CPT

## 2024-05-27 PROCEDURE — 96375 TX/PRO/DX INJ NEW DRUG ADDON: CPT

## 2024-05-27 PROCEDURE — 83690 ASSAY OF LIPASE: CPT

## 2024-05-27 PROCEDURE — 74176 CT ABD & PELVIS W/O CONTRAST: CPT

## 2024-05-27 PROCEDURE — 6360000002 HC RX W HCPCS: Performed by: NURSE PRACTITIONER

## 2024-05-27 PROCEDURE — 87636 SARSCOV2 & INF A&B AMP PRB: CPT

## 2024-05-27 PROCEDURE — 81001 URINALYSIS AUTO W/SCOPE: CPT

## 2024-05-27 PROCEDURE — 80048 BASIC METABOLIC PNL TOTAL CA: CPT

## 2024-05-27 PROCEDURE — 96374 THER/PROPH/DIAG INJ IV PUSH: CPT

## 2024-05-27 RX ORDER — CEFDINIR 300 MG/1
300 CAPSULE ORAL 2 TIMES DAILY
Qty: 20 CAPSULE | Refills: 0 | Status: SHIPPED | OUTPATIENT
Start: 2024-05-27 | End: 2024-06-06

## 2024-05-27 RX ORDER — ONDANSETRON 2 MG/ML
4 INJECTION INTRAMUSCULAR; INTRAVENOUS EVERY 6 HOURS PRN
Status: DISCONTINUED | OUTPATIENT
Start: 2024-05-27 | End: 2024-05-28 | Stop reason: HOSPADM

## 2024-05-27 RX ORDER — 0.9 % SODIUM CHLORIDE 0.9 %
1000 INTRAVENOUS SOLUTION INTRAVENOUS ONCE
Status: COMPLETED | OUTPATIENT
Start: 2024-05-27 | End: 2024-05-27

## 2024-05-27 RX ORDER — ACETAMINOPHEN 500 MG
1000 TABLET ORAL ONCE
Status: COMPLETED | OUTPATIENT
Start: 2024-05-27 | End: 2024-05-27

## 2024-05-27 RX ORDER — IBUPROFEN 800 MG/1
800 TABLET ORAL EVERY 8 HOURS PRN
Qty: 21 TABLET | Refills: 0 | Status: SHIPPED | OUTPATIENT
Start: 2024-05-27

## 2024-05-27 RX ADMIN — CEFTRIAXONE SODIUM 1000 MG: 1 INJECTION, POWDER, FOR SOLUTION INTRAMUSCULAR; INTRAVENOUS at 22:09

## 2024-05-27 RX ADMIN — ONDANSETRON 4 MG: 2 INJECTION INTRAMUSCULAR; INTRAVENOUS at 21:07

## 2024-05-27 RX ADMIN — SODIUM CHLORIDE 1000 ML: 9 INJECTION, SOLUTION INTRAVENOUS at 21:08

## 2024-05-27 RX ADMIN — ACETAMINOPHEN 1000 MG: 500 TABLET ORAL at 20:17

## 2024-05-27 ASSESSMENT — PAIN - FUNCTIONAL ASSESSMENT: PAIN_FUNCTIONAL_ASSESSMENT: 0-10

## 2024-05-27 ASSESSMENT — PAIN SCALES - GENERAL
PAINLEVEL_OUTOF10: 10
PAINLEVEL_OUTOF10: 10

## 2024-05-27 ASSESSMENT — PAIN DESCRIPTION - LOCATION: LOCATION: HEAD

## 2024-05-27 ASSESSMENT — PAIN DESCRIPTION - ORIENTATION: ORIENTATION: MID

## 2024-05-27 ASSESSMENT — PAIN DESCRIPTION - DESCRIPTORS: DESCRIPTORS: ACHING;DISCOMFORT;DULL

## 2024-05-28 PROBLEM — K43.2 INCISIONAL HERNIA, WITHOUT OBSTRUCTION OR GANGRENE: Status: ACTIVE | Noted: 2024-05-28

## 2024-05-28 NOTE — ED PROVIDER NOTES
Medical Decision Making     Medications   ondansetron (ZOFRAN) injection 4 mg (4 mg IntraVENous Given 5/27/24 2107)   acetaminophen (TYLENOL) tablet 1,000 mg (1,000 mg Oral Given 5/27/24 2017)   sodium chloride 0.9 % bolus 1,000 mL (0 mLs IntraVENous Stopped 5/27/24 2208)   cefTRIAXone (ROCEPHIN) 1,000 mg in sterile water 10 mL IV syringe (1,000 mg IntraVENous Given 5/27/24 2209)        Re-examination:  5/27/24       Time: 2215 patient symptoms improved.    Consults:   None    Procedures:   none    Medical Decision Making:    Patient is well-appearing, afebrile.  Presents to ED today with dysuria urinary frequency and urgency.  No flank or abdominal pain on exam. labs obtained reviewed leukocytosis WBC count 21,000, BMP within normal notes, lactic acid negative, lipase negative, UA positive for leukocytes 10-20 WBCs RBCs and +1 bacteria, urine culture added and pending.  Patient was given IV fluids Tylenol and Zofran with relief of symptoms.  He was given 1 g of Rocephin while in ED.  CT scan of the abdomen pelvis negative for any acute intra-abdominal or pelvic findings indicative of supra umbilical fat-containing ventral hernia.  Plan will be for symptom control will start Omnicef patient vies to follow-up with PCP for recheck and was given a referral to establish with general surgery for evaluation of the ventral hernia.  Patient was educated on signs and symptoms which require emergent re-evaluation, and advised to return to ED for any new or worsening symptoms    Plan of Care/Counseling:  CATIE Castellano CNP reviewed today's visit with the patient in addition to providing specific details for the plan of care and counseling regarding the diagnosis and prognosis.  Questions are answered at this time and are agreeable with the plan.    Assessment      1. Acute cystitis with hematuria    2. Ventral hernia without obstruction or gangrene      Plan   Disposition:   Discharged home.  Patient condition is

## 2024-05-29 ENCOUNTER — TELEPHONE (OUTPATIENT)
Dept: SURGERY | Age: 24
End: 2024-05-29

## 2024-05-29 ENCOUNTER — OFFICE VISIT (OUTPATIENT)
Dept: SURGERY | Age: 24
End: 2024-05-29
Payer: COMMERCIAL

## 2024-05-29 VITALS
BODY MASS INDEX: 54.5 KG/M2 | TEMPERATURE: 97 F | SYSTOLIC BLOOD PRESSURE: 116 MMHG | HEIGHT: 67 IN | DIASTOLIC BLOOD PRESSURE: 68 MMHG

## 2024-05-29 DIAGNOSIS — Z09 HOSPITAL DISCHARGE FOLLOW-UP: ICD-10-CM

## 2024-05-29 DIAGNOSIS — K43.2 INCISIONAL HERNIA, WITHOUT OBSTRUCTION OR GANGRENE: Primary | ICD-10-CM

## 2024-05-29 PROCEDURE — G8417 CALC BMI ABV UP PARAM F/U: HCPCS | Performed by: SURGERY

## 2024-05-29 PROCEDURE — 4004F PT TOBACCO SCREEN RCVD TLK: CPT | Performed by: SURGERY

## 2024-05-29 PROCEDURE — 99213 OFFICE O/P EST LOW 20 MIN: CPT | Performed by: SURGERY

## 2024-05-29 PROCEDURE — 3078F DIAST BP <80 MM HG: CPT | Performed by: SURGERY

## 2024-05-29 PROCEDURE — G8427 DOCREV CUR MEDS BY ELIG CLIN: HCPCS | Performed by: SURGERY

## 2024-05-29 PROCEDURE — 3074F SYST BP LT 130 MM HG: CPT | Performed by: SURGERY

## 2024-05-29 PROCEDURE — 1111F DSCHRG MED/CURRENT MED MERGE: CPT | Performed by: SURGERY

## 2024-05-29 RX ORDER — MULTIVITAMIN WITH FOLIC ACID 400 MCG
1 TABLET ORAL DAILY
COMMUNITY
Start: 2024-05-07

## 2024-05-29 RX ORDER — TOPIRAMATE 50 MG
1 TABLET ORAL DAILY
COMMUNITY
Start: 2023-06-14

## 2024-05-29 RX ORDER — FAMOTIDINE 40 MG/1
TABLET, FILM COATED ORAL
COMMUNITY
Start: 2023-06-11

## 2024-05-29 NOTE — TELEPHONE ENCOUNTER
Per the order of Dr. Galloway, patient has been scheduled for Laparoscopic robotic incisional hernia repair  on 6.10.2024.  Patient provided with procedure information during office visit and scheduled for post op follow up appointment.  Patient instructed to please contact our office with any questions.    Procedure scheduled through [facility].  Dr. Galloway to enter orders.        Prior Authorization Form:      DEMOGRAPHICS:                     Patient Name:  Vinod Almeida Jr.  Patient :  2000            Insurance:  Payor: [employer] / Plan: [employer] / Product Type: *No Product type* /   Insurance ID Number:    Payer/Plan Subscr  Sex Relation Sub. Ins. ID Effective Group Num   1. [employer] - YUMIKONA VINOD ALMEIDA SR 3/31/1969 Male Child [id number]                                   [address]   2. [employer] - * VINOD ALMEIDA J* 2000 Male Self [id number]                                   [address]         DIAGNOSIS & PROCEDURE:                       Procedure/Operation: Laparoscopic robotic incisional hernia repair            CPT Code: 58786    Diagnosis:  Incisional Hernia    ICD10 Code: K43.2    Location:  [facility]    Surgeon:  Nilesh    SCHEDULING INFORMATION:                          Date: 6.10.2024    Time: TBD              Anesthesia:  General                                                       Status:  Outpatient        Special Comments:         Electronically signed by Jazmin Keller on 2024 at 2:48 PM

## 2024-05-29 NOTE — PROGRESS NOTES
retractions, no wheezing  Heart: Reg rate  Abdomen: soft, incarcerated supraumbilical incisional hernia, morbidly obese.  Minimal tenderness.  Skin; warm and dry, no cyanosis  Gu: no cva tenderness  Extremities: atraumatic, no focal motor deficits, no open wounds  Psych: No tremor, visual hallucinations        Radiology: I reviewed relevant abdominal imaging from this admission and that available in the EMR including CT abd/pel from 5/27/2024. My assessment is supraumbilical incisional hernia    Assessment:  Vinod Rodriguez Jr. is a 24 y.o. male with Incarcerated incisional hernia  Patient Active Problem List   Diagnosis    Other sprain of right middle finger, initial encounter    Tendinitis    Pain of finger of right hand    Perforated abdominal viscus    Diverticulitis of sigmoid colon    Morbid obesity (HCC)    Perforation of sigmoid colon due to diverticulitis    Primary hypertension    Diverticulitis    Incisional hernia, without obstruction or gangrene    Incisional hernia         Plan:  There is no contributing family history to this above complaint in the patient's mother or father  We discussed at length the patient's previous abdominal imaging, reviewed the anatomical findings of the physical exam and imaging  I discussed with him what the diagnosis of the hernia entails.  I explained abdominal wall anatomy.  We discussed mechanism of action of pain medication including Tylenol and ibuprofen for pain control  We discussed the operative and nonoperative management of hernias.  We discussed the incarceration rate strangulation rate and risks of emergent surgery.  I reviewed at length emergent care and medical professional notes enclosed in the electronic medical record, independently reviewed their assessment and uses to formulate the opinion to recommend surgical intervention.    I recommend OR for Laparoscopic robot assisted incisional hernia repair with mesh    Discussed the risk, benefits and alternatives

## 2024-05-30 ENCOUNTER — APPOINTMENT (OUTPATIENT)
Dept: CT IMAGING | Age: 24
End: 2024-05-30
Payer: COMMERCIAL

## 2024-05-30 ENCOUNTER — HOSPITAL ENCOUNTER (EMERGENCY)
Age: 24
Discharge: HOME | End: 2024-05-30
Attending: STUDENT IN AN ORGANIZED HEALTH CARE EDUCATION/TRAINING PROGRAM
Payer: COMMERCIAL

## 2024-05-30 VITALS
SYSTOLIC BLOOD PRESSURE: 122 MMHG | DIASTOLIC BLOOD PRESSURE: 70 MMHG | BODY MASS INDEX: 49.44 KG/M2 | OXYGEN SATURATION: 98 % | TEMPERATURE: 98.3 F | RESPIRATION RATE: 22 BRPM | HEIGHT: 67 IN | WEIGHT: 315 LBS | HEART RATE: 84 BPM

## 2024-05-30 DIAGNOSIS — R11.2 NAUSEA AND VOMITING, UNSPECIFIED VOMITING TYPE: Primary | ICD-10-CM

## 2024-05-30 DIAGNOSIS — K42.9 PERIUMBILICAL HERNIA: ICD-10-CM

## 2024-05-30 LAB
ALBUMIN SERPL-MCNC: 3.8 G/DL (ref 3.5–5.2)
ALP SERPL-CCNC: 123 U/L (ref 40–129)
ALT SERPL-CCNC: 46 U/L (ref 0–40)
ANION GAP SERPL CALCULATED.3IONS-SCNC: 13 MMOL/L (ref 7–16)
AST SERPL-CCNC: 40 U/L (ref 0–39)
BASOPHILS # BLD: 0.04 K/UL (ref 0–0.2)
BASOPHILS NFR BLD: 1 % (ref 0–2)
BILIRUB SERPL-MCNC: 0.2 MG/DL (ref 0–1.2)
BILIRUB UR QL STRIP: NEGATIVE
BUN SERPL-MCNC: 13 MG/DL (ref 6–20)
CALCIUM SERPL-MCNC: 8.8 MG/DL (ref 8.6–10.2)
CHLORIDE SERPL-SCNC: 103 MMOL/L (ref 98–107)
CLARITY UR: CLEAR
CO2 SERPL-SCNC: 21 MMOL/L (ref 22–29)
COLOR UR: YELLOW
CREAT SERPL-MCNC: 0.7 MG/DL (ref 0.7–1.2)
EKG ATRIAL RATE: 85 BPM
EKG P AXIS: 25 DEGREES
EKG P-R INTERVAL: 140 MS
EKG Q-T INTERVAL: 360 MS
EKG QRS DURATION: 80 MS
EKG QTC CALCULATION (BAZETT): 428 MS
EKG R AXIS: 60 DEGREES
EKG T AXIS: 12 DEGREES
EKG VENTRICULAR RATE: 85 BPM
EOSINOPHIL # BLD: 0.15 K/UL (ref 0.05–0.5)
EOSINOPHILS RELATIVE PERCENT: 2 % (ref 0–6)
ERYTHROCYTE [DISTWIDTH] IN BLOOD BY AUTOMATED COUNT: 13.2 % (ref 11.5–15)
GFR, ESTIMATED: >90 ML/MIN/1.73M2
GLUCOSE SERPL-MCNC: 109 MG/DL (ref 74–99)
GLUCOSE UR STRIP-MCNC: NEGATIVE MG/DL
HCT VFR BLD AUTO: 42.6 % (ref 37–54)
HGB BLD-MCNC: 14 G/DL (ref 12.5–16.5)
HGB UR QL STRIP.AUTO: ABNORMAL
IMM GRANULOCYTES # BLD AUTO: 0.28 K/UL (ref 0–0.58)
IMM GRANULOCYTES NFR BLD: 3 % (ref 0–5)
KETONES UR STRIP-MCNC: NEGATIVE MG/DL
LACTATE BLDV-SCNC: 1.9 MMOL/L (ref 0.5–2.2)
LEUKOCYTE ESTERASE UR QL STRIP: NEGATIVE
LIPASE SERPL-CCNC: 24 U/L (ref 13–60)
LYMPHOCYTES NFR BLD: 1.06 K/UL (ref 1.5–4)
LYMPHOCYTES RELATIVE PERCENT: 13 % (ref 20–42)
MCH RBC QN AUTO: 29 PG (ref 26–35)
MCHC RBC AUTO-ENTMCNC: 32.9 G/DL (ref 32–34.5)
MCV RBC AUTO: 88.4 FL (ref 80–99.9)
MICROORGANISM SPEC CULT: ABNORMAL
MONOCYTES NFR BLD: 0.79 K/UL (ref 0.1–0.95)
MONOCYTES NFR BLD: 10 % (ref 2–12)
NEUTROPHILS NFR BLD: 72 % (ref 43–80)
NEUTS SEG NFR BLD: 5.9 K/UL (ref 1.8–7.3)
NITRITE UR QL STRIP: NEGATIVE
PH UR STRIP: 6.5 [PH] (ref 5–9)
PLATELET # BLD AUTO: 324 K/UL (ref 130–450)
PMV BLD AUTO: 9.5 FL (ref 7–12)
POTASSIUM SERPL-SCNC: 4.3 MMOL/L (ref 3.5–5)
PROT SERPL-MCNC: 7.5 G/DL (ref 6.4–8.3)
PROT UR STRIP-MCNC: NEGATIVE MG/DL
RBC # BLD AUTO: 4.82 M/UL (ref 3.8–5.8)
RBC #/AREA URNS HPF: ABNORMAL /HPF
SODIUM SERPL-SCNC: 137 MMOL/L (ref 132–146)
SP GR UR STRIP: 1.02 (ref 1–1.03)
SPECIMEN DESCRIPTION: ABNORMAL
TROPONIN I SERPL HS-MCNC: <6 NG/L (ref 0–11)
UROBILINOGEN UR STRIP-ACNC: 0.2 EU/DL (ref 0–1)
WBC #/AREA URNS HPF: ABNORMAL /HPF
WBC OTHER # BLD: 8.2 K/UL (ref 4.5–11.5)

## 2024-05-30 PROCEDURE — 6360000004 HC RX CONTRAST MEDICATION: Performed by: RADIOLOGY

## 2024-05-30 PROCEDURE — 6360000002 HC RX W HCPCS: Performed by: STUDENT IN AN ORGANIZED HEALTH CARE EDUCATION/TRAINING PROGRAM

## 2024-05-30 PROCEDURE — 83690 ASSAY OF LIPASE: CPT

## 2024-05-30 PROCEDURE — 96374 THER/PROPH/DIAG INJ IV PUSH: CPT

## 2024-05-30 PROCEDURE — 74177 CT ABD & PELVIS W/CONTRAST: CPT

## 2024-05-30 PROCEDURE — 99285 EMERGENCY DEPT VISIT HI MDM: CPT

## 2024-05-30 PROCEDURE — 80053 COMPREHEN METABOLIC PANEL: CPT

## 2024-05-30 PROCEDURE — 81001 URINALYSIS AUTO W/SCOPE: CPT

## 2024-05-30 PROCEDURE — 83605 ASSAY OF LACTIC ACID: CPT

## 2024-05-30 PROCEDURE — 84484 ASSAY OF TROPONIN QUANT: CPT

## 2024-05-30 PROCEDURE — 93010 ELECTROCARDIOGRAM REPORT: CPT | Performed by: INTERNAL MEDICINE

## 2024-05-30 PROCEDURE — 93005 ELECTROCARDIOGRAM TRACING: CPT | Performed by: STUDENT IN AN ORGANIZED HEALTH CARE EDUCATION/TRAINING PROGRAM

## 2024-05-30 PROCEDURE — 85025 COMPLETE CBC W/AUTO DIFF WBC: CPT

## 2024-05-30 RX ORDER — ONDANSETRON 4 MG/1
4 TABLET, ORALLY DISINTEGRATING ORAL 3 TIMES DAILY PRN
Qty: 21 TABLET | Refills: 0 | Status: SHIPPED | OUTPATIENT
Start: 2024-05-30

## 2024-05-30 RX ORDER — ONDANSETRON 2 MG/ML
4 INJECTION INTRAMUSCULAR; INTRAVENOUS ONCE
Status: COMPLETED | OUTPATIENT
Start: 2024-05-30 | End: 2024-05-30

## 2024-05-30 RX ADMIN — IOPAMIDOL 75 ML: 755 INJECTION, SOLUTION INTRAVENOUS at 11:23

## 2024-05-30 RX ADMIN — ONDANSETRON 4 MG: 2 INJECTION INTRAMUSCULAR; INTRAVENOUS at 12:40

## 2024-05-30 ASSESSMENT — ENCOUNTER SYMPTOMS
EYE REDNESS: 0
ABDOMINAL PAIN: 1
VOMITING: 1
SINUS PRESSURE: 0
EYE DISCHARGE: 0
SHORTNESS OF BREATH: 0
BACK PAIN: 0
DIARRHEA: 1
WHEEZING: 0
EYE PAIN: 0
COUGH: 0
NAUSEA: 1
SORE THROAT: 0

## 2024-05-30 ASSESSMENT — LIFESTYLE VARIABLES: HOW OFTEN DO YOU HAVE A DRINK CONTAINING ALCOHOL: MONTHLY OR LESS

## 2024-05-30 NOTE — DISCHARGE INSTRUCTIONS
CT ABDOMEN PELVIS W IV CONTRAST Additional Contrast? None   Final Result   No new findings or significant changes.

## 2024-05-30 NOTE — ED PROVIDER NOTES
Department of Emergency Medicine   ED  Provider Note  Admit Date/RoomTime: 5/30/2024  9:34 AM  ED Room: 11/11              hospitals     Vinod Rodriguez Jr. is a 24 y.o. male with a PMHx significant for  MILADY on CPAP, diverticulitis, HTN  who presents for evaluation of abdominal pain, beginning prior to arrival.  The complaint has been persistent, moderate in severity, and worsened by eating and drinking.   The patient states that he started to have severe pain in his abdomen this morning, which he states is burning in nature. States the pain is near the hernia he was told he has.  Notes that he was seen a few days ago for UTI, had a CT scan showing a hernia, has seen a general surgeon for this and has surgery scheduled.   Notes he has been having worsening nausea, vomiting, and decreased appetite.   Notes three to four episodes of diarrhea and emesis today, non-bloody.       Review of Systems   Constitutional:  Negative for chills and fever.   HENT:  Negative for ear pain, sinus pressure and sore throat.    Eyes:  Negative for pain, discharge and redness.   Respiratory:  Negative for cough, shortness of breath and wheezing.    Cardiovascular:  Negative for chest pain.   Gastrointestinal:  Positive for abdominal pain, diarrhea, nausea and vomiting.   Genitourinary:  Negative for dysuria and frequency.   Musculoskeletal:  Negative for arthralgias and back pain.   Skin:  Negative for rash and wound.   Neurological:  Negative for weakness and headaches.   Hematological:  Negative for adenopathy.   All other systems reviewed and are negative.       Physical Exam  Vitals and nursing note reviewed.   Constitutional:       General: He is not in acute distress.     Appearance: Normal appearance. He is well-developed. He is not ill-appearing.   HENT:      Head: Normocephalic and atraumatic.      Right Ear: External ear normal.      Left Ear: External ear normal.   Eyes:      General:         Right eye: No discharge.         Left  eye: No discharge.      Extraocular Movements: Extraocular movements intact.      Conjunctiva/sclera: Conjunctivae normal.   Cardiovascular:      Rate and Rhythm: Normal rate and regular rhythm.      Heart sounds: Normal heart sounds. No murmur heard.  Pulmonary:      Effort: Pulmonary effort is normal. No respiratory distress.      Breath sounds: Normal breath sounds. No stridor.   Abdominal:      General: There is no distension.      Palpations: Abdomen is soft. There is no mass.      Tenderness: There is generalized abdominal tenderness and tenderness in the right upper quadrant, epigastric area and periumbilical area.      Hernia: No hernia is present.   Musculoskeletal:      Cervical back: Normal range of motion and neck supple.   Skin:     General: Skin is warm and dry.      Coloration: Skin is not jaundiced or pale.   Neurological:      General: No focal deficit present.      Mental Status: He is alert.          Procedures    MDM             Differential diagnosis: ***  Review of ED/ Outpatient Records: ***  Historians that case was discussed with: ***  My EKG interpretation: See ED course  Imaging Non-plain film images such as CT, Ultrasound and MRI are read by the radiologist. Plain radiographic images are visualized and preliminarily interpreted by the ED provider:***  Discussed with other providers: ***  Tests Considered but not ordered: ***  Decision making tools/risks stratification / MDM / Independent Interpretation of labs: Vinod BON Rodriguez Jr. presents to the ED for ***.  History from ***, with *** limitations. Workup in the ED revealed  ***.   Social Determinants of health impacting treatment or disposition: ***  CODE status and Discussions: ***        Patient continues to be non-toxic on re-evaluation. Findings were discussed with the patient and reasons to immediately return to the ED were articulated to them. They will follow-up with their PCP,  *** and {Specialties; internal medicine

## 2024-06-04 RX ORDER — SODIUM CHLORIDE 0.9 % (FLUSH) 0.9 %
5-40 SYRINGE (ML) INJECTION EVERY 12 HOURS SCHEDULED
OUTPATIENT
Start: 2024-06-04

## 2024-06-04 RX ORDER — SODIUM CHLORIDE 9 MG/ML
INJECTION, SOLUTION INTRAVENOUS CONTINUOUS
OUTPATIENT
Start: 2024-06-04

## 2024-06-04 RX ORDER — SODIUM CHLORIDE 9 MG/ML
INJECTION, SOLUTION INTRAVENOUS PRN
OUTPATIENT
Start: 2024-06-04

## 2024-06-04 RX ORDER — SODIUM CHLORIDE 0.9 % (FLUSH) 0.9 %
5-40 SYRINGE (ML) INJECTION PRN
OUTPATIENT
Start: 2024-06-04

## 2024-06-05 ENCOUNTER — HOSPITAL ENCOUNTER (OUTPATIENT)
Dept: PREADMISSION TESTING | Age: 24
Discharge: HOME | End: 2024-06-05

## 2024-06-05 VITALS
WEIGHT: 315 LBS | SYSTOLIC BLOOD PRESSURE: 124 MMHG | BODY MASS INDEX: 49.44 KG/M2 | RESPIRATION RATE: 18 BRPM | HEIGHT: 67 IN | DIASTOLIC BLOOD PRESSURE: 61 MMHG | OXYGEN SATURATION: 99 % | HEART RATE: 70 BPM | TEMPERATURE: 97.7 F

## 2024-06-05 ASSESSMENT — PAIN DESCRIPTION - DESCRIPTORS: DESCRIPTORS: BURNING

## 2024-06-05 ASSESSMENT — PAIN DESCRIPTION - PAIN TYPE: TYPE: CHRONIC PAIN

## 2024-06-05 ASSESSMENT — PAIN DESCRIPTION - ORIENTATION: ORIENTATION: UPPER

## 2024-06-05 ASSESSMENT — PAIN SCALES - GENERAL: PAINLEVEL_OUTOF10: 3

## 2024-06-05 ASSESSMENT — PAIN DESCRIPTION - LOCATION: LOCATION: ABDOMEN

## 2024-06-05 NOTE — PROGRESS NOTES
[facility]                                                                                                                    PRE OP INSTRUCTIONS FOR  Vinod Rodriguez .        Date: 6/5/2024    Date of surgery: 6/10/24   Arrival Time: Hospital will call you between 5pm and 7pm with your final arrival time for surgery. Go to     front of hospital and check in at information desk.    Nothing by mouth (NPO) as instructed. May have clear liquids up to 2 hours prior to surgery. Nothing solid after midnight. Examples: water, apple juice, black coffee, plain tea    Take the following medications with a small sip of water on the morning of Surgery: topamax, pepcid       Aspirin, Ibuprofen, Advil, Naproxen, other Anti-inflammatory products should be stopped before surgery as directed by your surgeon, cardiologist, or primary care Doctor. Herbal supplements and Vitamin E should be stopped five days prior.  May take Tylenol unless instructed otherwise by your surgeon.      Do not smoke, vape, or use illicit drugs and do not drink any alcoholic beverages 24 hours prior to surgery.    You may brush your teeth the morning of surgery.      You MUST make arrangements for a responsible adult, 18 and over, to take you home after your surgery. You will not be allowed to leave alone or drive yourself home.  You will need someone stay with you the first 24 hrs. Your surgery will be cancelled if you do not have a ride home or someone to stay with you.      Please wear simple, loose fitting clothing to the hospital.  Do not bring valuables (money, credit cards, checkbooks, etc.) Do not wear any makeup (including no eye makeup) or nail polish on your fingers or toes.    DO NOT wear any jewelry or piercings on day of surgery.  All body piercings and jewelry must be removed.    Shower the night before surgery with an __x_ Antibacterial soap /antibacterial CHG wipes ____x___      Notify your Surgeon if you develop

## 2024-06-10 ENCOUNTER — ANESTHESIA EVENT (OUTPATIENT)
Dept: OPERATING ROOM | Age: 24
End: 2024-06-10
Payer: COMMERCIAL

## 2024-06-10 ENCOUNTER — HOSPITAL ENCOUNTER (OUTPATIENT)
Age: 24
Setting detail: OUTPATIENT SURGERY
Discharge: HOME | End: 2024-06-10
Attending: SURGERY | Admitting: SURGERY
Payer: COMMERCIAL

## 2024-06-10 ENCOUNTER — ANESTHESIA (OUTPATIENT)
Dept: OPERATING ROOM | Age: 24
End: 2024-06-10
Payer: COMMERCIAL

## 2024-06-10 VITALS
WEIGHT: 315 LBS | TEMPERATURE: 97.3 F | SYSTOLIC BLOOD PRESSURE: 122 MMHG | BODY MASS INDEX: 49.44 KG/M2 | DIASTOLIC BLOOD PRESSURE: 72 MMHG | RESPIRATION RATE: 20 BRPM | HEART RATE: 85 BPM | HEIGHT: 67 IN | OXYGEN SATURATION: 98 %

## 2024-06-10 DIAGNOSIS — K43.2 INCISIONAL HERNIA, WITHOUT OBSTRUCTION OR GANGRENE: Primary | ICD-10-CM

## 2024-06-10 PROCEDURE — 3600000009 HC SURGERY ROBOT BASE: Performed by: SURGERY

## 2024-06-10 PROCEDURE — 3700000001 HC ADD 15 MINUTES (ANESTHESIA): Performed by: SURGERY

## 2024-06-10 PROCEDURE — 2580000003 HC RX 258: Performed by: ANESTHESIOLOGY

## 2024-06-10 PROCEDURE — 3700000000 HC ANESTHESIA ATTENDED CARE: Performed by: SURGERY

## 2024-06-10 PROCEDURE — 7100000011 HC PHASE II RECOVERY - ADDTL 15 MIN: Performed by: SURGERY

## 2024-06-10 PROCEDURE — 6360000002 HC RX W HCPCS

## 2024-06-10 PROCEDURE — 2500000003 HC RX 250 WO HCPCS: Performed by: SURGERY

## 2024-06-10 PROCEDURE — 2709999900 HC NON-CHARGEABLE SUPPLY: Performed by: SURGERY

## 2024-06-10 PROCEDURE — 3600000019 HC SURGERY ROBOT ADDTL 15MIN: Performed by: SURGERY

## 2024-06-10 PROCEDURE — 7100000010 HC PHASE II RECOVERY - FIRST 15 MIN: Performed by: SURGERY

## 2024-06-10 PROCEDURE — 6360000002 HC RX W HCPCS: Performed by: ANESTHESIOLOGY

## 2024-06-10 PROCEDURE — C1781 MESH (IMPLANTABLE): HCPCS | Performed by: SURGERY

## 2024-06-10 PROCEDURE — 49593 RPR AA HRN 1ST 3-10 RDC: CPT | Performed by: SURGERY

## 2024-06-10 PROCEDURE — 6360000002 HC RX W HCPCS: Performed by: SURGERY

## 2024-06-10 PROCEDURE — 2500000003 HC RX 250 WO HCPCS: Performed by: NURSE ANESTHETIST, CERTIFIED REGISTERED

## 2024-06-10 PROCEDURE — S2900 ROBOTIC SURGICAL SYSTEM: HCPCS | Performed by: SURGERY

## 2024-06-10 PROCEDURE — 6360000002 HC RX W HCPCS: Performed by: NURSE ANESTHETIST, CERTIFIED REGISTERED

## 2024-06-10 PROCEDURE — 7100000001 HC PACU RECOVERY - ADDTL 15 MIN: Performed by: SURGERY

## 2024-06-10 PROCEDURE — 2580000003 HC RX 258: Performed by: SURGERY

## 2024-06-10 PROCEDURE — 7100000000 HC PACU RECOVERY - FIRST 15 MIN: Performed by: SURGERY

## 2024-06-10 DEVICE — VENTRALIGHT ST MESH, 4" X 6" (10.2 CM X 15.2 CM), ELLIPSE
Type: IMPLANTABLE DEVICE | Site: ABDOMEN | Status: FUNCTIONAL
Brand: VENTRALIGHT

## 2024-06-10 RX ORDER — PROCHLORPERAZINE EDISYLATE 5 MG/ML
5 INJECTION INTRAMUSCULAR; INTRAVENOUS
Status: DISCONTINUED | OUTPATIENT
Start: 2024-06-10 | End: 2024-06-10 | Stop reason: HOSPADM

## 2024-06-10 RX ORDER — KETOROLAC TROMETHAMINE 30 MG/ML
INJECTION, SOLUTION INTRAMUSCULAR; INTRAVENOUS PRN
Status: DISCONTINUED | OUTPATIENT
Start: 2024-06-10 | End: 2024-06-10 | Stop reason: SDUPTHER

## 2024-06-10 RX ORDER — ONDANSETRON 2 MG/ML
4 INJECTION INTRAMUSCULAR; INTRAVENOUS
Status: DISCONTINUED | OUTPATIENT
Start: 2024-06-10 | End: 2024-06-10 | Stop reason: HOSPADM

## 2024-06-10 RX ORDER — SODIUM CHLORIDE 0.9 % (FLUSH) 0.9 %
5-40 SYRINGE (ML) INJECTION PRN
Status: DISCONTINUED | OUTPATIENT
Start: 2024-06-10 | End: 2024-06-10 | Stop reason: HOSPADM

## 2024-06-10 RX ORDER — LABETALOL HYDROCHLORIDE 5 MG/ML
10 INJECTION, SOLUTION INTRAVENOUS
Status: DISCONTINUED | OUTPATIENT
Start: 2024-06-10 | End: 2024-06-10 | Stop reason: HOSPADM

## 2024-06-10 RX ORDER — MEPERIDINE HYDROCHLORIDE 25 MG/ML
12.5 INJECTION INTRAMUSCULAR; INTRAVENOUS; SUBCUTANEOUS EVERY 5 MIN PRN
Status: DISCONTINUED | OUTPATIENT
Start: 2024-06-10 | End: 2024-06-10 | Stop reason: HOSPADM

## 2024-06-10 RX ORDER — LIDOCAINE HYDROCHLORIDE 20 MG/ML
INJECTION, SOLUTION INTRAVENOUS PRN
Status: DISCONTINUED | OUTPATIENT
Start: 2024-06-10 | End: 2024-06-10 | Stop reason: SDUPTHER

## 2024-06-10 RX ORDER — SODIUM CHLORIDE 9 MG/ML
INJECTION, SOLUTION INTRAVENOUS PRN
Status: DISCONTINUED | OUTPATIENT
Start: 2024-06-10 | End: 2024-06-10 | Stop reason: HOSPADM

## 2024-06-10 RX ORDER — KETAMINE HCL IN NACL, ISO-OSM 100MG/10ML
SYRINGE (ML) INJECTION PRN
Status: DISCONTINUED | OUTPATIENT
Start: 2024-06-10 | End: 2024-06-10 | Stop reason: SDUPTHER

## 2024-06-10 RX ORDER — DEXAMETHASONE SODIUM PHOSPHATE 10 MG/ML
INJECTION, SOLUTION INTRAMUSCULAR; INTRAVENOUS PRN
Status: DISCONTINUED | OUTPATIENT
Start: 2024-06-10 | End: 2024-06-10 | Stop reason: SDUPTHER

## 2024-06-10 RX ORDER — MIDAZOLAM HYDROCHLORIDE 1 MG/ML
INJECTION INTRAMUSCULAR; INTRAVENOUS PRN
Status: DISCONTINUED | OUTPATIENT
Start: 2024-06-10 | End: 2024-06-10 | Stop reason: SDUPTHER

## 2024-06-10 RX ORDER — SODIUM CHLORIDE 0.9 % (FLUSH) 0.9 %
5-40 SYRINGE (ML) INJECTION EVERY 12 HOURS SCHEDULED
Status: DISCONTINUED | OUTPATIENT
Start: 2024-06-10 | End: 2024-06-10 | Stop reason: HOSPADM

## 2024-06-10 RX ORDER — BUPIVACAINE HYDROCHLORIDE AND EPINEPHRINE 2.5; 5 MG/ML; UG/ML
INJECTION, SOLUTION EPIDURAL; INFILTRATION; INTRACAUDAL; PERINEURAL PRN
Status: DISCONTINUED | OUTPATIENT
Start: 2024-06-10 | End: 2024-06-10 | Stop reason: ALTCHOICE

## 2024-06-10 RX ORDER — METHOCARBAMOL 100 MG/ML
1000 INJECTION, SOLUTION INTRAMUSCULAR; INTRAVENOUS ONCE
Status: COMPLETED | OUTPATIENT
Start: 2024-06-10 | End: 2024-06-10

## 2024-06-10 RX ORDER — METHOCARBAMOL 750 MG/1
750 TABLET, FILM COATED ORAL 4 TIMES DAILY
Qty: 40 TABLET | Refills: 0 | Status: SHIPPED | OUTPATIENT
Start: 2024-06-10 | End: 2024-06-20

## 2024-06-10 RX ORDER — ONDANSETRON 2 MG/ML
INJECTION INTRAMUSCULAR; INTRAVENOUS PRN
Status: DISCONTINUED | OUTPATIENT
Start: 2024-06-10 | End: 2024-06-10 | Stop reason: SDUPTHER

## 2024-06-10 RX ORDER — MIDAZOLAM HYDROCHLORIDE 1 MG/ML
2 INJECTION INTRAMUSCULAR; INTRAVENOUS
Status: DISCONTINUED | OUTPATIENT
Start: 2024-06-10 | End: 2024-06-10 | Stop reason: HOSPADM

## 2024-06-10 RX ORDER — SODIUM CHLORIDE 9 MG/ML
INJECTION, SOLUTION INTRAVENOUS CONTINUOUS
Status: DISCONTINUED | OUTPATIENT
Start: 2024-06-10 | End: 2024-06-10 | Stop reason: HOSPADM

## 2024-06-10 RX ORDER — SUCCINYLCHOLINE/SOD CL,ISO/PF 200MG/10ML
SYRINGE (ML) INTRAVENOUS PRN
Status: DISCONTINUED | OUTPATIENT
Start: 2024-06-10 | End: 2024-06-10 | Stop reason: SDUPTHER

## 2024-06-10 RX ORDER — MORPHINE SULFATE 2 MG/ML
2 INJECTION, SOLUTION INTRAMUSCULAR; INTRAVENOUS EVERY 5 MIN PRN
Status: DISCONTINUED | OUTPATIENT
Start: 2024-06-10 | End: 2024-06-10 | Stop reason: HOSPADM

## 2024-06-10 RX ORDER — KETOROLAC TROMETHAMINE 30 MG/ML
30 INJECTION, SOLUTION INTRAMUSCULAR; INTRAVENOUS
Status: DISCONTINUED | OUTPATIENT
Start: 2024-06-10 | End: 2024-06-10 | Stop reason: HOSPADM

## 2024-06-10 RX ORDER — PROPOFOL 10 MG/ML
INJECTION, EMULSION INTRAVENOUS PRN
Status: DISCONTINUED | OUTPATIENT
Start: 2024-06-10 | End: 2024-06-10 | Stop reason: SDUPTHER

## 2024-06-10 RX ORDER — IPRATROPIUM BROMIDE AND ALBUTEROL SULFATE 2.5; .5 MG/3ML; MG/3ML
1 SOLUTION RESPIRATORY (INHALATION)
Status: DISCONTINUED | OUTPATIENT
Start: 2024-06-10 | End: 2024-06-10 | Stop reason: HOSPADM

## 2024-06-10 RX ORDER — HYDRALAZINE HYDROCHLORIDE 20 MG/ML
10 INJECTION INTRAMUSCULAR; INTRAVENOUS
Status: DISCONTINUED | OUTPATIENT
Start: 2024-06-10 | End: 2024-06-10 | Stop reason: HOSPADM

## 2024-06-10 RX ORDER — FENTANYL CITRATE 50 UG/ML
INJECTION, SOLUTION INTRAMUSCULAR; INTRAVENOUS PRN
Status: DISCONTINUED | OUTPATIENT
Start: 2024-06-10 | End: 2024-06-10 | Stop reason: SDUPTHER

## 2024-06-10 RX ORDER — METHOCARBAMOL 100 MG/ML
INJECTION, SOLUTION INTRAMUSCULAR; INTRAVENOUS
Status: COMPLETED
Start: 2024-06-10 | End: 2024-06-10

## 2024-06-10 RX ORDER — ROCURONIUM BROMIDE 10 MG/ML
INJECTION, SOLUTION INTRAVENOUS PRN
Status: DISCONTINUED | OUTPATIENT
Start: 2024-06-10 | End: 2024-06-10 | Stop reason: SDUPTHER

## 2024-06-10 RX ORDER — OXYCODONE HYDROCHLORIDE AND ACETAMINOPHEN 5; 325 MG/1; MG/1
1 TABLET ORAL EVERY 6 HOURS PRN
Qty: 12 TABLET | Refills: 0 | Status: SHIPPED | OUTPATIENT
Start: 2024-06-10 | End: 2024-06-13

## 2024-06-10 RX ORDER — NALOXONE HYDROCHLORIDE 0.4 MG/ML
INJECTION, SOLUTION INTRAMUSCULAR; INTRAVENOUS; SUBCUTANEOUS PRN
Status: DISCONTINUED | OUTPATIENT
Start: 2024-06-10 | End: 2024-06-10 | Stop reason: HOSPADM

## 2024-06-10 RX ORDER — DIPHENHYDRAMINE HYDROCHLORIDE 50 MG/ML
12.5 INJECTION INTRAMUSCULAR; INTRAVENOUS
Status: DISCONTINUED | OUTPATIENT
Start: 2024-06-10 | End: 2024-06-10 | Stop reason: HOSPADM

## 2024-06-10 RX ORDER — FENTANYL CITRATE 0.05 MG/ML
25 INJECTION, SOLUTION INTRAMUSCULAR; INTRAVENOUS EVERY 5 MIN PRN
Status: DISCONTINUED | OUTPATIENT
Start: 2024-06-10 | End: 2024-06-10 | Stop reason: HOSPADM

## 2024-06-10 RX ADMIN — PROPOFOL 200 MG: 10 INJECTION, EMULSION INTRAVENOUS at 15:17

## 2024-06-10 RX ADMIN — MIDAZOLAM 2 MG: 1 INJECTION INTRAMUSCULAR; INTRAVENOUS at 15:11

## 2024-06-10 RX ADMIN — SODIUM CHLORIDE 3000 MG: 9 INJECTION, SOLUTION INTRAVENOUS at 15:22

## 2024-06-10 RX ADMIN — Medication 10 MG: at 16:00

## 2024-06-10 RX ADMIN — METHOCARBAMOL 1000 MG: 100 INJECTION, SOLUTION INTRAMUSCULAR; INTRAVENOUS at 16:28

## 2024-06-10 RX ADMIN — MORPHINE SULFATE 2 MG: 2 INJECTION, SOLUTION INTRAMUSCULAR; INTRAVENOUS at 16:25

## 2024-06-10 RX ADMIN — FENTANYL CITRATE 25 MCG: 50 INJECTION INTRAMUSCULAR; INTRAVENOUS at 16:36

## 2024-06-10 RX ADMIN — KETOROLAC TROMETHAMINE 30 MG: 30 INJECTION, SOLUTION INTRAMUSCULAR at 15:56

## 2024-06-10 RX ADMIN — ONDANSETRON 4 MG: 2 INJECTION INTRAMUSCULAR; INTRAVENOUS at 15:56

## 2024-06-10 RX ADMIN — LIDOCAINE HYDROCHLORIDE 100 MG: 20 INJECTION, SOLUTION INTRAVENOUS at 15:17

## 2024-06-10 RX ADMIN — Medication 30 MG: at 15:17

## 2024-06-10 RX ADMIN — ROCURONIUM BROMIDE 40 MG: 100 INJECTION INTRAVENOUS at 15:23

## 2024-06-10 RX ADMIN — METHOCARBAMOL 1000 MG: 100 INJECTION INTRAMUSCULAR; INTRAVENOUS at 16:28

## 2024-06-10 RX ADMIN — DEXAMETHASONE SODIUM PHOSPHATE 10 MG: 10 INJECTION, SOLUTION INTRAMUSCULAR; INTRAVENOUS at 15:23

## 2024-06-10 RX ADMIN — SODIUM CHLORIDE: 9 INJECTION, SOLUTION INTRAVENOUS at 12:54

## 2024-06-10 RX ADMIN — SODIUM CHLORIDE: 9 INJECTION, SOLUTION INTRAVENOUS at 15:50

## 2024-06-10 RX ADMIN — SUGAMMADEX 500 MG: 100 INJECTION, SOLUTION INTRAVENOUS at 15:57

## 2024-06-10 RX ADMIN — MORPHINE SULFATE 2 MG: 2 INJECTION, SOLUTION INTRAMUSCULAR; INTRAVENOUS at 16:50

## 2024-06-10 RX ADMIN — Medication 200 MG: at 15:17

## 2024-06-10 RX ADMIN — ROCURONIUM BROMIDE 10 MG: 100 INJECTION INTRAVENOUS at 15:17

## 2024-06-10 RX ADMIN — FENTANYL CITRATE 100 MCG: 50 INJECTION, SOLUTION INTRAMUSCULAR; INTRAVENOUS at 15:17

## 2024-06-10 RX ADMIN — FENTANYL CITRATE 25 MCG: 50 INJECTION INTRAMUSCULAR; INTRAVENOUS at 16:43

## 2024-06-10 RX ADMIN — Medication 10 MG: at 15:37

## 2024-06-10 ASSESSMENT — PAIN DESCRIPTION - DESCRIPTORS
DESCRIPTORS: SORE
DESCRIPTORS: DISCOMFORT
DESCRIPTORS: SORE

## 2024-06-10 ASSESSMENT — PAIN SCALES - GENERAL
PAINLEVEL_OUTOF10: 4
PAINLEVEL_OUTOF10: 7
PAINLEVEL_OUTOF10: 5
PAINLEVEL_OUTOF10: 3
PAINLEVEL_OUTOF10: 5

## 2024-06-10 ASSESSMENT — PAIN - FUNCTIONAL ASSESSMENT
PAIN_FUNCTIONAL_ASSESSMENT: 0-10

## 2024-06-10 ASSESSMENT — PAIN DESCRIPTION - LOCATION
LOCATION: ABDOMEN
LOCATION: ABDOMEN

## 2024-06-10 ASSESSMENT — PAIN DESCRIPTION - ORIENTATION
ORIENTATION: MID
ORIENTATION: MID

## 2024-06-10 ASSESSMENT — PAIN DESCRIPTION - PAIN TYPE
TYPE: SURGICAL PAIN
TYPE: SURGICAL PAIN

## 2024-06-10 ASSESSMENT — LIFESTYLE VARIABLES: SMOKING_STATUS: 1

## 2024-06-10 ASSESSMENT — PAIN DESCRIPTION - ONSET
ONSET: ON-GOING
ONSET: ON-GOING

## 2024-06-10 ASSESSMENT — PAIN DESCRIPTION - FREQUENCY
FREQUENCY: CONTINUOUS
FREQUENCY: CONTINUOUS

## 2024-06-10 NOTE — ANESTHESIA PRE PROCEDURE
Department of Anesthesiology  Preprocedure Note       Name:  Vinod Rodriguez Jr.   Age:  24 y.o.  :  2000                                          MRN:  [id number]         Date:  6/10/2024      Surgeon: Surgeon(s):  Malena Galloway MD    Procedure: Procedure(s):  HERNIA INCISIONAL REPAIR LAPAROSCOPIC ROBOTIC XI    Medications prior to admission:   Prior to Admission medications    Medication Sig Start Date End Date Taking? Authorizing Provider   oxyCODONE-acetaminophen (PERCOCET) 5-325 MG per tablet Take 1 tablet by mouth every 6 hours as needed for Pain for up to 3 days. Intended supply: 3 days. Take lowest dose possible to manage pain Max Daily Amount: 4 tablets 6/10/24 6/13/24 Yes Malena Galloway MD   methocarbamol (ROBAXIN-750) 750 MG tablet Take 1 tablet by mouth 4 times daily for 10 days 6/10/24 6/20/24 Yes Malena Galloway MD   ondansetron (ZOFRAN-ODT) 4 MG disintegrating tablet Take 1 tablet by mouth 3 times daily as needed for Nausea or Vomiting 24   Leonel Govea DO   famotidine (PEPCID) 40 MG tablet Take by mouth 23   Keenan Suresh MD   Multiple Vitamin (DAILY-PREM MULTIVITAMIN) TABS Take 1 tablet by mouth daily 24   Keenan Suresh MD   TOPAMAX 50 MG tablet Take 1 tablet by mouth daily For headaches 23   Keenan Suresh MD   ibuprofen (ADVIL;MOTRIN) 800 MG tablet Take 1 tablet by mouth every 8 hours as needed for Pain or Fever TAKE WITH FOOD. 24   Cata Martínez, APRN - CNP   albuterol sulfate HFA (PROVENTIL;VENTOLIN;PROAIR) 108 (90 Base) MCG/ACT inhaler Inhale 2 puffs into the lungs every 6 hours as needed for Wheezing or Shortness of Breath    Keenan Suresh MD   lisinopril (PRINIVIL;ZESTRIL) 10 MG tablet Take 1 tablet by mouth daily 22   Keenan Suresh MD   cetirizine (ZYRTEC) 10 MG tablet Take 1 tablet by mouth daily 22   Keenan Suresh MD   vitamin D (ERGOCALCIFEROL) 1.25 MG (05378 UT) CAPS capsule Take 1

## 2024-06-10 NOTE — ANESTHESIA POSTPROCEDURE EVALUATION
Department of Anesthesiology  Postprocedure Note    Patient: Vinod Rodriguez Jr.  MRN: [id number]  YOB: 2000  Date of evaluation: 6/10/2024    Procedure Summary       Date: 06/10/24 Room / Location: [address]    Anesthesia Start: 1510 Anesthesia Stop: 1609    Procedure: HERNIA INCISIONAL REPAIR LAPAROSCOPIC ROBOTIC XI (Abdomen) Diagnosis:       Incisional hernia      (Incisional hernia [K43.2])    Surgeons: Malena Galloway MD Responsible Provider: Conor Bowman MD    Anesthesia Type: general ASA Status: 3            Anesthesia Type: No value filed.    Hilda Phase I: Hilda Score: 4    Hilda Phase II:      Anesthesia Post Evaluation    Patient location during evaluation: PACU  Patient participation: complete - patient participated  Level of consciousness: awake  Airway patency: patent  Nausea & Vomiting: no nausea and no vomiting  Cardiovascular status: hemodynamically stable  Respiratory status: acceptable  Hydration status: euvolemic  Pain management: adequate    No notable events documented.

## 2024-06-10 NOTE — PROGRESS NOTES
Patient given snack. 250 cc fluid bolus given BP improved. Okay for patient to go to [facility] per Dr. Bowman.

## 2024-06-10 NOTE — OP NOTE
side.    Using ProGrasp and robotic scissors, I proceeded to take down omental adhesions from the anterior abdominal wall.  This was done carefully with adequate hemostasis.  Once the omentum was reduced from the hernia, a hernia defect in the midline was noted measuring 4.5 cm in greatest dimension.  At this point, we reduced the pneumoperitoneum to 10 mmHg.  I then proceeded to reapproximate the fascia in the midline using a running nonabsorbable V-Loc suture.  This was done without any significant tension.  Once this was done, a 4 x 6 inch Ventralight ST mesh was passed into the abdominal cavity and sutured in the center to be brought up to the anterior abdominal wall.  I then proceeded to secure the mesh circumferentially to the posterior rectus sheath fascia using running absorbable V-Loc sutures.  This was done with adequate overlap in all directions from the defect and the mesh was taut covering the anterior abdominal wall with no abnormality noted.    Once this was done, all needles were retrieved.  The pneumoperitoneum was released.  All needle, sponge, and instrument counts were correct.  The trocars were removed and the skin was reapproximated with 4 Monocryl suture.    The patient tolerated the procedure well.  There were no complications.  He was extubated, awakened from anesthesia and transferred to the postanesthesia care unit in stable condition.    Electronically signed by Malena Galloway MD on 6/10/2024 at 7:11 PM

## 2024-06-10 NOTE — H&P
an open procedure. All questions were answered to the patient's satisfaction and they freely signed the consent.         Malena Galloway MD  12:13 PM  6/10/2024

## 2024-06-10 NOTE — DISCHARGE INSTRUCTIONS
hours after surgery, and may take as many as 2 pills every 4 hours. After the first 36hours only take the pills as needed and stop them as soon as possible. Pain meds cause constipation so pay close attention to the \"bowels\" topic above.     Keep incisions clean and dry.  Vicodin/Percocet and ibuprofen for pain as prescribed. Okay to resume anticoagulant medication after 24hrs.      Do not exceed 4000mg of Tylenol/Acetaminophen per day. Vicodin/Norco/Percocet contain acetaminophen. Do not take additional amounts of Tylenol if you are taking these medications.    You are likely to have pain for the next few days. You may also feel like you have the flu, and you may have a low fever and feel tired and nauseated. This is common.  You should feel better after a few days and will probably feel much better in 7 days.    For several weeks you may feel twinges or pulling in the hernia repair when you move.    This care sheet gives you a general idea about how long it will take for you to recover. But each person recovers at a different pace. Follow the steps below to get better as quickly as possible.      Activity  Rest when you feel tired. Getting enough sleep will help you recover.  Try to walk each day. Start by walking a little more than you did the day before. Bit by bit, increase the amount you walk. Walking boosts blood flow and helps prevent pneumonia and constipation.  Avoid strenuous activities, such as biking, jogging, weight lifting, or aerobic exercise, until your doctor says it is okay.  Avoid lifting anything that would make you strain. This may include heavy grocery bags and milk containers, a heavy briefcase or backpack, cat litter or dog food bags, a vacuum , or a child.  You may drive when you are no longer taking pain medicine and can quickly move your foot from the gas pedal to the brake. You must also be able to sit comfortably for a long period of time, even if you do not plan to go far. You

## 2024-06-25 ENCOUNTER — OFFICE VISIT (OUTPATIENT)
Dept: SURGERY | Age: 24
End: 2024-06-25
Payer: COMMERCIAL

## 2024-06-25 VITALS — TEMPERATURE: 98.8 F | HEART RATE: 78 BPM | DIASTOLIC BLOOD PRESSURE: 84 MMHG | SYSTOLIC BLOOD PRESSURE: 148 MMHG

## 2024-06-25 DIAGNOSIS — Z98.890 S/P VENTRAL HERNIORRHAPHY: Primary | ICD-10-CM

## 2024-06-25 DIAGNOSIS — Z87.19 S/P VENTRAL HERNIORRHAPHY: Primary | ICD-10-CM

## 2024-06-25 PROCEDURE — 99212 OFFICE O/P EST SF 10 MIN: CPT | Performed by: SURGERY

## 2024-06-25 NOTE — PROGRESS NOTES
General Surgery Office Note  [facility]  Consharsh Galloway MD    Patient's Name/Date of Birth: Vinod Rodriguez Jr. / 2000    Date: June 25, 2024     Chief compaint: Postop visit from laparoscopic/robotic incisional hernia repair    Surgeon: MD Nilesh    Patient Active Problem List   Diagnosis    Other sprain of right middle finger, initial encounter    Tendinitis    Pain of finger of right hand    Perforated abdominal viscus    Diverticulitis of sigmoid colon    Morbid obesity (HCC)    Perforation of sigmoid colon due to diverticulitis    Primary hypertension    Diverticulitis    Incisional hernia, without obstruction or gangrene    Incisional hernia    S/P ventral herniorrhaphy       Subjective: Doing well, no new complaints, pain prior to surgery has resolved, tolerating diet, bowel function normal, anxious to return to normal physical activity    Objective:  BP (!) 148/84 (Site: Left Upper Arm, Position: Sitting, Cuff Size: Medium Adult)   Pulse 78   Temp 98.8 °F (37.1 °C)   Labs:  No results for input(s): \"WBC\", \"HGB\", \"HCT\" in the last 72 hours.    Invalid input(s): \"PLR\"  Lab Results   Component Value Date    CREATININE 0.7 05/30/2024    BUN 13 05/30/2024     05/30/2024    K 4.3 05/30/2024     05/30/2024    CO2 21 (L) 05/30/2024     No results for input(s): \"LIPASE\", \"AMYLASE\" in the last 72 hours.    General appearance: AA, NAD  HEENT: NCAT, PERRLA, EOMI  Lungs: Clear, equal rise bilateral  Heart: Reg  Abdomen: soft, nondistended, nontender, incisions well healed, no signs of infection, no cellulitis, no hematoma      Assessment/Plan:  Vinod Rodriguez Jr. is a 24 y.o. male 2 weeks s/p laparoscopic/robotic incisional hernia repair. Doing well.    Resume activity gradually   No heavy lifting more than 20lbs for 6 weeks total  Follow up as needed    Physician Signature: Electronically signed by Dr. Galloway  6/25/2024

## 2024-09-13 ENCOUNTER — APPOINTMENT (OUTPATIENT)
Dept: GENERAL RADIOLOGY | Age: 24
End: 2024-09-13
Payer: COMMERCIAL

## 2024-09-13 PROCEDURE — 87636 SARSCOV2 & INF A&B AMP PRB: CPT

## 2024-09-13 PROCEDURE — 71045 X-RAY EXAM CHEST 1 VIEW: CPT

## 2024-09-13 PROCEDURE — 99285 EMERGENCY DEPT VISIT HI MDM: CPT

## 2024-09-13 ASSESSMENT — LIFESTYLE VARIABLES
HOW MANY STANDARD DRINKS CONTAINING ALCOHOL DO YOU HAVE ON A TYPICAL DAY: PATIENT DOES NOT DRINK
HOW OFTEN DO YOU HAVE A DRINK CONTAINING ALCOHOL: NEVER

## 2024-09-14 ENCOUNTER — HOSPITAL ENCOUNTER (EMERGENCY)
Age: 24
Discharge: HOME | End: 2024-09-14
Attending: EMERGENCY MEDICINE
Payer: COMMERCIAL

## 2024-09-14 VITALS
OXYGEN SATURATION: 99 % | DIASTOLIC BLOOD PRESSURE: 58 MMHG | HEART RATE: 74 BPM | TEMPERATURE: 98.7 F | SYSTOLIC BLOOD PRESSURE: 106 MMHG | RESPIRATION RATE: 16 BRPM

## 2024-09-14 DIAGNOSIS — R07.9 CHEST PAIN, UNSPECIFIED TYPE: ICD-10-CM

## 2024-09-14 DIAGNOSIS — J02.9 ACUTE PHARYNGITIS, UNSPECIFIED ETIOLOGY: ICD-10-CM

## 2024-09-14 DIAGNOSIS — R06.00 DYSPNEA, UNSPECIFIED TYPE: Primary | ICD-10-CM

## 2024-09-14 LAB
ALBUMIN SERPL-MCNC: 3.9 G/DL (ref 3.5–5.2)
ALP SERPL-CCNC: 120 U/L (ref 40–129)
ALT SERPL-CCNC: 26 U/L (ref 0–40)
ANION GAP SERPL CALCULATED.3IONS-SCNC: 15 MMOL/L (ref 7–16)
AST SERPL-CCNC: 29 U/L (ref 0–39)
BILIRUB SERPL-MCNC: 0.2 MG/DL (ref 0–1.2)
BNP SERPL-MCNC: <26 PG/ML (ref 0–125)
BUN SERPL-MCNC: 15 MG/DL (ref 6–20)
CALCIUM SERPL-MCNC: 9.2 MG/DL (ref 8.6–10.2)
CHLORIDE SERPL-SCNC: 103 MMOL/L (ref 98–107)
CO2 SERPL-SCNC: 21 MMOL/L (ref 22–29)
CREAT SERPL-MCNC: 0.8 MG/DL (ref 0.7–1.2)
D-DIMER QUANTITATIVE: <200 NG/ML DDU (ref 0–230)
EKG ATRIAL RATE: 86 BPM
EKG P AXIS: 20 DEGREES
EKG P-R INTERVAL: 150 MS
EKG Q-T INTERVAL: 364 MS
EKG QRS DURATION: 86 MS
EKG QTC CALCULATION (BAZETT): 435 MS
EKG R AXIS: 68 DEGREES
EKG T AXIS: 19 DEGREES
EKG VENTRICULAR RATE: 86 BPM
ERYTHROCYTE [DISTWIDTH] IN BLOOD BY AUTOMATED COUNT: 13.2 % (ref 11.5–15)
FLUAV RNA RESP QL NAA+PROBE: NOT DETECTED
FLUBV RNA RESP QL NAA+PROBE: NOT DETECTED
GFR, ESTIMATED: >90 ML/MIN/1.73M2
GLUCOSE SERPL-MCNC: 108 MG/DL (ref 74–99)
HCT VFR BLD AUTO: 42.8 % (ref 37–54)
HGB BLD-MCNC: 14.1 G/DL (ref 12.5–16.5)
INR PPP: 1.2
LIPASE SERPL-CCNC: 28 U/L (ref 13–60)
MAGNESIUM SERPL-MCNC: 2.2 MG/DL (ref 1.6–2.6)
MCH RBC QN AUTO: 28.8 PG (ref 26–35)
MCHC RBC AUTO-ENTMCNC: 32.9 G/DL (ref 32–34.5)
MCV RBC AUTO: 87.5 FL (ref 80–99.9)
PLATELET # BLD AUTO: 292 K/UL (ref 130–450)
PMV BLD AUTO: 9.5 FL (ref 7–12)
POTASSIUM SERPL-SCNC: 3.8 MMOL/L (ref 3.5–5)
PROT SERPL-MCNC: 7.1 G/DL (ref 6.4–8.3)
PROTHROMBIN TIME: 12.7 SEC (ref 9.3–12.4)
RBC # BLD AUTO: 4.89 M/UL (ref 3.8–5.8)
SARS-COV-2 RNA RESP QL NAA+PROBE: NOT DETECTED
SODIUM SERPL-SCNC: 139 MMOL/L (ref 132–146)
SOURCE: NORMAL
SPECIMEN DESCRIPTION: NORMAL
TROPONIN I SERPL HS-MCNC: <6 NG/L (ref 0–11)
WBC OTHER # BLD: 10.2 K/UL (ref 4.5–11.5)

## 2024-09-14 PROCEDURE — 84484 ASSAY OF TROPONIN QUANT: CPT

## 2024-09-14 PROCEDURE — 93005 ELECTROCARDIOGRAM TRACING: CPT | Performed by: EMERGENCY MEDICINE

## 2024-09-14 PROCEDURE — 85610 PROTHROMBIN TIME: CPT

## 2024-09-14 PROCEDURE — 83880 ASSAY OF NATRIURETIC PEPTIDE: CPT

## 2024-09-14 PROCEDURE — 83690 ASSAY OF LIPASE: CPT

## 2024-09-14 PROCEDURE — 80053 COMPREHEN METABOLIC PANEL: CPT

## 2024-09-14 PROCEDURE — 83735 ASSAY OF MAGNESIUM: CPT

## 2024-09-14 PROCEDURE — 6370000000 HC RX 637 (ALT 250 FOR IP): Performed by: EMERGENCY MEDICINE

## 2024-09-14 PROCEDURE — 93010 ELECTROCARDIOGRAM REPORT: CPT | Performed by: INTERNAL MEDICINE

## 2024-09-14 PROCEDURE — 85027 COMPLETE CBC AUTOMATED: CPT

## 2024-09-14 PROCEDURE — 85379 FIBRIN DEGRADATION QUANT: CPT

## 2024-09-14 RX ADMIN — AMOXICILLIN AND CLAVULANATE POTASSIUM 1 TABLET: 875; 125 TABLET, FILM COATED ORAL at 01:06

## 2024-09-14 ASSESSMENT — PAIN - FUNCTIONAL ASSESSMENT: PAIN_FUNCTIONAL_ASSESSMENT: NONE - DENIES PAIN

## 2024-11-20 ENCOUNTER — HOSPITAL ENCOUNTER (EMERGENCY)
Age: 24
Discharge: HOME | End: 2024-11-20
Payer: COMMERCIAL

## 2024-11-20 VITALS
HEART RATE: 98 BPM | SYSTOLIC BLOOD PRESSURE: 142 MMHG | TEMPERATURE: 98.3 F | DIASTOLIC BLOOD PRESSURE: 74 MMHG | OXYGEN SATURATION: 98 % | RESPIRATION RATE: 14 BRPM

## 2024-11-20 DIAGNOSIS — M77.8 LEFT SHOULDER TENDONITIS: Primary | ICD-10-CM

## 2024-11-20 PROCEDURE — 96372 THER/PROPH/DIAG INJ SC/IM: CPT

## 2024-11-20 PROCEDURE — 99284 EMERGENCY DEPT VISIT MOD MDM: CPT

## 2024-11-20 PROCEDURE — 6360000002 HC RX W HCPCS: Performed by: NURSE PRACTITIONER

## 2024-11-20 RX ORDER — ORPHENADRINE CITRATE 30 MG/ML
60 INJECTION INTRAMUSCULAR; INTRAVENOUS ONCE
Status: COMPLETED | OUTPATIENT
Start: 2024-11-20 | End: 2024-11-20

## 2024-11-20 RX ORDER — CYCLOBENZAPRINE HCL 10 MG
10 TABLET ORAL 3 TIMES DAILY PRN
Qty: 21 TABLET | Refills: 0 | Status: SHIPPED | OUTPATIENT
Start: 2024-11-20 | End: 2024-11-30

## 2024-11-20 RX ORDER — DEXAMETHASONE SODIUM PHOSPHATE 10 MG/ML
10 INJECTION INTRAMUSCULAR; INTRAVENOUS ONCE
Status: COMPLETED | OUTPATIENT
Start: 2024-11-20 | End: 2024-11-20

## 2024-11-20 RX ORDER — OXYCODONE AND ACETAMINOPHEN 5; 325 MG/1; MG/1
1 TABLET ORAL ONCE
Status: DISCONTINUED | OUTPATIENT
Start: 2024-11-20 | End: 2024-11-20

## 2024-11-20 RX ORDER — OXYCODONE AND ACETAMINOPHEN 5; 325 MG/1; MG/1
1 TABLET ORAL EVERY 6 HOURS PRN
Qty: 12 TABLET | Refills: 0 | Status: SHIPPED | OUTPATIENT
Start: 2024-11-20 | End: 2024-11-23

## 2024-11-20 RX ORDER — METHYLPREDNISOLONE 4 MG/1
TABLET ORAL
Qty: 21 TABLET | Refills: 0 | Status: SHIPPED | OUTPATIENT
Start: 2024-11-20 | End: 2024-11-26

## 2024-11-20 RX ADMIN — ORPHENADRINE CITRATE 60 MG: 30 INJECTION, SOLUTION INTRAMUSCULAR; INTRAVENOUS at 10:49

## 2024-11-20 RX ADMIN — DEXAMETHASONE SODIUM PHOSPHATE 10 MG: 10 INJECTION INTRAMUSCULAR; INTRAVENOUS at 10:49

## 2024-11-20 ASSESSMENT — PAIN DESCRIPTION - DESCRIPTORS
DESCRIPTORS: ACHING;SHARP;DISCOMFORT
DESCRIPTORS: ACHING;SHARP;SHOOTING;SORE;SPASM

## 2024-11-20 ASSESSMENT — PAIN DESCRIPTION - ORIENTATION
ORIENTATION: LEFT
ORIENTATION: LEFT

## 2024-11-20 ASSESSMENT — PAIN SCALES - GENERAL
PAINLEVEL_OUTOF10: 10
PAINLEVEL_OUTOF10: 10

## 2024-11-20 ASSESSMENT — PAIN DESCRIPTION - PAIN TYPE: TYPE: ACUTE PAIN

## 2024-11-20 ASSESSMENT — PAIN DESCRIPTION - LOCATION
LOCATION: SHOULDER
LOCATION: SHOULDER

## 2024-11-20 ASSESSMENT — PAIN - FUNCTIONAL ASSESSMENT: PAIN_FUNCTIONAL_ASSESSMENT: 0-10

## 2024-11-20 ASSESSMENT — PAIN DESCRIPTION - FREQUENCY: FREQUENCY: CONTINUOUS

## 2024-11-20 ASSESSMENT — PAIN DESCRIPTION - ONSET: ONSET: ON-GOING

## 2024-11-20 NOTE — ED PROVIDER NOTES
Medications     Discharge Medication List as of 11/20/2024 11:21 AM        START taking these medications    Details   methylPREDNISolone (MEDROL, DINA,) 4 MG tablet Take by mouth., Disp-21 tablet, R-0Normal      oxyCODONE-acetaminophen (PERCOCET) 5-325 MG per tablet Take 1 tablet by mouth every 6 hours as needed for Pain for up to 3 days. Intended supply: 3 days. Take lowest dose possible to manage pain Max Daily Amount: 4 tablets, Disp-12 tablet, R-0Normal      cyclobenzaprine (FLEXERIL) 10 MG tablet Take 1 tablet by mouth 3 times daily as needed for Muscle spasms, Disp-21 tablet, R-0Normal           Electronically signed by CATIE Lyon CNP   DD: 11/20/24  **This report was transcribed using voice recognition software. Every effort was made to ensure accuracy; however, inadvertent computerized transcription errors may be present.  END OF ED PROVIDER NOTE

## 2024-11-20 NOTE — DISCHARGE INSTRUCTIONS
Continue you your plan to follow-up with orthopedics and physical therapy.  You may use the Flexeril 3 times daily as needed for muscle spasms.  Medrol Dosepak as directed.  You may use Percocet every 6 hours as needed for severe pain

## 2024-12-13 ENCOUNTER — PREP FOR PROCEDURE (OUTPATIENT)
Dept: BARIATRICS/WEIGHT MGMT | Age: 24
End: 2024-12-13

## 2024-12-13 ENCOUNTER — INITIAL CONSULT (OUTPATIENT)
Dept: BARIATRICS/WEIGHT MGMT | Age: 24
End: 2024-12-13
Payer: COMMERCIAL

## 2024-12-13 ENCOUNTER — TELEPHONE (OUTPATIENT)
Dept: BARIATRICS/WEIGHT MGMT | Age: 24
End: 2024-12-13

## 2024-12-13 VITALS
HEART RATE: 83 BPM | TEMPERATURE: 98.1 F | RESPIRATION RATE: 20 BRPM | SYSTOLIC BLOOD PRESSURE: 135 MMHG | DIASTOLIC BLOOD PRESSURE: 64 MMHG | HEIGHT: 67 IN | BODY MASS INDEX: 49.44 KG/M2 | WEIGHT: 315 LBS

## 2024-12-13 DIAGNOSIS — K21.9 GASTROESOPHAGEAL REFLUX DISEASE WITHOUT ESOPHAGITIS: ICD-10-CM

## 2024-12-13 DIAGNOSIS — Z13.89 SCREENING FOR SUBSTANCE ABUSE: ICD-10-CM

## 2024-12-13 DIAGNOSIS — E66.01 MORBID OBESITY DUE TO EXCESS CALORIES: Primary | ICD-10-CM

## 2024-12-13 PROCEDURE — 1036F TOBACCO NON-USER: CPT | Performed by: SURGERY

## 2024-12-13 PROCEDURE — 3078F DIAST BP <80 MM HG: CPT | Performed by: SURGERY

## 2024-12-13 PROCEDURE — 3075F SYST BP GE 130 - 139MM HG: CPT | Performed by: SURGERY

## 2024-12-13 PROCEDURE — 99214 OFFICE O/P EST MOD 30 MIN: CPT | Performed by: SURGERY

## 2024-12-13 PROCEDURE — G8484 FLU IMMUNIZE NO ADMIN: HCPCS | Performed by: SURGERY

## 2024-12-13 PROCEDURE — G8427 DOCREV CUR MEDS BY ELIG CLIN: HCPCS | Performed by: SURGERY

## 2024-12-13 PROCEDURE — G8417 CALC BMI ABV UP PARAM F/U: HCPCS | Performed by: SURGERY

## 2024-12-13 NOTE — TELEPHONE ENCOUNTER
Prior Authorization Form  DEMOGRAPHICS:    Patient Name:  Vinod Almeida Jr.  Patient :  2000            Insurance:  Payor: CIGNA / Plan: CIGNA / Product Type: *No Product type* /   Insurance ID Number:    Payer/Plan Subscr  Sex Relation Sub. Ins. ID Effective Group Num   1. [employer] * JUAN PABLOVINOD GARCIA* 2000 Male Employee [id number] 10/23/24                                    [address]   2. [employer] - CIGNA VINOD ALMEIDA* 2000 Male Self [id number]                                   [address]         DIAGNOSIS & PROCEDURE:    Procedure/Operation: egd           CPT Code: 16690    Diagnosis:  gerd    ICD10 Code: k21.9    Location:  [facility]    Surgeon:  Carlos    SCHEDULING INFORMATION:    Date: 24    Time:               Anesthesia:  MAC/TIVA                                                       Status:  Outpatient        Special Comments:         Electronically signed by CAYDEN PEREA MA on 2024 at 11:31 AM

## 2024-12-13 NOTE — PATIENT INSTRUCTIONS
What is the next step to proceed with weight loss surgery?    Please be aware that any co-pays or deductibles may be requested prior to testing and / or procedures.    You will need to schedule a psychological evaluation for weight loss surgery.  Patients will be required to complete all psychological testing as required by the mental health provider. Patients must also follow all of the provider's recommendations before weight loss surgery can be scheduled.     The evaluation must be done a standard way for weight loss surgery. We strongly recommend that you contact one of our preferred providers listed below to arrange this:      Dr. Darcy Goddard, PhD , [facility]  [address], [place], [place]    [phone]      Dr. Jerman Roque, PhD     [address] [place], [place]      [phone]    Dr. Ramona Carrion, [facility]  [address], [place], [place]   [phone]    [facility]     [address], [place], [place]    [phone]    Ely Pereyra, [facility] [address] , [address] [facility]  [phone]        You will also need to plan on attending a 2 hour nutrition class at the [facility] prior to your surgery.  We will schedule this for you when we schedule your surgery.    Please remember to have your labs drawn 10 days prior to your first scheduled dietary appointment.    Please remember, that while we will submit your case to insurance for surgery authorization, it is your responsibility to know if your plan covers weight loss surgery and keep up-to-date with changes to your insurance coverage.  We will do everything possible to help you get approved for weight loss surgery, but cannot guarantee an approval.     Please note that you will not be submitted to your insurance company until all pre-operative testing requirements are met.    In order to promote healing and prevent gastric ulcers, all patients MUST be

## 2024-12-13 NOTE — PROGRESS NOTES
drawings were used to explain anatomy and surgical procedures.     Follow up with surgical weight loss office after completed the above to begin dietary and nutritional counseling to achieve weight loss at or near 10% their excess body weight prior to surgery    Physician Signature: Electronically signed by Dr. Rg Gonzalez MD    Send copy of H&P to PCP, Cesar Brandt MD

## 2024-12-27 ENCOUNTER — HOSPITAL ENCOUNTER (OUTPATIENT)
Dept: ULTRASOUND IMAGING | Age: 24
Discharge: HOME | End: 2024-12-29
Attending: SURGERY
Payer: COMMERCIAL

## 2024-12-27 DIAGNOSIS — K21.9 GASTROESOPHAGEAL REFLUX DISEASE WITHOUT ESOPHAGITIS: ICD-10-CM

## 2024-12-27 PROCEDURE — 76705 ECHO EXAM OF ABDOMEN: CPT

## 2024-12-27 RX ORDER — ONDANSETRON 4 MG/1
TABLET, ORALLY DISINTEGRATING ORAL
COMMUNITY
Start: 2024-12-18

## 2024-12-27 NOTE — PROGRESS NOTES
[facility]                                                                                                                    PRE OP INSTRUCTIONS FOR  Vinod Palomaresfrancia Branch        Date: 12/27/2024    Date of surgery: 12/30/24   Arrival Time: Hospital will call you between 5pm and 7pm with your final arrival time for surgery. Go to     front of hospital and check in at information desk.    Nothing by mouth (NPO) as instructed. May have clear liquids up to 2 hours prior to surgery. Nothing solid after midnight. Examples: water, apple juice, black coffee, plain tea    Take the following medications with a small sip of water on the morning of Surgery: topamax, zyrtec, pepcid, bring inhaler.     Diabetics may take half the evening dose of insulin but none after midnight.  If you feel symptomatic or have low blood sugar morning of surgery drink 1-2 ounces of apple juice only. If you take a weekly insulin injection _______________, stop 7 days prior to surgery. If you take _______________, stop 3-4 days prior to surgery.    Aspirin, Ibuprofen, Advil, Naproxen, other Anti-inflammatory products should be stopped before surgery as directed by your surgeon, cardiologist, or primary care Doctor. Herbal supplements and Vitamin E should be stopped five days prior.  May take Tylenol unless instructed otherwise by your surgeon.    Check with your Doctor regarding stopping Plavix, Coumadin, Lovenox, Eliquis, Effient, or other blood thinners such as, pradaxa, lixiana, xaralto and savaysa.    Do not smoke, chew tobacco, vape, or use illicit drugs and do not drink any alcoholic beverages 24 hours prior to surgery.    You may brush your teeth the morning of surgery.      You MUST make arrangements for a responsible adult, 18 and over, to take you home after your surgery. You will not be allowed to leave alone or drive yourself home.  You will need someone stay with you the first 24 hrs. Your surgery will be  cancelled if you do not have a ride home or someone to stay with you.      Please wear simple, loose fitting clothing to the hospital.  Do not bring valuables (money, credit cards, checkbooks, etc.) Do not wear any makeup (including no eye makeup) or nail polish on your fingers or toes.    DO NOT wear any jewelry or piercings on day of surgery.  All body piercings and jewelry must be removed.    Shower the MORNING of surgery with an Antibacterial soap.      If you have a Living Will and/or Durable Power of  for Healthcare, please bring in a copy.    If appropriate bring crutches, inspirex, walker, cane etc...    Notify your Surgeon if you develop any illness between now and surgery time, cough, cold, fever, sore throat, nausea, vomiting, etc.  Please notify your surgeon if you experience dizziness, shortness of breath or blurred vision between now & the time of your surgery.    If you wear dentures, they will need to be removed before going into surgery, we will provide you with a container. If you wear contact lenses or glasses, they will need to be removed, please bring a case for them.    To provide excellent care visitors will be limited to 1 person in the room at any given time.                                                                                         No children under the age of 16 are permitted in the hospital for the safety of all patients.     Any implantable device requiring remote therapy, Please bring remote day of surgery and bring your implant card with you!      21. If you use a C-PAP please bring settings with you, do not bring the machine.    22. Other:                     Please call AMBULATORY CARE if you have any further questions.   Pre Admit Testing           [phone]     [facility] [phone]

## 2024-12-30 ENCOUNTER — HOSPITAL ENCOUNTER (OUTPATIENT)
Age: 24
Setting detail: OUTPATIENT SURGERY
Discharge: HOME | End: 2024-12-30
Attending: SURGERY | Admitting: SURGERY
Payer: COMMERCIAL

## 2024-12-30 ENCOUNTER — ANESTHESIA EVENT (OUTPATIENT)
Dept: ENDOSCOPY | Age: 24
End: 2024-12-30
Payer: COMMERCIAL

## 2024-12-30 ENCOUNTER — ANESTHESIA (OUTPATIENT)
Dept: ENDOSCOPY | Age: 24
End: 2024-12-30
Payer: COMMERCIAL

## 2024-12-30 VITALS
RESPIRATION RATE: 16 BRPM | TEMPERATURE: 97.7 F | WEIGHT: 315 LBS | OXYGEN SATURATION: 100 % | HEIGHT: 67 IN | HEART RATE: 77 BPM | DIASTOLIC BLOOD PRESSURE: 77 MMHG | SYSTOLIC BLOOD PRESSURE: 120 MMHG | BODY MASS INDEX: 49.44 KG/M2

## 2024-12-30 DIAGNOSIS — K21.9 ESOPHAGEAL REFLUX: ICD-10-CM

## 2024-12-30 LAB
25(OH)D3 SERPL-MCNC: 34.8 NG/ML (ref 30–100)
ALBUMIN SERPL-MCNC: 4.1 G/DL (ref 3.5–5.2)
ALP SERPL-CCNC: 118 U/L (ref 40–129)
ALT SERPL-CCNC: 44 U/L (ref 0–40)
ANION GAP SERPL CALCULATED.3IONS-SCNC: 12 MMOL/L (ref 7–16)
AST SERPL-CCNC: 29 U/L (ref 0–39)
BILIRUB SERPL-MCNC: 0.3 MG/DL (ref 0–1.2)
BUN SERPL-MCNC: 17 MG/DL (ref 6–20)
CALCIUM SERPL-MCNC: 9.2 MG/DL (ref 8.6–10.2)
CHLORIDE SERPL-SCNC: 101 MMOL/L (ref 98–107)
CHOLEST SERPL-MCNC: 157 MG/DL
CO2 SERPL-SCNC: 25 MMOL/L (ref 22–29)
CREAT SERPL-MCNC: 0.9 MG/DL (ref 0.7–1.2)
ERYTHROCYTE [DISTWIDTH] IN BLOOD BY AUTOMATED COUNT: 13.1 % (ref 11.5–15)
FERRITIN SERPL-MCNC: 301 NG/ML
FOLATE SERPL-MCNC: 15.5 NG/ML (ref 4.8–24.2)
GFR, ESTIMATED: >90 ML/MIN/1.73M2
GLUCOSE SERPL-MCNC: 86 MG/DL (ref 74–99)
HBA1C MFR BLD: 5.7 % (ref 4–5.6)
HCT VFR BLD AUTO: 46.1 % (ref 37–54)
HDLC SERPL-MCNC: 49 MG/DL
HGB BLD-MCNC: 15.3 G/DL (ref 12.5–16.5)
LDLC SERPL CALC-MCNC: 95 MG/DL
MCH RBC QN AUTO: 29.1 PG (ref 26–35)
MCHC RBC AUTO-ENTMCNC: 33.2 G/DL (ref 32–34.5)
MCV RBC AUTO: 87.8 FL (ref 80–99.9)
PLATELET # BLD AUTO: 308 K/UL (ref 130–450)
PMV BLD AUTO: 10.5 FL (ref 7–12)
POTASSIUM SERPL-SCNC: 3.6 MMOL/L (ref 3.5–5)
PREALB SERPL-MCNC: 26 MG/DL (ref 20–40)
PROT SERPL-MCNC: 7.7 G/DL (ref 6.4–8.3)
RBC # BLD AUTO: 5.25 M/UL (ref 3.8–5.8)
SODIUM SERPL-SCNC: 138 MMOL/L (ref 132–146)
TRIGL SERPL-MCNC: 65 MG/DL
TSH SERPL DL<=0.05 MIU/L-ACNC: 4.51 UIU/ML (ref 0.27–4.2)
VIT B12 SERPL-MCNC: 505 PG/ML (ref 211–946)
VLDLC SERPL CALC-MCNC: 13 MG/DL
WBC OTHER # BLD: 17.2 K/UL (ref 4.5–11.5)

## 2024-12-30 PROCEDURE — 43239 EGD BIOPSY SINGLE/MULTIPLE: CPT | Performed by: SURGERY

## 2024-12-30 PROCEDURE — 82306 VITAMIN D 25 HYDROXY: CPT

## 2024-12-30 PROCEDURE — 84425 ASSAY OF VITAMIN B-1: CPT

## 2024-12-30 PROCEDURE — 2709999900 HC NON-CHARGEABLE SUPPLY: Performed by: SURGERY

## 2024-12-30 PROCEDURE — 82607 VITAMIN B-12: CPT

## 2024-12-30 PROCEDURE — G0480 DRUG TEST DEF 1-7 CLASSES: HCPCS

## 2024-12-30 PROCEDURE — 84590 ASSAY OF VITAMIN A: CPT

## 2024-12-30 PROCEDURE — 82746 ASSAY OF FOLIC ACID SERUM: CPT

## 2024-12-30 PROCEDURE — 83036 HEMOGLOBIN GLYCOSYLATED A1C: CPT

## 2024-12-30 PROCEDURE — 88305 TISSUE EXAM BY PATHOLOGIST: CPT

## 2024-12-30 PROCEDURE — 82728 ASSAY OF FERRITIN: CPT

## 2024-12-30 PROCEDURE — 36415 COLL VENOUS BLD VENIPUNCTURE: CPT

## 2024-12-30 PROCEDURE — 84630 ASSAY OF ZINC: CPT

## 2024-12-30 PROCEDURE — 7100000010 HC PHASE II RECOVERY - FIRST 15 MIN: Performed by: SURGERY

## 2024-12-30 PROCEDURE — 3700000000 HC ANESTHESIA ATTENDED CARE: Performed by: SURGERY

## 2024-12-30 PROCEDURE — 3609012400 HC EGD TRANSORAL BIOPSY SINGLE/MULTIPLE: Performed by: SURGERY

## 2024-12-30 PROCEDURE — 85027 COMPLETE CBC AUTOMATED: CPT

## 2024-12-30 PROCEDURE — 84443 ASSAY THYROID STIM HORMONE: CPT

## 2024-12-30 PROCEDURE — 7100000011 HC PHASE II RECOVERY - ADDTL 15 MIN: Performed by: SURGERY

## 2024-12-30 PROCEDURE — 84134 ASSAY OF PREALBUMIN: CPT

## 2024-12-30 PROCEDURE — 88342 IMHCHEM/IMCYTCHM 1ST ANTB: CPT

## 2024-12-30 PROCEDURE — 80061 LIPID PANEL: CPT

## 2024-12-30 PROCEDURE — 3700000001 HC ADD 15 MINUTES (ANESTHESIA): Performed by: SURGERY

## 2024-12-30 PROCEDURE — 80053 COMPREHEN METABOLIC PANEL: CPT

## 2024-12-30 RX ORDER — SODIUM CHLORIDE 0.9 % (FLUSH) 0.9 %
5-40 SYRINGE (ML) INJECTION PRN
Status: DISCONTINUED | OUTPATIENT
Start: 2024-12-30 | End: 2024-12-30 | Stop reason: HOSPADM

## 2024-12-30 RX ORDER — SODIUM CHLORIDE 9 MG/ML
25 INJECTION, SOLUTION INTRAVENOUS PRN
Status: DISCONTINUED | OUTPATIENT
Start: 2024-12-30 | End: 2024-12-30 | Stop reason: HOSPADM

## 2024-12-30 RX ORDER — SODIUM CHLORIDE 9 MG/ML
INJECTION, SOLUTION INTRAVENOUS CONTINUOUS
Status: DISCONTINUED | OUTPATIENT
Start: 2024-12-30 | End: 2024-12-30 | Stop reason: HOSPADM

## 2024-12-30 RX ORDER — SODIUM CHLORIDE 0.9 % (FLUSH) 0.9 %
5-40 SYRINGE (ML) INJECTION EVERY 12 HOURS SCHEDULED
Status: DISCONTINUED | OUTPATIENT
Start: 2024-12-30 | End: 2024-12-30 | Stop reason: HOSPADM

## 2024-12-30 ASSESSMENT — ENCOUNTER SYMPTOMS: SHORTNESS OF BREATH: 1

## 2024-12-30 ASSESSMENT — PAIN - FUNCTIONAL ASSESSMENT
PAIN_FUNCTIONAL_ASSESSMENT: NONE - DENIES PAIN
PAIN_FUNCTIONAL_ASSESSMENT: NONE - DENIES PAIN

## 2024-12-30 NOTE — H&P
Vinod Rodriguez Jr.  12/30/2024  [facility]    Initial Evaluation  Bariatric Surgery and EGD       Subjective:  CHIEF COMPLAINT: Morbid obesity, malnutrition, Type 2 Diabetes Mellitus, Hypertension, and Hyperlipidemia    HISTORY OF PRESENT ILLNESS: Vinod Rodriguez Jr. is a morbidly-obese 24 y.o.  male, who weighs (!) 163.3 kg (360 lb). He is 192 pounds over his ideal body weight. The severity is severe with Body mass index is 56.38 kg/m².  He has multiple medical problems aggravated by his obesity. They have been overweight since age 19. He wishes to have bariatric surgery so that he can lose a large amount of weight to a goal of their ideal body weight based on height, build and sex, and keep the weight off. I have met with him in the [facility] where we discussed the surgery in great detail and went over the risks and benefits. He has watched our informational video so he understands all of the extensive risks involved. He states that he understands all of the risks and wishes to proceed with the evaluation. We have discussed the different surgical options in detail with use of diagrams and drawings to detail the procedures, risks and alternatives. We discussed the postoperative diet and proposed life long diet for weight management after surgery.     They are a nonsmoker  They have no significant exposure to second hand smoke    Past Medical History  Patient Active Problem List   Diagnosis    Other sprain of right middle finger, initial encounter    Tendinitis    Pain of finger of right hand    Perforated abdominal viscus    Diverticulitis of sigmoid colon    Morbid obesity    Perforation of sigmoid colon due to diverticulitis    Primary hypertension    Diverticulitis    Incisional hernia, without obstruction or gangrene    Incisional hernia    S/P ventral herniorrhaphy    Acute pharyngitis    Chest pain    Dyspnea    Esophageal reflux     Past Surgical  management- dietary and nutritional counseling provided for 15min during our conversation with emphasis on protein intake, low calorie, limit late night eating and eating away from the table with other distractions.    - Comprehensive Metabolic Panel; Future  - CBC; Future  - Hemoglobin A1C; Future  - Ferritin; Future  - Triglyceride; Future  - Cholesterol, Total; Future  - Zinc; Future  - Vitamin B12; Future  - Folate; Future  - Vitamin B1; Future  - Vitamin D 25 Hydroxy; Future  - Prealbumin; Future  - US GALLBLADDER RUQ; Scheduled     3. Gastroesophageal reflux disease without esophagitis  RUQ US eval gallbladder disease  EGD eval H pylori/gastritis  Continue tums prn        BASED ON THEIR TOTAL WEIGHT LOSS GOAL AND MEDICAL PROBLEMS I RECOMMEND SLEEVE      Psych evaluation, medical clearance, right upper quadrant ultrasound to evaluate for fatty liver disease and gallbladder abnormalities. These patients often have vitamin deficiencies so I will do screening labs for malnutrition. I will do an upper endoscopy to check for hiatal hernias, ulcers and H. Pylori while we wait for insurance approval for the surgery.    I have spent over 45min in evaluation of the patient including greater than 50% of that time face to face discussing surgical options, medical and surgical history, plans for medical weight loss management and preoperative clearance for surgery. They did not have family present for the discussion and visual aides and drawings were used to explain anatomy and surgical procedures.     Follow up with surgical weight loss office after completed the above to begin dietary and nutritional counseling to achieve weight loss at or near 10% their excess body weight prior to surgery    Physician Signature: Electronically signed by Dr. Benz name on file    Send copy of H&P to PCP, Mikayla Coulter APN

## 2024-12-30 NOTE — ANESTHESIA PRE PROCEDURE
AM       HCG (If Applicable): No results found for: \"PREGTESTUR\", \"PREGSERUM\", \"HCG\", \"HCGQUANT\"     ABGs: No results found for: \"PHART\", \"PO2ART\", \"YHH8TDC\", \"SBV4FYH\", \"BEART\", \"J3UOJCGT\"     Type & Screen (If Applicable):  No results found for: \"ABORH\", \"LABANTI\"    Drug/Infectious Status (If Applicable):  No results found for: \"HIV\", \"HEPCAB\"    COVID-19 Screening (If Applicable):   Lab Results   Component Value Date/Time    COVID19 Not Detected 09/13/2024 11:30 PM           Anesthesia Evaluation    Airway: Mallampati: II  TM distance: >3 FB   Neck ROM: full  Mouth opening: > = 3 FB   Dental: normal exam         Pulmonary:   (+)   shortness of breath:   sleep apnea:   decreased breath sounds: bilateral    asthma:                            Cardiovascular:    (+) hypertension:        Rhythm: regular  Rate: normal                    Neuro/Psych:   (+) psychiatric history:depression/anxiety             GI/Hepatic/Renal:   (+) GERD:, morbid obesity          Endo/Other:                     Abdominal:   (+) obese          Vascular:          Other Findings:             Anesthesia Plan      MAC     ASA 3             Anesthetic plan and risks discussed with patient.        Attending anesthesiologist reviewed and agrees with Preprocedure content                CATIE Viera - CRNA   12/30/2024

## 2024-12-30 NOTE — ANESTHESIA POSTPROCEDURE EVALUATION
Department of Anesthesiology  Postprocedure Note    Patient: Vinod Rodriguez Jr.  MRN: [id number]  YOB: 2000  Date of evaluation: 12/30/2024    Procedure Summary       Date: 12/30/24 Room / Location: [address]    Anesthesia Start: 1216 Anesthesia Stop: 1233    Procedure: ESOPHAGOGASTRODUODENOSCOPY BIOPSY Diagnosis:       Esophageal reflux      (Esophageal reflux [K21.9])    Surgeons: Rg Gonzalez MD Responsible Provider: Magdi John MD    Anesthesia Type: MAC ASA Status: 3            Anesthesia Type: No value filed.    Hilda Phase I: Hilda Score: 10    Hilda Phase II: Hilda Score: 10    Anesthesia Post Evaluation    Patient location during evaluation: bedside  Patient participation: complete - patient participated  Level of consciousness: awake  Pain score: 0  Airway patency: patent  Nausea & Vomiting: no vomiting and no nausea  Cardiovascular status: hemodynamically stable  Respiratory status: acceptable  Hydration status: stable  Pain management: adequate        No notable events documented.

## 2024-12-30 NOTE — OP NOTE
[facility]  [facility]           Operative Report    DATE OF PROCEDURE: 12/30/2024    Vinod Rodriguez Jr.    SURGEON: Rg Gonzalez MD.     ASSISTANT: None    PREOPERATIVE DIAGNOSES:  GERD, morbid obesity, bloating    POSTOPERATIVE DIAGNOSES:   (1) No Hiatal Hernia, Effacement Hill Grade I  (2) No Esophagitis  (3) Mild Gastritis  (4) No Duodenitis    OPERATION: Kuvlolke-emwcyp-ttrtocqvneey with antral biopsies    ANESTHESIA: LMAC    EBL: None    COMPLICATIONS: None.     History and consent:  This is a 24 y.o. year old male who is having GERD with plans to pursue surgical weight loss.  I have discussed with the patient the indication, alternatives, and the possible risks and/or complications of upper endoscopy and the conscious sedation anesthesia. The patient and/or family understands and agrees to proceed.    Monitoring and Safety:    The patient was placed on a cardiac monitor and vital signs, pulse oximetry and level of consciousness were continuously evaluated throughout the procedure. The patient was closely monitored until recovery from the medications was complete and the patient had returned to baseline status. Anesthesia was present at all times during the procedure.    OPERATIONS: The patient was placed on the table and sedated while blood pressure, pulse and pulse oximetry were continuously monitored by the anesthesia team. A bite block was placed prior to sedation and the patient was placed in the left lateral decubitus position. A lubricated scope was easily passed into the upper esophagus which looked normal. The distal esophagus looked normal. The scope was passed into the stomach and retroflexed. Hill Grade I effacement.  The gastroesophageal junction was at 40cm.  There was No hiatal hernia. The scope was passed down toward the pylorus. The antral mucosa showed mild gastritis without ulcerations. Biopsy was taken to check for H. pylori.

## 2025-01-01 LAB
RETINYL PALMITATE: <0.02 MG/L (ref 0–0.1)
VITAMIN A LEVEL: 0.82 MG/L (ref 0.3–1.2)
VITAMIN A, INTERP: NORMAL
ZINC SERPL-MCNC: 75.3 UG/DL (ref 60–120)

## 2025-01-02 LAB
COTININE: <5 NG/ML
NICOTINE: <5 NG/ML
VIT B1 SERPL-SCNC: 4 NMOL/L (ref 4–15)

## 2025-01-03 LAB — SURGICAL PATHOLOGY REPORT: NORMAL

## 2025-01-07 ENCOUNTER — INITIAL CONSULT (OUTPATIENT)
Dept: BARIATRICS/WEIGHT MGMT | Age: 25
End: 2025-01-07
Payer: COMMERCIAL

## 2025-01-07 VITALS — WEIGHT: 315 LBS | HEIGHT: 67 IN | BODY MASS INDEX: 49.44 KG/M2

## 2025-01-07 DIAGNOSIS — Z00.8 NUTRITIONAL ASSESSMENT: ICD-10-CM

## 2025-01-07 DIAGNOSIS — E66.01 MORBID OBESITY DUE TO EXCESS CALORIES: Primary | ICD-10-CM

## 2025-01-07 DIAGNOSIS — Z71.3 NUTRITIONAL COUNSELING: ICD-10-CM

## 2025-01-07 DIAGNOSIS — E66.01 MORBID OBESITY: ICD-10-CM

## 2025-01-07 DIAGNOSIS — Z71.3 ENCOUNTER FOR DIETARY COUNSELING AND SURVEILLANCE: ICD-10-CM

## 2025-01-07 DIAGNOSIS — Z02.6 ENCOUNTER FOR EXAMINATION FOR INSURANCE PURPOSES: ICD-10-CM

## 2025-01-07 PROCEDURE — 97802 MEDICAL NUTRITION INDIV IN: CPT | Performed by: DIETITIAN, REGISTERED

## 2025-01-07 PROCEDURE — G8417 CALC BMI ABV UP PARAM F/U: HCPCS | Performed by: DIETITIAN, REGISTERED

## 2025-01-07 PROCEDURE — G8428 CUR MEDS NOT DOCUMENT: HCPCS | Performed by: DIETITIAN, REGISTERED

## 2025-01-07 NOTE — PROGRESS NOTES
Initial Consult for this pt was completed today: 1/7/2025. This patient received Medical Nutrition Management; initial assessment and intervention, in an individual, face-to-face encounter with an RD/LD at the [facility].        [facility]:  Initial Nutrition Consultation    Today's Date: 1/7/2025  Patients Name:Vinod Rodriguez Jr.     Height: 1.702 m (5' 7\")   Weight: (!) 161 kg (355 lb)   IBW: 168 lbs  %IBW: 211%  Excess Weight: 187 lbs  BMI: Body mass index is 55.6 kg/m².     Subjective Information:   Patient has tried multiple means of weight loss including diet and exercise in the past and has been unable to maintain a healthy weight.  Pt states he / she has tried the following  - Self Imposed Diet consuming only Diet Pop and water, Self-Imposed Portion Controlled Diet and Self-Imposed diet watching sugar intake. Patients overall goal is to be able to lose weight with diet and exercise by changing lifestyle, habits and maintain a healthy weight for a lifetime.    Are your currently Diabetic - Yes  Type 2 Diabetic  - Yes - Pt states pre-diabetic and he stopped the use of Metformin d/t not tolerating the medication.  Medication to Control Diabetes  - Diet Controlled    Most successful diet in the past? None -  Pt states he has tried multiple diets with lots of effort but no success.   Length of time patient was on the diet listed above? 1 Month  Weight lost on the diet listed above? 1 lb  Patient stated he / she maintained his / her weight for the following time? Pt regained her weight back once she stopped.     Patient takes the following vitamins, minerals and dietary supplements?Yes - I currently take a Daily Geraldine and Vitamin D2 50,000iu once a week.  Rd / Ld recommends the patient continue the following supplements from now until surgery.     Patient consumes the following beverage daily? Water and Zero Sugar Soda    Patient states he / she

## 2025-01-17 ENCOUNTER — OFFICE VISIT (OUTPATIENT)
Dept: BARIATRICS/WEIGHT MGMT | Age: 25
End: 2025-01-17
Payer: COMMERCIAL

## 2025-01-17 VITALS
BODY MASS INDEX: 49.44 KG/M2 | HEART RATE: 82 BPM | SYSTOLIC BLOOD PRESSURE: 122 MMHG | DIASTOLIC BLOOD PRESSURE: 62 MMHG | HEIGHT: 67 IN | WEIGHT: 315 LBS

## 2025-01-17 DIAGNOSIS — E66.01 MORBID OBESITY DUE TO EXCESS CALORIES: Primary | ICD-10-CM

## 2025-01-17 DIAGNOSIS — K21.9 GASTROESOPHAGEAL REFLUX DISEASE WITHOUT ESOPHAGITIS: ICD-10-CM

## 2025-01-17 DIAGNOSIS — R79.89 ELEVATED TSH: ICD-10-CM

## 2025-01-17 PROCEDURE — G8427 DOCREV CUR MEDS BY ELIG CLIN: HCPCS | Performed by: NURSE PRACTITIONER

## 2025-01-17 PROCEDURE — 3074F SYST BP LT 130 MM HG: CPT | Performed by: NURSE PRACTITIONER

## 2025-01-17 PROCEDURE — G8417 CALC BMI ABV UP PARAM F/U: HCPCS | Performed by: NURSE PRACTITIONER

## 2025-01-17 PROCEDURE — 99213 OFFICE O/P EST LOW 20 MIN: CPT | Performed by: NURSE PRACTITIONER

## 2025-01-17 PROCEDURE — 1036F TOBACCO NON-USER: CPT | Performed by: NURSE PRACTITIONER

## 2025-01-17 PROCEDURE — 3078F DIAST BP <80 MM HG: CPT | Performed by: NURSE PRACTITIONER

## 2025-01-17 NOTE — PROGRESS NOTES
Vinod Rodriguez Jr.  1/17/2025  [facility]        Chief Complaint   Patient presents with    Results     Labs/EGD/USGB       Post-EGD Follow-up.      Subjective:   Vinod Rodriguez Jr. is a 24 y.o. male post EGD done 2 weeks ago. He did not have a hiatal hernia, did not have gastritis. Biopsy did not show Helicobacter pylori. The gallbladder ultrasound showed no stones.     Patient has no concerns at this time.     Patient is planning on bariatric surgery with Dr. Gonzalez.    Weight: (!) 162.4 kg (358 lb).    No Known Allergies  Prior to Admission medications    Medication Sig Start Date End Date Taking? Authorizing Provider   ondansetron (ZOFRAN-ODT) 4 MG disintegrating tablet Place 2 tablets twice a day by translingual route for 7 days. 12/18/24  Yes Keenan Suresh MD   famotidine (PEPCID) 40 MG tablet Take by mouth 6/11/23  Yes ProviderKeenan MD   Multiple Vitamin (DAILY-PREM MULTIVITAMIN) TABS Take 1 tablet by mouth daily 5/7/24  Yes ProviderKeenan MD   TOPAMAX 50 MG tablet Take 1 tablet by mouth daily For headaches 6/14/23  Yes ProviderKeenan MD   ibuprofen (ADVIL;MOTRIN) 800 MG tablet Take 1 tablet by mouth every 8 hours as needed for Pain or Fever TAKE WITH FOOD. 5/27/24  Yes Cata Martínez, CATIE - CNP   albuterol sulfate HFA (PROVENTIL;VENTOLIN;PROAIR) 108 (90 Base) MCG/ACT inhaler Inhale 2 puffs into the lungs every 6 hours as needed for Wheezing or Shortness of Breath   Yes ProviderKeenan MD   lisinopril (PRINIVIL;ZESTRIL) 10 MG tablet Take 1 tablet by mouth daily 2/1/22  Yes Keenan Suresh MD   cetirizine (ZYRTEC) 10 MG tablet Take 1 tablet by mouth daily 2/1/22  Yes Keenan Suresh MD   vitamin D (ERGOCALCIFEROL) 1.25 MG (25224 UT) CAPS capsule Take 1 capsule by mouth once a week Monday 12/7/21  Yes ProviderKeenan MD     Past Medical History:   Diagnosis Date    Arthritis     Asperger syndrome     Asthma

## 2025-01-17 NOTE — PATIENT INSTRUCTIONS
What is the next step to proceed with weight loss surgery?    Please be aware that any co-pays or deductibles may be requested prior to testing and / or procedures.    You will need to schedule a psychological evaluation for weight loss surgery.  Patients will be required to complete all psychological testing as required by the mental health provider. Patients must also follow all of the provider's recommendations before weight loss surgery can be scheduled.     The evaluation must be done a standard way for weight loss surgery. We strongly recommend that you contact one of our preferred providers listed below to arrange this:      Dr. Darcy Goddard, PhD , [facility]  [address], [place], [place]    [phone]      Dr. Jerman Roque, PhD     [address] [place], [place]      [phone]    Dr. Ramona Carrion, [facility]  [address], [place], [place]   [phone]    Ely Pereyra, [facility] [address] , [address] [facility]  [phone]        You will also need to plan on attending a 2 hour nutrition class at the [facility] prior to your surgery.  We will schedule this for you when we schedule your surgery.    Please remember to have your labs drawn 10 days prior to your first scheduled dietary appointment.    Please remember, that while we will submit your case to insurance for surgery authorization, it is your responsibility to know if your plan covers weight loss surgery and keep up-to-date with changes to your insurance coverage.  We will do everything possible to help you get approved for weight loss surgery, but cannot guarantee an approval.     Please note that you will not be submitted to your insurance company until all pre-operative testing requirements are met.    In order to promote healing and prevent gastric ulcers, all patients MUST be nicotine free for 3 months prior to surgery. All patients will be nicotine

## 2025-02-03 ENCOUNTER — APPOINTMENT (OUTPATIENT)
Dept: GENERAL RADIOLOGY | Age: 25
End: 2025-02-03
Payer: COMMERCIAL

## 2025-02-03 ENCOUNTER — HOSPITAL ENCOUNTER (EMERGENCY)
Age: 25
Discharge: LEFT BEFORE DISCHARGE | End: 2025-02-03
Payer: COMMERCIAL

## 2025-02-03 VITALS
SYSTOLIC BLOOD PRESSURE: 140 MMHG | OXYGEN SATURATION: 98 % | RESPIRATION RATE: 16 BRPM | HEIGHT: 67 IN | HEART RATE: 101 BPM | DIASTOLIC BLOOD PRESSURE: 85 MMHG | WEIGHT: 315 LBS | BODY MASS INDEX: 49.44 KG/M2 | TEMPERATURE: 98.6 F

## 2025-02-03 PROCEDURE — 4500000002 HC ER NO CHARGE

## 2025-02-03 PROCEDURE — 73030 X-RAY EXAM OF SHOULDER: CPT

## 2025-02-03 ASSESSMENT — PAIN DESCRIPTION - ORIENTATION: ORIENTATION: LEFT

## 2025-02-03 ASSESSMENT — PAIN - FUNCTIONAL ASSESSMENT: PAIN_FUNCTIONAL_ASSESSMENT: 0-10

## 2025-02-03 ASSESSMENT — PAIN DESCRIPTION - LOCATION: LOCATION: SHOULDER

## 2025-02-03 ASSESSMENT — LIFESTYLE VARIABLES: HOW OFTEN DO YOU HAVE A DRINK CONTAINING ALCOHOL: MONTHLY OR LESS

## 2025-02-03 ASSESSMENT — PAIN SCALES - GENERAL: PAINLEVEL_OUTOF10: 8

## 2025-02-03 ASSESSMENT — PAIN DESCRIPTION - DESCRIPTORS: DESCRIPTORS: BURNING;TINGLING

## 2025-02-04 ENCOUNTER — HOSPITAL ENCOUNTER (EMERGENCY)
Age: 25
Discharge: HOME | End: 2025-02-04

## 2025-02-04 VITALS
DIASTOLIC BLOOD PRESSURE: 75 MMHG | OXYGEN SATURATION: 96 % | SYSTOLIC BLOOD PRESSURE: 145 MMHG | HEART RATE: 62 BPM | TEMPERATURE: 97.7 F | RESPIRATION RATE: 18 BRPM

## 2025-02-04 DIAGNOSIS — S46.912A STRAIN OF LEFT SHOULDER, INITIAL ENCOUNTER: Primary | ICD-10-CM

## 2025-02-04 PROCEDURE — 99283 EMERGENCY DEPT VISIT LOW MDM: CPT

## 2025-02-04 RX ORDER — LIDOCAINE 50 MG/G
1 PATCH TOPICAL DAILY
Qty: 10 PATCH | Refills: 0 | Status: SHIPPED | OUTPATIENT
Start: 2025-02-04 | End: 2025-02-14

## 2025-02-04 RX ORDER — IBUPROFEN 600 MG/1
600 TABLET, FILM COATED ORAL 3 TIMES DAILY PRN
Qty: 30 TABLET | Refills: 0 | Status: SHIPPED | OUTPATIENT
Start: 2025-02-04

## 2025-02-04 ASSESSMENT — PAIN DESCRIPTION - ORIENTATION: ORIENTATION: LEFT

## 2025-02-04 ASSESSMENT — PAIN DESCRIPTION - FREQUENCY: FREQUENCY: CONTINUOUS

## 2025-02-04 ASSESSMENT — PAIN DESCRIPTION - ONSET: ONSET: SUDDEN

## 2025-02-04 ASSESSMENT — PAIN DESCRIPTION - LOCATION: LOCATION: SHOULDER

## 2025-02-04 ASSESSMENT — PAIN - FUNCTIONAL ASSESSMENT: PAIN_FUNCTIONAL_ASSESSMENT: 0-10

## 2025-02-04 ASSESSMENT — PAIN SCALES - GENERAL: PAINLEVEL_OUTOF10: 8

## 2025-02-04 ASSESSMENT — PAIN DESCRIPTION - PAIN TYPE: TYPE: ACUTE PAIN

## 2025-02-04 ASSESSMENT — PAIN DESCRIPTION - DESCRIPTORS: DESCRIPTORS: BURNING;ACHING

## 2025-02-04 NOTE — DISCHARGE INSTR - COC
Continuity of Care Form    Patient Name: Vinod Rodriguez Jr.   :  2000  MRN:  [id number]    Admit date:  2025  Discharge date:  ***    Code Status Order: Prior   Advance Directives:   Advance Care Flowsheet Documentation             Admitting Physician:  No admitting provider for patient encounter.  PCP: Mikayla Coulter APN    Discharging Nurse: ***  Discharging Hospital Unit/Room#: FRANDY/FRANDY  Discharging Unit Phone Number: ***    Emergency Contact:   Extended Emergency Contact Information  Primary Emergency Contact: Vinod Rodriguez   Address: [address] [place] [address]  Home Phone: [phone]  Mobile Phone: [phone]  Relation: Parent  Hearing or visual needs: None  Other needs: None  Preferred language: English   needed? No  Secondary Emergency Contact: Sandrita Rodriguez  Address: [address] [place] [address]  Home Phone: [phone]  Mobile Phone: [phone]  Relation: Other  Hearing or visual needs: None  Other needs: None  Preferred language: English   needed? No    Past Surgical History:  Past Surgical History:   Procedure Laterality Date    ADENOIDECTOMY      BOWEL RESECTION  03/15/2023    BOWEL RESECTION SIGMOID LAPAROSCOPIC ROBOTIC    COLECTOMY N/A 3/15/2023    BOWEL RESECTION SIGMOID LAPAROSCOPIC ROBOTIC performed by Malena Galloway MD at [facility] OR    COLONOSCOPY      COLONOSCOPY N/A 2022    COLONOSCOPY performed by Malena Galloway MD at [facility] ENDOSCOPY    TONSILLECTOMY      UPPER GASTROINTESTINAL ENDOSCOPY N/A 2024    ESOPHAGOGASTRODUODENOSCOPY BIOPSY performed by Rg Gonzalez MD at [facility] ENDOSCOPY    VENTRAL HERNIA REPAIR N/A 6/10/2024    HERNIA INCISIONAL REPAIR LAPAROSCOPIC ROBOTIC XI performed by Malena Galloway MD at [facility] OR    WISDOM TOOTH EXTRACTION         Immunization History:   Immunization History   Administered Date(s) Administered    COVID-19, PFIZER

## 2025-02-04 NOTE — ED PROVIDER NOTES
SCREENINGS        [place] Coma Scale  Eye Opening: Spontaneous  Best Verbal Response: Oriented  Best Motor Response: Obeys commands  [place] Coma Scale Score: 15                CIWA Assessment  BP: (!) 145/75  Pulse: 62           PHYSICAL EXAM  1 or more Elements     ED Triage Vitals [02/04/25 1525]   BP Systolic BP Percentile Diastolic BP Percentile Temp Temp Source Pulse Respirations SpO2   (!) 145/75 -- -- 97.7 °F (36.5 °C) Oral 62 18 96 %      Height Weight         -- --             Physical Exam  Vitals reviewed.   Constitutional:       Appearance: Normal appearance.   HENT:      Head: Normocephalic and atraumatic.      Mouth/Throat:      Mouth: Mucous membranes are moist.   Eyes:      Extraocular Movements: Extraocular movements intact.      Pupils: Pupils are equal, round, and reactive to light.   Cardiovascular:      Rate and Rhythm: Normal rate and regular rhythm.      Pulses: Normal pulses.      Heart sounds: Normal heart sounds.   Pulmonary:      Effort: Pulmonary effort is normal.      Breath sounds: Normal breath sounds.   Musculoskeletal:      Left shoulder: Tenderness present. No bony tenderness or crepitus. Decreased range of motion. Normal strength. Normal pulse.      Left upper arm: Normal.        Arms:       Comments: Tenderness with  palpation to the left shoulder there is full range of motion.  There is no weakness.  Sensations are intact.  No open wounds noted.   Skin:     General: Skin is warm and dry.      Capillary Refill: Capillary refill takes less than 2 seconds.   Neurological:      General: No focal deficit present.      Mental Status: He is alert and oriented to person, place, and time.   Psychiatric:         Mood and Affect: Mood normal.         Behavior: Behavior normal.             DIAGNOSTIC RESULTS   LABS:    Labs Reviewed - No data to display    When ordered only abnormal lab results are displayed. All other labs were within normal range or not returned as of this

## 2025-02-09 ENCOUNTER — APPOINTMENT (OUTPATIENT)
Dept: GENERAL RADIOLOGY | Age: 25
End: 2025-02-09
Payer: COMMERCIAL

## 2025-02-09 ENCOUNTER — HOSPITAL ENCOUNTER (EMERGENCY)
Age: 25
Discharge: HOME | End: 2025-02-09
Attending: EMERGENCY MEDICINE
Payer: COMMERCIAL

## 2025-02-09 VITALS
TEMPERATURE: 97.2 F | SYSTOLIC BLOOD PRESSURE: 129 MMHG | OXYGEN SATURATION: 100 % | HEART RATE: 87 BPM | RESPIRATION RATE: 20 BRPM | DIASTOLIC BLOOD PRESSURE: 67 MMHG

## 2025-02-09 DIAGNOSIS — J11.1 INFLUENZA WITH RESPIRATORY MANIFESTATION OTHER THAN PNEUMONIA: Primary | ICD-10-CM

## 2025-02-09 LAB
ALBUMIN SERPL-MCNC: 4.1 G/DL (ref 3.5–5.2)
ALP SERPL-CCNC: 134 U/L (ref 40–129)
ALT SERPL-CCNC: 34 U/L (ref 0–40)
ANION GAP SERPL CALCULATED.3IONS-SCNC: 14 MMOL/L (ref 7–16)
AST SERPL-CCNC: 35 U/L (ref 0–39)
BASOPHILS # BLD: 0.03 K/UL (ref 0–0.2)
BASOPHILS NFR BLD: 0 % (ref 0–2)
BILIRUB SERPL-MCNC: 0.2 MG/DL (ref 0–1.2)
BUN SERPL-MCNC: 14 MG/DL (ref 6–20)
CALCIUM SERPL-MCNC: 9 MG/DL (ref 8.6–10.2)
CHLORIDE SERPL-SCNC: 102 MMOL/L (ref 98–107)
CO2 SERPL-SCNC: 22 MMOL/L (ref 22–29)
CREAT SERPL-MCNC: 0.8 MG/DL (ref 0.7–1.2)
D-DIMER QUANTITATIVE: <200 NG/ML DDU (ref 0–230)
EOSINOPHIL # BLD: 0.12 K/UL (ref 0.05–0.5)
EOSINOPHILS RELATIVE PERCENT: 1 % (ref 0–6)
ERYTHROCYTE [DISTWIDTH] IN BLOOD BY AUTOMATED COUNT: 13.3 % (ref 11.5–15)
GFR, ESTIMATED: >90 ML/MIN/1.73M2
GLUCOSE SERPL-MCNC: 77 MG/DL (ref 74–99)
HCT VFR BLD AUTO: 47.5 % (ref 37–54)
HETEROPH AB BLD QL IA: NEGATIVE
HGB BLD-MCNC: 15.5 G/DL (ref 12.5–16.5)
IMM GRANULOCYTES # BLD AUTO: 0.07 K/UL (ref 0–0.58)
IMM GRANULOCYTES NFR BLD: 1 % (ref 0–5)
INFLUENZA A BY PCR: DETECTED
INFLUENZA B BY PCR: NOT DETECTED
LYMPHOCYTES NFR BLD: 2.54 K/UL (ref 1.5–4)
LYMPHOCYTES RELATIVE PERCENT: 28 % (ref 20–42)
MCH RBC QN AUTO: 29.1 PG (ref 26–35)
MCHC RBC AUTO-ENTMCNC: 32.6 G/DL (ref 32–34.5)
MCV RBC AUTO: 89.1 FL (ref 80–99.9)
MONOCYTES NFR BLD: 0.76 K/UL (ref 0.1–0.95)
MONOCYTES NFR BLD: 8 % (ref 2–12)
NEUTROPHILS NFR BLD: 62 % (ref 43–80)
NEUTS SEG NFR BLD: 5.69 K/UL (ref 1.8–7.3)
PLATELET # BLD AUTO: 320 K/UL (ref 130–450)
PMV BLD AUTO: 9.5 FL (ref 7–12)
POTASSIUM SERPL-SCNC: 3.8 MMOL/L (ref 3.5–5)
PROT SERPL-MCNC: 8 G/DL (ref 6.4–8.3)
RBC # BLD AUTO: 5.33 M/UL (ref 3.8–5.8)
SARS-COV-2 RDRP RESP QL NAA+PROBE: NOT DETECTED
SODIUM SERPL-SCNC: 138 MMOL/L (ref 132–146)
SPECIMEN DESCRIPTION: NORMAL
TROPONIN I SERPL HS-MCNC: 8 NG/L (ref 0–11)
WBC OTHER # BLD: 9.2 K/UL (ref 4.5–11.5)

## 2025-02-09 PROCEDURE — 87502 INFLUENZA DNA AMP PROBE: CPT

## 2025-02-09 PROCEDURE — 99285 EMERGENCY DEPT VISIT HI MDM: CPT

## 2025-02-09 PROCEDURE — 93005 ELECTROCARDIOGRAM TRACING: CPT

## 2025-02-09 PROCEDURE — 80053 COMPREHEN METABOLIC PANEL: CPT

## 2025-02-09 PROCEDURE — 86308 HETEROPHILE ANTIBODY SCREEN: CPT

## 2025-02-09 PROCEDURE — 6370000000 HC RX 637 (ALT 250 FOR IP): Performed by: EMERGENCY MEDICINE

## 2025-02-09 PROCEDURE — 84484 ASSAY OF TROPONIN QUANT: CPT

## 2025-02-09 PROCEDURE — 85379 FIBRIN DEGRADATION QUANT: CPT

## 2025-02-09 PROCEDURE — 71046 X-RAY EXAM CHEST 2 VIEWS: CPT

## 2025-02-09 PROCEDURE — 85025 COMPLETE CBC W/AUTO DIFF WBC: CPT

## 2025-02-09 PROCEDURE — 87635 SARS-COV-2 COVID-19 AMP PRB: CPT

## 2025-02-09 RX ORDER — OSELTAMIVIR PHOSPHATE 75 MG/1
75 CAPSULE ORAL 2 TIMES DAILY
Qty: 10 CAPSULE | Refills: 0 | Status: SHIPPED | OUTPATIENT
Start: 2025-02-09 | End: 2025-02-14

## 2025-02-09 RX ORDER — GUAIFENESIN AND DEXTROMETHORPHAN HYDROBROMIDE 1200; 60 MG/1; MG/1
1 TABLET, EXTENDED RELEASE ORAL EVERY 12 HOURS PRN
Qty: 28 TABLET | Refills: 0 | Status: SHIPPED | OUTPATIENT
Start: 2025-02-09

## 2025-02-09 RX ORDER — OSELTAMIVIR PHOSPHATE 75 MG/1
75 CAPSULE ORAL ONCE
Status: COMPLETED | OUTPATIENT
Start: 2025-02-09 | End: 2025-02-09

## 2025-02-09 RX ORDER — ALBUTEROL SULFATE 90 UG/1
2 INHALANT RESPIRATORY (INHALATION) 4 TIMES DAILY PRN
Qty: 18 G | Refills: 0 | Status: SHIPPED | OUTPATIENT
Start: 2025-02-09

## 2025-02-09 RX ADMIN — OSELTAMIVIR PHOSPHATE 75 MG: 75 CAPSULE ORAL at 19:46

## 2025-02-09 NOTE — ED PROVIDER NOTES
D-DIMER, QUANTITATIVE       When ordered only abnormal lab results are displayed. All other labs were within normal range or not returned as of this dictation.    EK  ECG read by me  Normal sinus rhythm  Heart rate 82  QTc 441 no acute ischemia    RADIOLOGY:   Non-plain film images such as CT, Ultrasound and MRI are read by the radiologist. Plain radiographic images are visualized and preliminarily interpreted by the ED Provider with the below findings:    No pneumonia on chf    Interpretation per the Radiologist below, if available at the time of this note:    XR CHEST (2 VW)   Final Result   No acute process.           XR SHOULDER LEFT (MIN 2 VIEWS)    Result Date: 2/3/2025  EXAMINATION: THREE XRAY VIEWS OF THE LEFT SHOULDER 2/3/2025 6:44 pm COMPARISON: None. HISTORY: ORDERING SYSTEM PROVIDED HISTORY: Shoulder injury TECHNOLOGIST PROVIDED HISTORY: Reason for exam:->Shoulder injury FINDINGS: The shoulder is intact, with no sign of fracture or dislocation. There is no sign of any degenerative disease. The visualized chest is clear.     Normal study.       No results found.    PROCEDURES   Unless otherwise noted below, none     Procedures    CRITICAL CARE TIME   none    PAST MEDICAL HISTORY      has a past medical history of Arthritis, Asperger syndrome, Asthma, Hypertension, Obesity, Sigmoid diverticulitis, and Sleep apnea.     EMERGENCY DEPARTMENT COURSE and DIFFERENTIAL DIAGNOSIS/MDM:   Vitals:    Vitals:    25 1622 25 1915   BP: 118/68 129/67   Pulse: 99 87   Resp: 20    Temp: 97.2 °F (36.2 °C)    SpO2: 99% 100%       Patient was given the following medications:  Medications   oseltamivir (TAMIFLU) capsule 75 mg (75 mg Oral Given 25)             [unfilled]    Chronic Conditions:   Active Ambulatory Problems     Diagnosis Date Noted    Other sprain of right middle finger, initial encounter 2019    Tendinitis 2019    Pain of finger of right hand 2019

## 2025-02-09 NOTE — DISCHARGE INSTRUCTIONS
Call family doctor tomorrow and schedule a followup appointment to be seen in 2 days    Have your doctor obtain  finalized report of all results    \  XR CHEST (2 VW)   Final Result   No acute process.           Results for orders placed or performed during the hospital encounter of 02/09/25   COVID-19, Rapid    Specimen: Nasopharyngeal Swab   Result Value Ref Range    Specimen Description .NASOPHARYNGEAL SWAB     SARS-CoV-2, Rapid Not Detected Not Detected   Influenza A+B, PCR    Specimen: Throat   Result Value Ref Range    Influenza A by PCR DETECTED (A) Not Detected    Influenza B by PCR Not Detected Not Detected   CBC with Auto Differential   Result Value Ref Range    WBC 9.2 4.5 - 11.5 k/uL    RBC 5.33 3.80 - 5.80 m/uL    Hemoglobin 15.5 12.5 - 16.5 g/dL    Hematocrit 47.5 37.0 - 54.0 %    MCV 89.1 80.0 - 99.9 fL    MCH 29.1 26.0 - 35.0 pg    MCHC 32.6 32.0 - 34.5 g/dL    RDW 13.3 11.5 - 15.0 %    Platelets 320 130 - 450 k/uL    MPV 9.5 7.0 - 12.0 fL    Neutrophils % 62 43.0 - 80.0 %    Lymphocytes % 28 20.0 - 42.0 %    Monocytes % 8 2.0 - 12.0 %    Eosinophils % 1 0 - 6 %    Basophils % 0 0.0 - 2.0 %    Immature Granulocytes % 1 0.0 - 5.0 %    Neutrophils Absolute 5.69 1.80 - 7.30 k/uL    Lymphocytes Absolute 2.54 1.50 - 4.00 k/uL    Monocytes Absolute 0.76 0.10 - 0.95 k/uL    Eosinophils Absolute 0.12 0.05 - 0.50 k/uL    Basophils Absolute 0.03 0.00 - 0.20 k/uL    Immature Granulocytes Absolute 0.07 0.00 - 0.58 k/uL   CMP   Result Value Ref Range    Sodium 138 132 - 146 mmol/L    Potassium 3.8 3.5 - 5.0 mmol/L    Chloride 102 98 - 107 mmol/L    CO2 22 22 - 29 mmol/L    Anion Gap 14 7 - 16 mmol/L    Glucose 77 74 - 99 mg/dL    BUN 14 6 - 20 mg/dL    Creatinine 0.8 0.70 - 1.20 mg/dL    Est, Glom Filt Rate >90 >60 mL/min/1.73m2    Calcium 9.0 8.6 - 10.2 mg/dL    Total Protein 8.0 6.4 - 8.3 g/dL    Albumin 4.1 3.5 - 5.2 g/dL    Total Bilirubin 0.2 0.0 - 1.2 mg/dL    Alkaline Phosphatase 134 (H) 40 - 129 U/L

## 2025-02-10 LAB
EKG ATRIAL RATE: 82 BPM
EKG P AXIS: 19 DEGREES
EKG P-R INTERVAL: 150 MS
EKG Q-T INTERVAL: 378 MS
EKG QRS DURATION: 78 MS
EKG QTC CALCULATION (BAZETT): 441 MS
EKG R AXIS: 75 DEGREES
EKG T AXIS: 18 DEGREES
EKG VENTRICULAR RATE: 82 BPM

## 2025-02-14 ENCOUNTER — TELEPHONE (OUTPATIENT)
Dept: BARIATRICS/WEIGHT MGMT | Age: 25
End: 2025-02-14

## 2025-02-14 NOTE — TELEPHONE ENCOUNTER
Vinod was scheduled for his 2nd dietary appt today with Wilma. Wilma was sick today and did not come in so we had moved the appt to Dean's schedule. Vinod did not show for his appt. I called him, but he did not answer. I LM to please call our office to reschedule with Wilma.

## 2025-07-06 ENCOUNTER — APPOINTMENT (OUTPATIENT)
Dept: GENERAL RADIOLOGY | Age: 25
End: 2025-07-06
Payer: COMMERCIAL

## 2025-07-06 ENCOUNTER — HOSPITAL ENCOUNTER (EMERGENCY)
Age: 25
Discharge: HOME | End: 2025-07-06
Payer: COMMERCIAL

## 2025-07-06 VITALS
DIASTOLIC BLOOD PRESSURE: 70 MMHG | SYSTOLIC BLOOD PRESSURE: 116 MMHG | TEMPERATURE: 98.4 F | HEART RATE: 86 BPM | WEIGHT: 315 LBS | RESPIRATION RATE: 19 BRPM | OXYGEN SATURATION: 100 % | BODY MASS INDEX: 54.82 KG/M2

## 2025-07-06 DIAGNOSIS — S39.012A STRAIN OF LUMBAR REGION, INITIAL ENCOUNTER: Primary | ICD-10-CM

## 2025-07-06 PROCEDURE — 99211 OFF/OP EST MAY X REQ PHY/QHP: CPT

## 2025-07-06 PROCEDURE — 72100 X-RAY EXAM L-S SPINE 2/3 VWS: CPT

## 2025-07-06 PROCEDURE — 72072 X-RAY EXAM THORAC SPINE 3VWS: CPT

## 2025-07-06 PROCEDURE — 96372 THER/PROPH/DIAG INJ SC/IM: CPT

## 2025-07-06 PROCEDURE — 6360000002 HC RX W HCPCS: Performed by: NURSE PRACTITIONER

## 2025-07-06 RX ORDER — KETOROLAC TROMETHAMINE 30 MG/ML
30 INJECTION, SOLUTION INTRAMUSCULAR; INTRAVENOUS ONCE
Status: COMPLETED | OUTPATIENT
Start: 2025-07-06 | End: 2025-07-06

## 2025-07-06 RX ORDER — METHYLPREDNISOLONE 4 MG/1
TABLET ORAL
Qty: 1 KIT | Refills: 0 | Status: SHIPPED | OUTPATIENT
Start: 2025-07-06 | End: 2025-07-12

## 2025-07-06 RX ORDER — METHOCARBAMOL 750 MG/1
750 TABLET, FILM COATED ORAL 4 TIMES DAILY
Qty: 40 TABLET | Refills: 0 | Status: SHIPPED | OUTPATIENT
Start: 2025-07-06 | End: 2025-07-16

## 2025-07-06 RX ADMIN — KETOROLAC TROMETHAMINE 30 MG: 30 INJECTION, SOLUTION INTRAMUSCULAR at 13:43

## 2025-07-06 NOTE — ED PROVIDER NOTES
Independent LAURA Visit.   HPI:  7/6/25,   Time: 1:10 PM EDT         Vinod Rodriguez Jr. is a 25 y.o. male presenting to the urgent care for low back pain patient states that he works for "[employer] and on Thursday, July 3, 2025 patient states that he tripped over a cart he had a box in his hands and when he fell over the cart he hit the ground but he did not lose consciousness he does not remember if he fell on his front or backside but he had low back pain starting in the middle of his back going down the back on primarily the left side.  He states that for the last 3 days he has been icing it and taking 200 mg of ibuprofen twice a day he went to work today and was supposed to work 7-3 but could not make it beyond 7-12 due to the back pain.  He is denying any saddle anesthesia, he is denying abdominal pain, he is denying fever, he is denying weakness of his lower legs, he is denying radiculopathy to the lower legs, he is having no sciatic nerve pain.  Moving makes it worse, laying going from lying to sitting, and certain movements depending on how he turns worsens the pain.  There is no bruising or swelling.  Patient has no vertebral or intervertebral tenderness.  He has no bowel or bladder incontinence or bowel or bladder dysfunction.,ROS:   Pertinent positives and negatives are stated within HPI, all other systems reviewed and are negative.  --------------------------------------------- PAST HISTORY ---------------------------------------------  Past Medical History:  has a past medical history of Arthritis, Asperger syndrome, Asthma, Hypertension, Obesity, Sigmoid diverticulitis, and Sleep apnea.    Past Surgical History:  has a past surgical history that includes Tonsillectomy; Adenoidectomy; [place] tooth extraction; Colonoscopy; Colonoscopy (N/A, 07/29/2022); Bowel resection (03/15/2023); colectomy (N/A, 3/15/2023); ventral hernia repair (N/A, 6/10/2024); and Upper gastrointestinal endoscopy (N/A,

## 2025-07-28 ENCOUNTER — HOSPITAL ENCOUNTER (EMERGENCY)
Age: 25
Discharge: HOME | End: 2025-07-28
Attending: STUDENT IN AN ORGANIZED HEALTH CARE EDUCATION/TRAINING PROGRAM

## 2025-07-28 ENCOUNTER — APPOINTMENT (OUTPATIENT)
Dept: CT IMAGING | Age: 25
End: 2025-07-28

## 2025-07-28 ENCOUNTER — APPOINTMENT (OUTPATIENT)
Dept: ULTRASOUND IMAGING | Age: 25
End: 2025-07-28

## 2025-07-28 VITALS
HEART RATE: 100 BPM | OXYGEN SATURATION: 97 % | SYSTOLIC BLOOD PRESSURE: 120 MMHG | TEMPERATURE: 97.6 F | DIASTOLIC BLOOD PRESSURE: 77 MMHG | RESPIRATION RATE: 18 BRPM

## 2025-07-28 DIAGNOSIS — R11.2 NAUSEA AND VOMITING, UNSPECIFIED VOMITING TYPE: Primary | ICD-10-CM

## 2025-07-28 DIAGNOSIS — R10.10 PAIN OF UPPER ABDOMEN: ICD-10-CM

## 2025-07-28 LAB
ALBUMIN SERPL-MCNC: 4.1 G/DL (ref 3.5–5.2)
ALP SERPL-CCNC: 126 U/L (ref 40–129)
ALT SERPL-CCNC: 26 U/L (ref 0–50)
ANION GAP SERPL CALCULATED.3IONS-SCNC: 12 MMOL/L (ref 7–16)
AST SERPL-CCNC: 29 U/L (ref 0–50)
BASOPHILS # BLD: 0.04 K/UL (ref 0–0.2)
BASOPHILS NFR BLD: 0 % (ref 0–2)
BILIRUB SERPL-MCNC: 0.3 MG/DL (ref 0–1.2)
BILIRUB UR QL STRIP: NEGATIVE
BUN SERPL-MCNC: 11 MG/DL (ref 6–20)
CALCIUM SERPL-MCNC: 9.5 MG/DL (ref 8.6–10)
CHLORIDE SERPL-SCNC: 101 MMOL/L (ref 98–107)
CLARITY UR: CLEAR
CO2 SERPL-SCNC: 26 MMOL/L (ref 22–29)
COLOR UR: YELLOW
CREAT SERPL-MCNC: 0.7 MG/DL (ref 0.7–1.2)
EOSINOPHIL # BLD: 0.07 K/UL (ref 0.05–0.5)
EOSINOPHILS RELATIVE PERCENT: 1 % (ref 0–6)
ERYTHROCYTE [DISTWIDTH] IN BLOOD BY AUTOMATED COUNT: 13 % (ref 11.5–15)
GFR, ESTIMATED: >90 ML/MIN/1.73M2
GLUCOSE SERPL-MCNC: 98 MG/DL (ref 74–99)
GLUCOSE UR STRIP-MCNC: NEGATIVE MG/DL
HCT VFR BLD AUTO: 46 % (ref 37–54)
HGB BLD-MCNC: 15.6 G/DL (ref 12.5–16.5)
HGB UR QL STRIP.AUTO: NEGATIVE
IMM GRANULOCYTES # BLD AUTO: 0.13 K/UL (ref 0–0.58)
IMM GRANULOCYTES NFR BLD: 1 % (ref 0–5)
KETONES UR STRIP-MCNC: NEGATIVE MG/DL
LACTATE BLDV-SCNC: 1.4 MMOL/L (ref 0.5–2.2)
LEUKOCYTE ESTERASE UR QL STRIP: NEGATIVE
LIPASE SERPL-CCNC: 20 U/L (ref 13–60)
LYMPHOCYTES NFR BLD: 2.13 K/UL (ref 1.5–4)
LYMPHOCYTES RELATIVE PERCENT: 19 % (ref 20–42)
MAGNESIUM SERPL-MCNC: 2.2 MG/DL (ref 1.6–2.6)
MCH RBC QN AUTO: 29.2 PG (ref 26–35)
MCHC RBC AUTO-ENTMCNC: 33.9 G/DL (ref 32–34.5)
MCV RBC AUTO: 86.1 FL (ref 80–99.9)
MONOCYTES NFR BLD: 0.7 K/UL (ref 0.1–0.95)
MONOCYTES NFR BLD: 6 % (ref 2–12)
NEUTROPHILS NFR BLD: 73 % (ref 43–80)
NEUTS SEG NFR BLD: 8.41 K/UL (ref 1.8–7.3)
NITRITE UR QL STRIP: NEGATIVE
PH UR STRIP: 6 [PH] (ref 5–8)
PLATELET # BLD AUTO: 301 K/UL (ref 130–450)
PMV BLD AUTO: 10.3 FL (ref 7–12)
POTASSIUM SERPL-SCNC: 3.8 MMOL/L (ref 3.5–5.1)
PROT SERPL-MCNC: 7.4 G/DL (ref 6.4–8.3)
PROT UR STRIP-MCNC: NEGATIVE MG/DL
RBC # BLD AUTO: 5.34 M/UL (ref 3.8–5.8)
RBC #/AREA URNS HPF: NORMAL /HPF
SODIUM SERPL-SCNC: 138 MMOL/L (ref 136–145)
SP GR UR STRIP: 1.02 (ref 1–1.03)
UROBILINOGEN UR STRIP-ACNC: 0.2 EU/DL (ref 0–1)
WBC #/AREA URNS HPF: NORMAL /HPF
WBC OTHER # BLD: 11.5 K/UL (ref 4.5–11.5)

## 2025-07-28 PROCEDURE — 81001 URINALYSIS AUTO W/SCOPE: CPT

## 2025-07-28 PROCEDURE — 6360000004 HC RX CONTRAST MEDICATION: Performed by: RADIOLOGY

## 2025-07-28 PROCEDURE — 96375 TX/PRO/DX INJ NEW DRUG ADDON: CPT

## 2025-07-28 PROCEDURE — 2500000003 HC RX 250 WO HCPCS: Performed by: NURSE PRACTITIONER

## 2025-07-28 PROCEDURE — 83605 ASSAY OF LACTIC ACID: CPT

## 2025-07-28 PROCEDURE — 76705 ECHO EXAM OF ABDOMEN: CPT

## 2025-07-28 PROCEDURE — 87086 URINE CULTURE/COLONY COUNT: CPT

## 2025-07-28 PROCEDURE — 80053 COMPREHEN METABOLIC PANEL: CPT

## 2025-07-28 PROCEDURE — 83690 ASSAY OF LIPASE: CPT

## 2025-07-28 PROCEDURE — 96361 HYDRATE IV INFUSION ADD-ON: CPT

## 2025-07-28 PROCEDURE — 74177 CT ABD & PELVIS W/CONTRAST: CPT

## 2025-07-28 PROCEDURE — 2580000003 HC RX 258: Performed by: NURSE PRACTITIONER

## 2025-07-28 PROCEDURE — 85025 COMPLETE CBC W/AUTO DIFF WBC: CPT

## 2025-07-28 PROCEDURE — 83735 ASSAY OF MAGNESIUM: CPT

## 2025-07-28 PROCEDURE — 99285 EMERGENCY DEPT VISIT HI MDM: CPT

## 2025-07-28 PROCEDURE — 96374 THER/PROPH/DIAG INJ IV PUSH: CPT

## 2025-07-28 PROCEDURE — 6360000002 HC RX W HCPCS: Performed by: NURSE PRACTITIONER

## 2025-07-28 PROCEDURE — 87077 CULTURE AEROBIC IDENTIFY: CPT

## 2025-07-28 RX ORDER — KETOROLAC TROMETHAMINE 30 MG/ML
30 INJECTION, SOLUTION INTRAMUSCULAR; INTRAVENOUS ONCE
Status: COMPLETED | OUTPATIENT
Start: 2025-07-28 | End: 2025-07-28

## 2025-07-28 RX ORDER — FAMOTIDINE 20 MG/1
20 TABLET, FILM COATED ORAL 2 TIMES DAILY
Qty: 60 TABLET | Refills: 0 | Status: SHIPPED | OUTPATIENT
Start: 2025-07-28

## 2025-07-28 RX ORDER — ONDANSETRON 4 MG/1
4 TABLET, ORALLY DISINTEGRATING ORAL 3 TIMES DAILY PRN
Qty: 21 TABLET | Refills: 0 | Status: SHIPPED | OUTPATIENT
Start: 2025-07-28

## 2025-07-28 RX ORDER — ONDANSETRON 2 MG/ML
4 INJECTION INTRAMUSCULAR; INTRAVENOUS ONCE
Status: COMPLETED | OUTPATIENT
Start: 2025-07-28 | End: 2025-07-28

## 2025-07-28 RX ORDER — IOPAMIDOL 755 MG/ML
75 INJECTION, SOLUTION INTRAVASCULAR
Status: COMPLETED | OUTPATIENT
Start: 2025-07-28 | End: 2025-07-28

## 2025-07-28 RX ORDER — MORPHINE SULFATE 4 MG/ML
4 INJECTION, SOLUTION INTRAMUSCULAR; INTRAVENOUS ONCE
Status: COMPLETED | OUTPATIENT
Start: 2025-07-28 | End: 2025-07-28

## 2025-07-28 RX ORDER — 0.9 % SODIUM CHLORIDE 0.9 %
1000 INTRAVENOUS SOLUTION INTRAVENOUS ONCE
Status: COMPLETED | OUTPATIENT
Start: 2025-07-28 | End: 2025-07-28

## 2025-07-28 RX ADMIN — SODIUM CHLORIDE 1000 ML: 0.9 INJECTION, SOLUTION INTRAVENOUS at 12:22

## 2025-07-28 RX ADMIN — FAMOTIDINE 20 MG: 10 INJECTION, SOLUTION INTRAVENOUS at 12:23

## 2025-07-28 RX ADMIN — MORPHINE SULFATE 4 MG: 4 INJECTION, SOLUTION INTRAMUSCULAR; INTRAVENOUS at 12:24

## 2025-07-28 RX ADMIN — ONDANSETRON 4 MG: 2 INJECTION, SOLUTION INTRAMUSCULAR; INTRAVENOUS at 12:23

## 2025-07-28 RX ADMIN — IOPAMIDOL 75 ML: 755 INJECTION, SOLUTION INTRAVENOUS at 14:02

## 2025-07-28 RX ADMIN — KETOROLAC TROMETHAMINE 30 MG: 30 INJECTION, SOLUTION INTRAMUSCULAR at 12:23

## 2025-07-28 ASSESSMENT — PAIN DESCRIPTION - ORIENTATION
ORIENTATION: MID

## 2025-07-28 ASSESSMENT — PAIN DESCRIPTION - ONSET: ONSET: ON-GOING

## 2025-07-28 ASSESSMENT — PAIN SCALES - GENERAL
PAINLEVEL_OUTOF10: 7
PAINLEVEL_OUTOF10: 10
PAINLEVEL_OUTOF10: 7
PAINLEVEL_OUTOF10: 3

## 2025-07-28 ASSESSMENT — PAIN DESCRIPTION - DESCRIPTORS
DESCRIPTORS: CRAMPING
DESCRIPTORS: CRAMPING
DESCRIPTORS: ACHING;CRAMPING;DISCOMFORT
DESCRIPTORS: DISCOMFORT

## 2025-07-28 ASSESSMENT — PAIN DESCRIPTION - LOCATION
LOCATION: ABDOMEN

## 2025-07-28 ASSESSMENT — PAIN DESCRIPTION - PAIN TYPE: TYPE: ACUTE PAIN

## 2025-07-28 ASSESSMENT — PAIN DESCRIPTION - FREQUENCY: FREQUENCY: CONTINUOUS

## 2025-07-28 ASSESSMENT — PAIN - FUNCTIONAL ASSESSMENT: PAIN_FUNCTIONAL_ASSESSMENT: 0-10

## 2025-07-28 NOTE — ED PROVIDER NOTES
[facility] EMERGENCY DEPARTMENT  EMERGENCY DEPARTMENT ENCOUNTER      Pt Name: Vinod Rodriguez Jr.  MRN: [id number]  Birthdate 2000  Date of evaluation: 7/28/2025  Provider: Gato De Jesus DO  PCP: Mikayla Coulter APN  Note Started: 12:30 PM EDT 7/28/25    CHIEF COMPLAINT       Chief Complaint   Patient presents with    Vomiting     Started to vomit today while at work, had abd cramps yesterday       HISTORY OF PRESENT ILLNESS: 1 or more Elements   History From: Patient  Limitations to history : None    Vinod Rodriguez Jr. is a 25 y.o. male Past medical history of diverticulitis, hypertension as well as incisional hernia.  Patient presents with chief complaint of nausea vomiting as well as abdominal pain.  Patient notes that for the last days and wants episodes of nausea with nonbilious nonbloody vomiting.  He states that pain is located left upper quadrant.  Patient also endorses some moderate pain.  Patient currently rates pain a 7 out of 10.  Patient denies any fevers, chills, chest pain, cough, abdominal pain, constipation or diarrhea.    Nursing Notes were all reviewed and agreed with or any disagreements were addressed in the HPI.    REVIEW OF SYSTEMS :    Positives and Pertinent negatives as per HPI.     PAST MEDICAL HISTORY/Chronic Conditions Affecting Care    has a past medical history of Arthritis, Asperger syndrome, Asthma, Hypertension, Obesity, Sigmoid diverticulitis, and Sleep apnea.     SURGICAL HISTORY     Past Surgical History:   Procedure Laterality Date    ADENOIDECTOMY      BOWEL RESECTION  03/15/2023    BOWEL RESECTION SIGMOID LAPAROSCOPIC ROBOTIC    COLECTOMY N/A 3/15/2023    BOWEL RESECTION SIGMOID LAPAROSCOPIC ROBOTIC performed by Malena Galloway MD at [facility] OR    COLONOSCOPY      COLONOSCOPY N/A 07/29/2022    COLONOSCOPY performed by Malena Galloway MD at [facility] ENDOSCOPY    TONSILLECTOMY      UPPER GASTROINTESTINAL ENDOSCOPY N/A 12/30/2024

## 2025-07-28 NOTE — DISCHARGE INSTRUCTIONS
CT ABDOMEN PELVIS W IV CONTRAST Additional Contrast? None   Final Result   1. No evidence of an acute intra-abdominal or pelvic process.   2. Solitary punctate nonobstructive calculus in the right kidney.   3. Diverticulosis of the descending and sigmoid colon but no evidence of   diverticulitis.         US GALLBLADDER RUQ   Final Result   1. Mild hepatomegaly with diffuse fatty infiltration of the liver.   2. Gallbladder sludge with mild gallbladder wall thickening. No evidence of   pericholecystic fluid or stones. Negative sonographic Acevedo's sign.           Return if any new or worsening symptoms.  Return if any chest pain or shortness of breath.  Follow-up with your primary care provider.

## 2025-07-28 NOTE — ED TRIAGE NOTES
Department of Emergency Medicine  FIRST PROVIDER TRIAGE NOTE             Independent MLP           7/28/25  11:35 AM EDT    Date of Encounter: 7/28/25   MRN: [id number]      HPI: Vinod Rodriguez Jr. is a 25 y.o. male who presents to the ED for Vomiting (Started to vomit today while at work, had abd cramps yesterday)       ROS: Negative for cp, sob, cough, diarrhea, or urinary complaints.    PE: Gen Appearance/Constitutional: alert  HEENT: NC/NT. PERRLA,  Airway patent.  Neck: supple  CV: regular rate  Pulm: CTA bilat  GI: soft and NT  Musculoskeletal: moves all extremities x 4  Lymphatics: no edema     Initial Plan of Care: All treatment areas with department are currently occupied. Plan to order/Initiate the following while awaiting opening in ED: labs, imaging studies, analgesics, antiemetics, and IV fluids.  Initiate Treatment-Testing, Proceed toTreatment Area When Bed Available for ED Attending/MLP to Continue Care    Electronically signed by CATIE Ramos - MAMADOU   DD: 7/28/25

## 2025-07-29 ENCOUNTER — TELEPHONE (OUTPATIENT)
Dept: SURGERY | Age: 25
End: 2025-07-29

## 2025-07-29 ENCOUNTER — OFFICE VISIT (OUTPATIENT)
Dept: SURGERY | Age: 25
End: 2025-07-29

## 2025-07-29 VITALS
TEMPERATURE: 101 F | DIASTOLIC BLOOD PRESSURE: 68 MMHG | WEIGHT: 315 LBS | BODY MASS INDEX: 49.44 KG/M2 | SYSTOLIC BLOOD PRESSURE: 124 MMHG | HEART RATE: 91 BPM | HEIGHT: 67 IN

## 2025-07-29 DIAGNOSIS — K80.20 SYMPTOMATIC CHOLELITHIASIS: ICD-10-CM

## 2025-07-29 DIAGNOSIS — K82.8 GALLBLADDER SLUDGE: Primary | ICD-10-CM

## 2025-07-29 PROCEDURE — 99213 OFFICE O/P EST LOW 20 MIN: CPT | Performed by: SURGERY

## 2025-07-29 PROCEDURE — 3078F DIAST BP <80 MM HG: CPT | Performed by: SURGERY

## 2025-07-29 PROCEDURE — 3074F SYST BP LT 130 MM HG: CPT | Performed by: SURGERY

## 2025-07-29 NOTE — PROGRESS NOTES
General Surgery History and Physical  [facility]    Patient's Name/Date of Birth: Vinod Rodriguez Jr. / 2000    Date: July 29, 2025     Surgeon: Malena Galloway MD    PCP: Mikayla Coulter APN     Chief Complaint: Right upper quadrant pain    HPI:   Vinod Rodriguez Jr. is a 25 y.o. male who presents for evaluation of right upper quadrant pain. Timing is intermittent, radiation to back, alleviated by npo and started several weeks ago. Denies SOB, chest pain, fever, chills, diarrhea, constipation. They have been seen by medical and emergency care professionals for the same complaints and referred for surgical evaluation.     Past Medical History:   Diagnosis Date    Arthritis     Asperger syndrome     Asthma     Hypertension     Obesity     Sigmoid diverticulitis     Sleep apnea        Past Surgical History:   Procedure Laterality Date    ADENOIDECTOMY      BOWEL RESECTION  03/15/2023    BOWEL RESECTION SIGMOID LAPAROSCOPIC ROBOTIC    COLECTOMY N/A 3/15/2023    BOWEL RESECTION SIGMOID LAPAROSCOPIC ROBOTIC performed by Malena Galloway MD at [facility] OR    COLONOSCOPY      COLONOSCOPY N/A 07/29/2022    COLONOSCOPY performed by Malena Galloway MD at [facility] ENDOSCOPY    TONSILLECTOMY      UPPER GASTROINTESTINAL ENDOSCOPY N/A 12/30/2024    ESOPHAGOGASTRODUODENOSCOPY BIOPSY performed by Rg Gonzalez MD at [facility] ENDOSCOPY    VENTRAL HERNIA REPAIR N/A 6/10/2024    HERNIA INCISIONAL REPAIR LAPAROSCOPIC ROBOTIC XI performed by Malena Galloway MD at [facility] OR    WISDOM TOOTH EXTRACTION         Current Outpatient Medications   Medication Sig Dispense Refill    famotidine (PEPCID) 20 MG tablet Take 1 tablet by mouth 2 times daily 60 tablet 0    ondansetron (ZOFRAN-ODT) 4 MG disintegrating tablet Take 1 tablet by mouth 3 times daily as needed for Nausea or Vomiting 21 tablet 0    Dextromethorphan-guaiFENesin (MUCINEX DM MAXIMUM STRENGTH)  MG TB12 Take 1 tablet by mouth every 12 hours as needed

## 2025-07-29 NOTE — TELEPHONE ENCOUNTER
Per the order of Dr. Galloway, patient has been scheduled for Laparoscopic robotic cholecystectomy with cholangiogram on 8.1.2025.  Patient provided with procedure information and scheduled for post op follow up appointment.  Patient instructed to please contact our office with any questions.    Procedure scheduled through [facility].  Dr. Galloway to enter orders.    Electronically signed by Jazmin Keller on 7/29/25 at 8:22 AM EDT

## 2025-07-30 LAB
MICROORGANISM SPEC CULT: ABNORMAL
SPECIMEN DESCRIPTION: ABNORMAL

## 2025-07-31 NOTE — PROGRESS NOTES
[facility]                                                                                                                    PRE OP INSTRUCTIONS FOR  Vinod Rodriguez .        Date: 7/31/2025    Date of surgery: 8/1/25   Arrival Time: Hospital will call you between 5pm and 7pm with your final arrival time for surgery. Go to front of hospital and check in at information desk.    Nothing by mouth (NPO) as instructed. May have clear liquids up to 2 hours prior to surgery. Nothing solid after midnight. Examples: water, apple juice, black coffee, plain tea    Take the following medications with a small sip of water on the morning of Surgery: topamax, inhaler prn     Diabetics may take half the evening dose of insulin but none after midnight.  If you feel symptomatic or have low blood sugar morning of surgery drink 1-2 ounces of apple juice only. If you take a weekly insulin injection _______________, stop 7 days prior to surgery. If you take _______________, stop 3-4 days prior to surgery.    Aspirin, Ibuprofen, Advil, Naproxen, other Anti-inflammatory products should be stopped before surgery as directed by your surgeon, cardiologist, or primary care Doctor. Herbal supplements and Vitamin E should be stopped five days prior.  May take Tylenol unless instructed otherwise by your surgeon.    Check with your Doctor regarding stopping Plavix, Coumadin, Lovenox, Eliquis, Effient, or other blood thinners such as, pradaxa, lixiana, xaralto and savaysa.    Do not smoke, chew tobacco, vape, or use illicit drugs and do not drink any alcoholic beverages 24 hours prior to surgery.    You may brush your teeth the morning of surgery.      You MUST make arrangements for a responsible adult, 18 and over, to take you home after your surgery. You will not be allowed to leave alone or drive yourself home.  You will need someone stay with you the first 24 hrs. Your surgery will be cancelled if you do not have a

## 2025-07-31 NOTE — DISCHARGE INSTRUCTIONS
POST-OPERATIVE INSTRUCTIONS FOR GALLBLADDER SURGERY    Call the office at  to schedule your post-operative appointment with Dr. Galloway for two (2) weeks.     You will have surgical glue closing your incisions, you may shower 24hours after your surgery but do not rub off glue, it will come off on its own over time.    Do not bathe for 3 days after your surgery, shower only and pat incisions dry after shower.    General guidelines for activity:   Avoid strenuous activity or lifting anything heavier than 15 pounds for 4 weeks.    It is OK to be up, walking around, and walking up and down stairs.     Do what is comfortable: stop and rest when you feel tired.     Drink plenty of fluids and stay on a bland diet for 2-3 days after surgery.     Do NOT drive for one week and while taking your narcotic pain medicine.     Watch for signs of infection:  Excessive warmth or bright redness around your incisions  Leakage of bloody or cloudy fluid from you incisions  Fever over 100.5    During the laparoscopic procedure that you had, gas is pumped into the abdominal cavity.  You may feel abdominal, shoulder, or rib pain for a few days due to this gas.    You will have pain medicine ordered. Take as directed.     BOWELS: constipation is a side effect of your pain meds, take a daily laxative (miralax, dulcolax, etc.) as needed to keep your bowels moving as they normally do, do not go 2-3 days without having a bowel movement.      Pain medications;   Percocet- take at least 1/2 pill every 6 hours the first 36 hours after surgery, and may take as many as 2 pills every 4 hours. After the first 36hours only take the pills as needed and stop them as soon as possible. Pain meds cause constipation

## 2025-07-31 NOTE — OP NOTE
Operative Note      Patient: Vinod Rodriguez Jr.  YOB: 2000  MRN: [id number]    Date of Procedure: 8/1/2025    Pre-Op Diagnosis Codes:      * Symptomatic cholelithiasis [K80.20]    Post-Op Diagnosis: Same       Procedure(s):  CHOLECYSTECTOMY LAPAROSCOPIC WITH CHOLANGIOGRAM ROBOTIC XI    Surgeon(s):  Malena Galloway MD    Assistant:   First Assistant: Eva White; Radha Fernandez RN  Resident: Krystina Robledo MD    Anesthesia: General    Estimated Blood Loss (mL): less than 50     Complications: None    Specimens:   ID Type Source Tests Collected by Time Destination   A : GALLBLADDER AND CONTENTS Tissue Gallbladder SURGICAL PATHOLOGY Malena Galloway MD 8/1/2025 0755        Implants:  Implant Name Type Inv. Item Serial No.  Lot No. LRB No. Used Action   CLIP INT M L POLYMER AMY LIG HEM O AMY - [id number]  CLIP INT M L POLYMER AMY LIG HEM O AMY  [employer]  N/A 3 Implanted         Drains: * No LDAs found *    Findings:  Present At Time Of Surgery (PATOS) (choose all levels that have infection present):  No infection present  Other Findings: see below    Detailed Description of Procedure:     DESCRIPTION OF PROCEDURE: This is a 25 y.o. male  increasingly symptomatic right upper quadrant epigastric pain. After being explained the risks, benefits, and alternatives of the procedure, the patient agreed to proceed. We discussed at length risks of bleeding, biliary and liver ductal injury, vascular injury and need for repeat procedures, possible need for open procedure or drainage catheter.      The patient was taken to the operating room and placed supine on the operating room table, administered general anesthesia and intubated. Once the airway was secured and the patient was adequately sedated a time-out was performed to confirm the surgical site and the patient's name.      We initially made an 8mm incision incision at the umbilicus and inserted the Veress needle. Confirmed

## 2025-07-31 NOTE — H&P
General Surgery History and Physical  [facility]    Patient's Name/Date of Birth: Vinod Rodriguez Jr. / 2000    Date: August 1, 2025     Surgeon: Malena Galloway MD    PCP: Mikayla Coulter APN     Chief Complaint: Right upper quadrant pain    HPI:   Vinod Rodriguez Jr. is a 25 y.o. male who presents for evaluation of right upper quadrant pain. Timing is intermittent, radiation to back, alleviated by npo and started several weeks ago. Denies SOB, chest pain, fever, chills, diarrhea, constipation. They have been seen by medical and emergency care professionals for the same complaints and referred for surgical evaluation.     Past Medical History:   Diagnosis Date    Arthritis     Asperger syndrome     Asthma     Hypertension     Obesity     Sigmoid diverticulitis     Sleep apnea        Past Surgical History:   Procedure Laterality Date    ADENOIDECTOMY      COLECTOMY N/A 03/15/2023    BOWEL RESECTION SIGMOID LAPAROSCOPIC ROBOTIC performed by Malena Galloway MD at [facility] OR    COLONOSCOPY N/A 07/29/2022    COLONOSCOPY performed by Malena Galloway MD at [facility] ENDOSCOPY    TONSILLECTOMY      UPPER GASTROINTESTINAL ENDOSCOPY N/A 12/30/2024    ESOPHAGOGASTRODUODENOSCOPY BIOPSY performed by Rg Gonzalez MD at [facility] ENDOSCOPY    VENTRAL HERNIA REPAIR N/A 06/10/2024    HERNIA INCISIONAL REPAIR LAPAROSCOPIC ROBOTIC XI performed by Malena Galloway MD at [facility] OR    WISDOM TOOTH EXTRACTION         Current Facility-Administered Medications   Medication Dose Route Frequency Provider Last Rate Last Admin    sodium chloride flush 0.9 % injection 5-40 mL  5-40 mL IntraVENous 2 times per day Malena Galloway MD        sodium chloride flush 0.9 % injection 5-40 mL  5-40 mL IntraVENous PRN Malena Galloway MD        0.9 % sodium chloride infusion   IntraVENous PRN Malena Galloway MD        ceFAZolin (ANCEF) 3,000 mg in sodium chloride 0.9 % 100 mL (addEASE)  3,000 mg IntraVENous On Call to OR  polydipsia/polyuria  Respiratory ROS: negative sputum changes, stridor, tachypnea or wheezing  Cardiovascular ROS: negative for - loss of consciousness, murmur or orthopnea  Gastrointestinal ROS: negative for - hematochezia or hematemesis  Genito-Urinary ROS: negative for -  genital discharge or hematuria  Musculoskeletal ROS: negative for - focal weakness, gangrene  Psych/Neuro ROS: negative for - visual or auditory hallucinations, suicidal ideation    Physical exam:   /71   Pulse 76   Temp 97 °F (36.1 °C) (Infrared)   Resp 18   Ht 1.702 m (5' 7\")   Wt (!) 163.7 kg (361 lb)   SpO2 100%   BMI 56.54 kg/m²   General appearance:  NAD, appears stated age  Head: NCAT, PERRLA, EOMI, red conjunctiva  Neck: supple, no masses, trachea midline  Lungs: Equal chest rise bilateral, no retractions, no wheezing  Heart: Reg rate  Abdomen: soft, mild epigastric tenderness. Non distended  Skin; warm and dry, no cyanosis  Gu: no cva tenderness  Extremities: atraumatic, no focal motor deficits, no open wounds  Psych: No tremor, visual hallucinations        Radiology: I reviewed relevant abdominal imaging from this admission and that available in the EMR including RUQ US. My assessment is gallbladder sludge without wall thickening      Assessment:  25 y.o. male with symptomatic cholelithiasis, RUQ pain, bloating, nausea, recurrent    Plan:  I reviewed the US performed and relevant abdominal imaging independently and entirely  I reviewed ER and medical specialist notes relating to the patients above complaints  We discussed the imaging and relevant labs  We discussed nonoperative treatment as well as medications and surgical options  I discussed mechanism of action of medications and futility of that treatment in my opinion. I discussed gallbladder function and consequences of removal at length including its interaction with the liver and pancreas and bowel and role in digestion.  Recommend to proceed OR for laparoscopic

## 2025-07-31 NOTE — PROGRESS NOTES
[facility]                                                                                                                    PRE OP INSTRUCTIONS FOR  Vinod Rodriguez .        Date: 7/31/2025    Date of surgery: 8/1/25  1030   Arrival Time: 0830  Arrive in Main Lobby in the hospital and report to the information desk to sign in.       Nothing by mouth (NPO) as instructed. May have clear liquids up to 2 hours prior to surgery. Nothing solid after midnight. Examples: water, apple juice, black coffee, plain tea    Take the following medications with a small sip of water on the morning of Surgery: Flomax, Quetiapine, Divalproex, Memantine     Diabetics may take half the evening dose of insulin but none after midnight.  If you feel symptomatic or have low blood sugar morning of surgery drink 1-2 ounces of apple juice only. If you take a weekly insulin injection _______________, stop 7 days prior to surgery. If you take _______________, stop 3-4 days prior to surgery.    Aspirin, Ibuprofen, Advil, Naproxen, other Anti-inflammatory products should be stopped before surgery as directed by your surgeon, cardiologist, or primary care Doctor. Herbal supplements and Vitamin E should be stopped five days prior.  May take Tylenol unless instructed otherwise by your surgeon.    Check with your Doctor regarding stopping Plavix, Coumadin, Lovenox, Eliquis, Effient, or other blood thinners such as, pradaxa, lixiana, xaralto and savaysa.    Do not smoke, chew tobacco, vape, or use illicit drugs and do not drink any alcoholic beverages 24 hours prior to surgery.    You may brush your teeth the morning of surgery.      You MUST make arrangements for a responsible adult, 18 and over, to take you home after your surgery. You will not be allowed to leave alone or drive yourself home.  You will need someone stay with you the first 24 hrs. Your surgery will be cancelled if you do not have a ride home or someone to

## 2025-08-01 ENCOUNTER — APPOINTMENT (OUTPATIENT)
Dept: GENERAL RADIOLOGY | Age: 25
End: 2025-08-01
Attending: SURGERY

## 2025-08-01 ENCOUNTER — ANESTHESIA (OUTPATIENT)
Dept: OPERATING ROOM | Age: 25
End: 2025-08-01

## 2025-08-01 ENCOUNTER — ANESTHESIA EVENT (OUTPATIENT)
Dept: OPERATING ROOM | Age: 25
End: 2025-08-01

## 2025-08-01 ENCOUNTER — HOSPITAL ENCOUNTER (OUTPATIENT)
Age: 25
Setting detail: OUTPATIENT SURGERY
Discharge: HOME | End: 2025-08-01
Attending: SURGERY | Admitting: SURGERY

## 2025-08-01 ENCOUNTER — HOSPITAL ENCOUNTER (EMERGENCY)
Age: 25
Discharge: HOME | End: 2025-08-01
Attending: EMERGENCY MEDICINE

## 2025-08-01 VITALS
OXYGEN SATURATION: 98 % | HEART RATE: 98 BPM | DIASTOLIC BLOOD PRESSURE: 82 MMHG | SYSTOLIC BLOOD PRESSURE: 132 MMHG | TEMPERATURE: 97.7 F | RESPIRATION RATE: 16 BRPM

## 2025-08-01 VITALS
HEART RATE: 76 BPM | OXYGEN SATURATION: 99 % | RESPIRATION RATE: 20 BRPM | HEIGHT: 67 IN | SYSTOLIC BLOOD PRESSURE: 104 MMHG | BODY MASS INDEX: 49.44 KG/M2 | DIASTOLIC BLOOD PRESSURE: 61 MMHG | WEIGHT: 315 LBS | TEMPERATURE: 96.7 F

## 2025-08-01 DIAGNOSIS — K80.20 SYMPTOMATIC CHOLELITHIASIS: Primary | ICD-10-CM

## 2025-08-01 DIAGNOSIS — T81.31XA DEHISCENCE OF OPERATIVE WOUND, INITIAL ENCOUNTER: Primary | ICD-10-CM

## 2025-08-01 PROCEDURE — 7100000001 HC PACU RECOVERY - ADDTL 15 MIN: Performed by: SURGERY

## 2025-08-01 PROCEDURE — 6360000002 HC RX W HCPCS: Performed by: NURSE ANESTHETIST, CERTIFIED REGISTERED

## 2025-08-01 PROCEDURE — 3700000001 HC ADD 15 MINUTES (ANESTHESIA): Performed by: SURGERY

## 2025-08-01 PROCEDURE — C1889 IMPLANT/INSERT DEVICE, NOC: HCPCS | Performed by: SURGERY

## 2025-08-01 PROCEDURE — 2580000003 HC RX 258: Performed by: NURSE ANESTHETIST, CERTIFIED REGISTERED

## 2025-08-01 PROCEDURE — 6360000002 HC RX W HCPCS: Performed by: SURGERY

## 2025-08-01 PROCEDURE — 7100000000 HC PACU RECOVERY - FIRST 15 MIN: Performed by: SURGERY

## 2025-08-01 PROCEDURE — 2580000003 HC RX 258: Performed by: SURGERY

## 2025-08-01 PROCEDURE — 7100000010 HC PHASE II RECOVERY - FIRST 15 MIN: Performed by: SURGERY

## 2025-08-01 PROCEDURE — S2900 ROBOTIC SURGICAL SYSTEM: HCPCS | Performed by: SURGERY

## 2025-08-01 PROCEDURE — 2500000003 HC RX 250 WO HCPCS: Performed by: NURSE ANESTHETIST, CERTIFIED REGISTERED

## 2025-08-01 PROCEDURE — 6360000004 HC RX CONTRAST MEDICATION: Performed by: SURGERY

## 2025-08-01 PROCEDURE — 3700000000 HC ANESTHESIA ATTENDED CARE: Performed by: SURGERY

## 2025-08-01 PROCEDURE — 3600000019 HC SURGERY ROBOT ADDTL 15MIN: Performed by: SURGERY

## 2025-08-01 PROCEDURE — 88304 TISSUE EXAM BY PATHOLOGIST: CPT

## 2025-08-01 PROCEDURE — 7100000011 HC PHASE II RECOVERY - ADDTL 15 MIN: Performed by: SURGERY

## 2025-08-01 PROCEDURE — 12001 RPR S/N/AX/GEN/TRNK 2.5CM/<: CPT

## 2025-08-01 PROCEDURE — 47563 LAPARO CHOLECYSTECTOMY/GRAPH: CPT | Performed by: SURGERY

## 2025-08-01 PROCEDURE — 99282 EMERGENCY DEPT VISIT SF MDM: CPT

## 2025-08-01 PROCEDURE — 2500000003 HC RX 250 WO HCPCS: Performed by: SURGERY

## 2025-08-01 PROCEDURE — 2709999900 HC NON-CHARGEABLE SUPPLY: Performed by: SURGERY

## 2025-08-01 PROCEDURE — 3600000009 HC SURGERY ROBOT BASE: Performed by: SURGERY

## 2025-08-01 PROCEDURE — C1894 INTRO/SHEATH, NON-LASER: HCPCS | Performed by: SURGERY

## 2025-08-01 PROCEDURE — 2720000010 HC SURG SUPPLY STERILE: Performed by: SURGERY

## 2025-08-01 DEVICE — CLIP INT M L POLYMER LOK LIG HEM O LOK: Type: IMPLANTABLE DEVICE | Site: BILE DUCT | Status: FUNCTIONAL

## 2025-08-01 RX ORDER — MIDAZOLAM HYDROCHLORIDE 1 MG/ML
2 INJECTION, SOLUTION INTRAMUSCULAR; INTRAVENOUS
Status: DISCONTINUED | OUTPATIENT
Start: 2025-08-01 | End: 2025-08-01 | Stop reason: HOSPADM

## 2025-08-01 RX ORDER — SODIUM CHLORIDE 9 MG/ML
INJECTION, SOLUTION INTRAVENOUS PRN
Status: DISCONTINUED | OUTPATIENT
Start: 2025-08-01 | End: 2025-08-01 | Stop reason: HOSPADM

## 2025-08-01 RX ORDER — KETOROLAC TROMETHAMINE 30 MG/ML
30 INJECTION, SOLUTION INTRAMUSCULAR; INTRAVENOUS ONCE
Status: DISCONTINUED | OUTPATIENT
Start: 2025-08-01 | End: 2025-08-01 | Stop reason: HOSPADM

## 2025-08-01 RX ORDER — FENTANYL CITRATE 0.05 MG/ML
25 INJECTION, SOLUTION INTRAMUSCULAR; INTRAVENOUS EVERY 5 MIN PRN
Status: DISCONTINUED | OUTPATIENT
Start: 2025-08-01 | End: 2025-08-01 | Stop reason: HOSPADM

## 2025-08-01 RX ORDER — LABETALOL HYDROCHLORIDE 5 MG/ML
10 INJECTION, SOLUTION INTRAVENOUS
Status: DISCONTINUED | OUTPATIENT
Start: 2025-08-01 | End: 2025-08-01 | Stop reason: HOSPADM

## 2025-08-01 RX ORDER — ROCURONIUM BROMIDE 10 MG/ML
INJECTION, SOLUTION INTRAVENOUS
Status: DISCONTINUED | OUTPATIENT
Start: 2025-08-01 | End: 2025-08-01 | Stop reason: SDUPTHER

## 2025-08-01 RX ORDER — DEXAMETHASONE SODIUM PHOSPHATE 10 MG/ML
INJECTION, SOLUTION INTRAMUSCULAR; INTRAVENOUS
Status: DISCONTINUED | OUTPATIENT
Start: 2025-08-01 | End: 2025-08-01 | Stop reason: SDUPTHER

## 2025-08-01 RX ORDER — ONDANSETRON 2 MG/ML
INJECTION INTRAMUSCULAR; INTRAVENOUS
Status: DISCONTINUED | OUTPATIENT
Start: 2025-08-01 | End: 2025-08-01 | Stop reason: SDUPTHER

## 2025-08-01 RX ORDER — SODIUM CHLORIDE 0.9 % (FLUSH) 0.9 %
5-40 SYRINGE (ML) INJECTION PRN
Status: DISCONTINUED | OUTPATIENT
Start: 2025-08-01 | End: 2025-08-01 | Stop reason: HOSPADM

## 2025-08-01 RX ORDER — IOPAMIDOL 510 MG/ML
INJECTION, SOLUTION INTRAVASCULAR PRN
Status: DISCONTINUED | OUTPATIENT
Start: 2025-08-01 | End: 2025-08-01 | Stop reason: ALTCHOICE

## 2025-08-01 RX ORDER — DIPHENHYDRAMINE HYDROCHLORIDE 50 MG/ML
INJECTION, SOLUTION INTRAMUSCULAR; INTRAVENOUS
Status: DISCONTINUED | OUTPATIENT
Start: 2025-08-01 | End: 2025-08-01 | Stop reason: SDUPTHER

## 2025-08-01 RX ORDER — IPRATROPIUM BROMIDE AND ALBUTEROL SULFATE 2.5; .5 MG/3ML; MG/3ML
1 SOLUTION RESPIRATORY (INHALATION)
Status: DISCONTINUED | OUTPATIENT
Start: 2025-08-01 | End: 2025-08-01 | Stop reason: HOSPADM

## 2025-08-01 RX ORDER — DEXMEDETOMIDINE HYDROCHLORIDE 100 UG/ML
INJECTION, SOLUTION INTRAVENOUS
Status: DISCONTINUED | OUTPATIENT
Start: 2025-08-01 | End: 2025-08-01 | Stop reason: SDUPTHER

## 2025-08-01 RX ORDER — SODIUM CHLORIDE 0.9 % (FLUSH) 0.9 %
5-40 SYRINGE (ML) INJECTION EVERY 12 HOURS SCHEDULED
Status: DISCONTINUED | OUTPATIENT
Start: 2025-08-01 | End: 2025-08-01 | Stop reason: HOSPADM

## 2025-08-01 RX ORDER — OXYCODONE AND ACETAMINOPHEN 5; 325 MG/1; MG/1
1 TABLET ORAL EVERY 6 HOURS PRN
Qty: 12 TABLET | Refills: 0 | Status: SHIPPED | OUTPATIENT
Start: 2025-08-01 | End: 2025-08-04

## 2025-08-01 RX ORDER — BUPIVACAINE HYDROCHLORIDE AND EPINEPHRINE 2.5; 5 MG/ML; UG/ML
INJECTION, SOLUTION EPIDURAL; INFILTRATION; INTRACAUDAL; PERINEURAL PRN
Status: DISCONTINUED | OUTPATIENT
Start: 2025-08-01 | End: 2025-08-01 | Stop reason: ALTCHOICE

## 2025-08-01 RX ORDER — GLYCOPYRROLATE 0.2 MG/ML
INJECTION INTRAMUSCULAR; INTRAVENOUS
Status: DISCONTINUED | OUTPATIENT
Start: 2025-08-01 | End: 2025-08-01 | Stop reason: SDUPTHER

## 2025-08-01 RX ORDER — FENTANYL CITRATE 50 UG/ML
INJECTION, SOLUTION INTRAMUSCULAR; INTRAVENOUS
Status: DISCONTINUED | OUTPATIENT
Start: 2025-08-01 | End: 2025-08-01 | Stop reason: SDUPTHER

## 2025-08-01 RX ORDER — MIDAZOLAM HYDROCHLORIDE 1 MG/ML
INJECTION, SOLUTION INTRAMUSCULAR; INTRAVENOUS
Status: DISCONTINUED | OUTPATIENT
Start: 2025-08-01 | End: 2025-08-01 | Stop reason: SDUPTHER

## 2025-08-01 RX ORDER — MEPERIDINE HYDROCHLORIDE 25 MG/ML
12.5 INJECTION INTRAMUSCULAR; INTRAVENOUS; SUBCUTANEOUS EVERY 5 MIN PRN
Status: DISCONTINUED | OUTPATIENT
Start: 2025-08-01 | End: 2025-08-01 | Stop reason: HOSPADM

## 2025-08-01 RX ORDER — NEOSTIGMINE METHYLSULFATE 1 MG/ML
INJECTION, SOLUTION INTRAVENOUS
Status: DISCONTINUED | OUTPATIENT
Start: 2025-08-01 | End: 2025-08-01 | Stop reason: SDUPTHER

## 2025-08-01 RX ORDER — SODIUM CHLORIDE, SODIUM LACTATE, POTASSIUM CHLORIDE, CALCIUM CHLORIDE 600; 310; 30; 20 MG/100ML; MG/100ML; MG/100ML; MG/100ML
INJECTION, SOLUTION INTRAVENOUS CONTINUOUS
Status: DISCONTINUED | OUTPATIENT
Start: 2025-08-01 | End: 2025-08-01 | Stop reason: HOSPADM

## 2025-08-01 RX ORDER — PROPOFOL 10 MG/ML
INJECTION, EMULSION INTRAVENOUS
Status: DISCONTINUED | OUTPATIENT
Start: 2025-08-01 | End: 2025-08-01 | Stop reason: SDUPTHER

## 2025-08-01 RX ORDER — LIDOCAINE HYDROCHLORIDE 20 MG/ML
INJECTION, SOLUTION INTRAVENOUS
Status: DISCONTINUED | OUTPATIENT
Start: 2025-08-01 | End: 2025-08-01 | Stop reason: SDUPTHER

## 2025-08-01 RX ORDER — DIPHENHYDRAMINE HYDROCHLORIDE 50 MG/ML
12.5 INJECTION, SOLUTION INTRAMUSCULAR; INTRAVENOUS
Status: DISCONTINUED | OUTPATIENT
Start: 2025-08-01 | End: 2025-08-01 | Stop reason: HOSPADM

## 2025-08-01 RX ORDER — PROCHLORPERAZINE EDISYLATE 5 MG/ML
5 INJECTION INTRAMUSCULAR; INTRAVENOUS
Status: DISCONTINUED | OUTPATIENT
Start: 2025-08-01 | End: 2025-08-01 | Stop reason: HOSPADM

## 2025-08-01 RX ORDER — HYDRALAZINE HYDROCHLORIDE 20 MG/ML
10 INJECTION INTRAMUSCULAR; INTRAVENOUS
Status: DISCONTINUED | OUTPATIENT
Start: 2025-08-01 | End: 2025-08-01 | Stop reason: HOSPADM

## 2025-08-01 RX ORDER — DROPERIDOL 2.5 MG/ML
0.62 INJECTION, SOLUTION INTRAMUSCULAR; INTRAVENOUS
Status: DISCONTINUED | OUTPATIENT
Start: 2025-08-01 | End: 2025-08-01 | Stop reason: HOSPADM

## 2025-08-01 RX ORDER — SODIUM CHLORIDE 9 MG/ML
INJECTION, SOLUTION INTRAVENOUS
Status: DISCONTINUED | OUTPATIENT
Start: 2025-08-01 | End: 2025-08-01 | Stop reason: SDUPTHER

## 2025-08-01 RX ORDER — KETOROLAC TROMETHAMINE 30 MG/ML
INJECTION, SOLUTION INTRAMUSCULAR; INTRAVENOUS
Status: DISCONTINUED | OUTPATIENT
Start: 2025-08-01 | End: 2025-08-01 | Stop reason: SDUPTHER

## 2025-08-01 RX ADMIN — MIDAZOLAM 2 MG: 1 INJECTION INTRAMUSCULAR; INTRAVENOUS at 07:01

## 2025-08-01 RX ADMIN — ROCURONIUM BROMIDE 40 MG: 10 SOLUTION INTRAVENOUS at 07:10

## 2025-08-01 RX ADMIN — FENTANYL CITRATE 50 MCG: 50 INJECTION, SOLUTION INTRAMUSCULAR; INTRAVENOUS at 07:18

## 2025-08-01 RX ADMIN — ONDANSETRON 4 MG: 2 INJECTION INTRAMUSCULAR; INTRAVENOUS at 07:20

## 2025-08-01 RX ADMIN — LIDOCAINE HYDROCHLORIDE 20 MG: 20 INJECTION, SOLUTION INTRAVENOUS at 08:11

## 2025-08-01 RX ADMIN — DEXMEDETOMIDINE HYDROCHLORIDE 4 MCG: 100 INJECTION, SOLUTION INTRAVENOUS at 07:03

## 2025-08-01 RX ADMIN — HYDROMORPHONE HYDROCHLORIDE 0.5 MG: 1 INJECTION, SOLUTION INTRAMUSCULAR; INTRAVENOUS; SUBCUTANEOUS at 08:11

## 2025-08-01 RX ADMIN — DEXMEDETOMIDINE HYDROCHLORIDE 4 MCG: 100 INJECTION, SOLUTION INTRAVENOUS at 07:18

## 2025-08-01 RX ADMIN — HYDROMORPHONE HYDROCHLORIDE 0.5 MG: 1 INJECTION, SOLUTION INTRAMUSCULAR; INTRAVENOUS; SUBCUTANEOUS at 07:32

## 2025-08-01 RX ADMIN — FENTANYL CITRATE 150 MCG: 50 INJECTION, SOLUTION INTRAMUSCULAR; INTRAVENOUS at 07:09

## 2025-08-01 RX ADMIN — HYDROMORPHONE HYDROCHLORIDE 0.5 MG: 1 INJECTION, SOLUTION INTRAMUSCULAR; INTRAVENOUS; SUBCUTANEOUS at 08:25

## 2025-08-01 RX ADMIN — SODIUM CHLORIDE 3000 MG: 9 INJECTION, SOLUTION INTRAVENOUS at 07:07

## 2025-08-01 RX ADMIN — DEXAMETHASONE SODIUM PHOSPHATE 10 MG: 10 INJECTION, SOLUTION INTRAMUSCULAR; INTRAVENOUS at 07:16

## 2025-08-01 RX ADMIN — LIDOCAINE HYDROCHLORIDE 80 MG: 20 INJECTION, SOLUTION INTRAVENOUS at 07:09

## 2025-08-01 RX ADMIN — SODIUM CHLORIDE: 9 INJECTION, SOLUTION INTRAVENOUS at 08:12

## 2025-08-01 RX ADMIN — GLYCOPYRROLATE 0.4 MG: 0.2 INJECTION, SOLUTION INTRAMUSCULAR; INTRAVENOUS at 08:11

## 2025-08-01 RX ADMIN — ROCURONIUM BROMIDE 10 MG: 10 SOLUTION INTRAVENOUS at 07:33

## 2025-08-01 RX ADMIN — Medication 3 MG: at 08:11

## 2025-08-01 RX ADMIN — HYDROMORPHONE HYDROCHLORIDE 0.5 MG: 1 INJECTION, SOLUTION INTRAMUSCULAR; INTRAVENOUS; SUBCUTANEOUS at 08:14

## 2025-08-01 RX ADMIN — DIPHENHYDRAMINE HYDROCHLORIDE 12.5 MG: 50 INJECTION, SOLUTION INTRAMUSCULAR; INTRAVENOUS at 07:02

## 2025-08-01 RX ADMIN — SODIUM CHLORIDE, POTASSIUM CHLORIDE, SODIUM LACTATE AND CALCIUM CHLORIDE: 600; 310; 30; 20 INJECTION, SOLUTION INTRAVENOUS at 06:58

## 2025-08-01 RX ADMIN — DEXMEDETOMIDINE HYDROCHLORIDE 8 MCG: 100 INJECTION, SOLUTION INTRAVENOUS at 07:09

## 2025-08-01 RX ADMIN — PROPOFOL 150 MG: 10 INJECTION, EMULSION INTRAVENOUS at 07:10

## 2025-08-01 RX ADMIN — KETOROLAC TROMETHAMINE 30 MG: 30 INJECTION, SOLUTION INTRAMUSCULAR; INTRAVENOUS at 08:11

## 2025-08-01 ASSESSMENT — PAIN - FUNCTIONAL ASSESSMENT
PAIN_FUNCTIONAL_ASSESSMENT: 0-10

## 2025-08-01 ASSESSMENT — PAIN DESCRIPTION - DESCRIPTORS
DESCRIPTORS: DISCOMFORT

## 2025-08-01 ASSESSMENT — ENCOUNTER SYMPTOMS: SHORTNESS OF BREATH: 1

## 2025-08-01 NOTE — ANESTHESIA PRE PROCEDURE
Department of Anesthesiology  Preprocedure Note       Name:  Vinod Rodriguez Jr.   Age:  25 y.o.  :  2000                                          MRN:  [id number]         Date:  2025      Surgeon: Surgeon(s):  Malena Galloway MD    Procedure: Procedure(s):  CHOLECYSTECTOMY LAPAROSCOPIC CHOLANGIOGRAM ROBOTIC XI    Medications prior to admission:   Prior to Admission medications    Medication Sig Start Date End Date Taking? Authorizing Provider   famotidine (PEPCID) 20 MG tablet Take 1 tablet by mouth 2 times daily 25   Jordan Moran MD   ondansetron (ZOFRAN-ODT) 4 MG disintegrating tablet Take 1 tablet by mouth 3 times daily as needed for Nausea or Vomiting 25   Jordan Moran MD   Dextromethorphan-guaiFENesin (MUCINEX DM MAXIMUM STRENGTH)  MG TB12 Take 1 tablet by mouth every 12 hours as needed (cough and congestion) 25   Kirill Martinez DO   ibuprofen (ADVIL;MOTRIN) 600 MG tablet Take 1 tablet by mouth 3 times daily as needed for Pain 25   Laura Lazcano, APRN - CNP   Multiple Vitamin (DAILY-PREM MULTIVITAMIN) TABS Take 1 tablet by mouth daily 24   Keenan Suresh MD   TOPAMAX 50 MG tablet Take 1 tablet by mouth daily For headaches 23   Keenan Suresh MD   albuterol sulfate HFA (PROVENTIL;VENTOLIN;PROAIR) 108 (90 Base) MCG/ACT inhaler Inhale 2 puffs into the lungs every 6 hours as needed for Wheezing or Shortness of Breath    Keenan Suresh MD   lisinopril (PRINIVIL;ZESTRIL) 10 MG tablet Take 1 tablet by mouth daily 22   Keenan Suresh MD   cetirizine (ZYRTEC) 10 MG tablet Take 1 tablet by mouth daily 22   Keenan Suresh MD   vitamin D (ERGOCALCIFEROL) 1.25 MG (30485 UT) CAPS capsule Take 1 capsule by mouth once a week 21   Keenan Suresh MD       Current medications:    Current Facility-Administered Medications   Medication Dose Route Frequency Provider Last Rate Last Admin    sodium chloride

## 2025-08-01 NOTE — ANESTHESIA POSTPROCEDURE EVALUATION
Department of Anesthesiology  Postprocedure Note    Patient: Vinod Rodriguez Jr.  MRN: [id number]  YOB: 2000  Date of evaluation: 8/1/2025    Procedure Summary       Date: 08/01/25 Room / Location: [address]    Anesthesia Start: 0652 Anesthesia Stop: 0826    Procedure: CHOLECYSTECTOMY LAPAROSCOPIC WITH CHOLANGIOGRAM ROBOTIC XI (Abdomen) Diagnosis:       Symptomatic cholelithiasis      (Symptomatic cholelithiasis [K80.20])    Surgeons: Malena Galloway MD Responsible Provider: Cesar Cordova DO    Anesthesia Type: general ASA Status: 3            Anesthesia Type: No value filed.    Hilda Phase I: Hilda Score: 10    Hilda Phase II: Hilda Score: 10    Anesthesia Post Evaluation    Patient location during evaluation: PACU  Patient participation: complete - patient participated  Level of consciousness: awake and alert  Pain score: 0  Airway patency: patent  Nausea & Vomiting: no nausea and no vomiting  Cardiovascular status: blood pressure returned to baseline and hemodynamically stable  Respiratory status: acceptable  Hydration status: stable  Pain management: adequate    No notable events documented.

## 2025-08-02 NOTE — DISCHARGE INSTRUCTIONS
Return if wound opens more any abdominal pain pus redness or swelling or significant bleeding follow-up with your surgeon as soon as possible

## 2025-08-02 NOTE — ED PROVIDER NOTES
HPI:  8/1/25,   Time: 10:05 PM EDT         Vinod Rodriguez Jr. is a 25 y.o. male presenting to the ED for patient just had surgery today and after he coughed one of the one of the laparoscopic lesions open slightly no active bleeding no pus no redness no abdominal pain I did oral I did was cleaned with Shur-Clens and put a small amount of Dermabond on it there was no no other lesions look fine it did not look infected it just happened today he has been passing gas he is not vomiting his abdomen is benign I told to watch for pus redness or swelling which she has not at this time, beginning an hour and a half ago.  The complaint has been persistent, mild in severity, and worsened by nothing.      ROS:   Pertinent positives and negatives are stated within HPI, all other systems reviewed and are negative.  --------------------------------------------- PAST HISTORY ---------------------------------------------  Past Medical History:  has a past medical history of Arthritis, Asperger syndrome, Asthma, Hypertension, Obesity, Sigmoid diverticulitis, and Sleep apnea.    Past Surgical History:  has a past surgical history that includes Tonsillectomy; Adenoidectomy; [place] tooth extraction; Colonoscopy (N/A, 07/29/2022); colectomy (N/A, 03/15/2023); ventral hernia repair (N/A, 06/10/2024); and Upper gastrointestinal endoscopy (N/A, 12/30/2024).    Social History:  reports that he has never smoked. He has never used smokeless tobacco. He reports current alcohol use of about 1.0 standard drink of alcohol per week. He reports that he does not use drugs.    Family History: family history is not on file.     The patient’s home medications have been reviewed.    Allergies: Patient has no known allergies.    -------------------------------------------------- RESULTS -------------------------------------------------  All laboratory and radiology results have been personally reviewed by myself   LABS:  No results found for this visit on

## 2025-08-07 LAB — SURGICAL PATHOLOGY REPORT: NORMAL

## 2025-08-12 ENCOUNTER — OFFICE VISIT (OUTPATIENT)
Dept: SURGERY | Age: 25
End: 2025-08-12

## 2025-08-12 VITALS
HEIGHT: 67 IN | RESPIRATION RATE: 18 BRPM | SYSTOLIC BLOOD PRESSURE: 124 MMHG | WEIGHT: 315 LBS | TEMPERATURE: 98.1 F | HEART RATE: 86 BPM | BODY MASS INDEX: 49.44 KG/M2 | DIASTOLIC BLOOD PRESSURE: 79 MMHG

## 2025-08-12 DIAGNOSIS — Z90.49 S/P LAPAROSCOPIC CHOLECYSTECTOMY: Primary | ICD-10-CM

## 2025-08-12 PROCEDURE — 99024 POSTOP FOLLOW-UP VISIT: CPT | Performed by: SURGERY

## (undated) DEVICE — STANDARD HYPODERMIC NEEDLE,POLYPROPYLENE HUB: Brand: MONOJECT

## (undated) DEVICE — GOWN,SIRUS,NONRNF,SETINSLV,XL,20/CS: Brand: MEDLINE

## (undated) DEVICE — INSUFFLATION NEEDLE TO ESTABLISH PNEUMOPERITONEUM.: Brand: INSUFFLATION NEEDLE

## (undated) DEVICE — REDUCER: Brand: ENDOWRIST

## (undated) DEVICE — 6 X 9  1.75MIL 4-WALL LABGUARD: Brand: MINIGRIP COMMERCIAL LLC

## (undated) DEVICE — SEAL

## (undated) DEVICE — SHEET PT TRANSFER 80X40 IN LAT AIR NYL GRY COMFORT GLIDE

## (undated) DEVICE — KIT BEDSIDE REVITAL OX 500ML

## (undated) DEVICE — ADAPTER CLEANING PORPOISE CLEANING

## (undated) DEVICE — OBTURATOR ROBOTIC DIA8MM STD OPT BLDELSS

## (undated) DEVICE — DOUBLE BASIN SET: Brand: MEDLINE INDUSTRIES, INC.

## (undated) DEVICE — MEDIA CONTRAST ISOVUE 250 100ML

## (undated) DEVICE — ELECTRO LUBE IS A SINGLE PATIENT USE DEVICE THAT IS INTENDED TO BE USED ON ELECTROSURGICAL ELECTRODES TO REDUCE STICKING.: Brand: KEY SURGICAL ELECTRO LUBE

## (undated) DEVICE — MANIFOLD SUCT 4 PRT 2 CANSTR FLTR DISP NEPTUNE 2

## (undated) DEVICE — IRRIGATOR SURG DIA8MM SUCT ENDOWRIST

## (undated) DEVICE — APPLICATOR MEDICATED 26 CC SOLUTION HI LT ORNG CHLORAPREP

## (undated) DEVICE — SYRINGE MED 10ML LUERLOCK TIP W/O SFTY DISP

## (undated) DEVICE — MARKER,SKIN,WI/RULER AND LABELS: Brand: MEDLINE

## (undated) DEVICE — SUTURE V-LOC 180 SZ 0 L12IN ABSRB GRN L37MM GS-21 1/2 CIR VLOCL0316

## (undated) DEVICE — CLOTH SURG PREP PREOPERATIVE CHLORHEXIDINE GLUC 2% READYPREP

## (undated) DEVICE — YANKAUER,BULB TIP,W/O VENT,RIGID,STERILE: Brand: MEDLINE

## (undated) DEVICE — GARMENT COMPR M FOR 12-18IN CALF INTMIT SGL BLDR HEMO-FORCE

## (undated) DEVICE — 4-PORT MANIFOLD: Brand: NEPTUNE 2

## (undated) DEVICE — SOL IRR SOD CHL 0.9% TITAN XL CNTNR 3000ML

## (undated) DEVICE — SEAL UNIVERSAL 5-12MM

## (undated) DEVICE — FORCEPS SURG DIA8MM PROGRASP ENDOSWITCH

## (undated) DEVICE — GOWN,SIRUS,FABRNF,XL,20/CS: Brand: MEDLINE

## (undated) DEVICE — ARM DRAPE

## (undated) DEVICE — COLUMN DRAPE

## (undated) DEVICE — SHEET,DRAPE,70X100,STERILE: Brand: MEDLINE

## (undated) DEVICE — GARMENT,MEDLINE,DVT,INT,CALF,MED, GEN2: Brand: MEDLINE

## (undated) DEVICE — CANNULA SEAL

## (undated) DEVICE — DRAPE SURG ST W40XL58IN STD POLYPR HALF DISP

## (undated) DEVICE — SOLUTION IRRIG 1000ML 0.9% SOD CHL USP POUR PLAS BTL

## (undated) DEVICE — DRAPE ARM W21XH19XL10.5IN DA VINCI XI INTUITIVE SURGICAL

## (undated) DEVICE — Device

## (undated) DEVICE — 40586 ADVANCED TRENDELENBURG POSITIONING KIT: Brand: 40586 ADVANCED TRENDELENBURG POSITIONING KIT

## (undated) DEVICE — COVER,LIGHT HANDLE,FLX,1/PK: Brand: MEDLINE INDUSTRIES, INC.

## (undated) DEVICE — SUTURE DEV SZ 0 L6IN N ABSORBABLE

## (undated) DEVICE — SYRINGE 20ML LL S/C 50

## (undated) DEVICE — GOWN ISOLATN REG YEL M WT MULTIPLY SIDETIE LEV 2

## (undated) DEVICE — TIP-UP FENESTRATED GRASPER: Brand: ENDOWRIST

## (undated) DEVICE — BLADE ES ELASTOMERIC COAT INSUL DURABLE BEND UPTO 90DEG

## (undated) DEVICE — SUTURE BARB NON ABSORBABLE V LOC PBT BLU SZ 0 LEN 9 IN GS 21 VLOCN0346

## (undated) DEVICE — GOWN SURG 2XL L56IN XLN BLU POLYETH SMS REINF BRTH FLM SL

## (undated) DEVICE — SOLUTION ELECTROCAUTERY 4ML YEL BRN AMPHIPHILIC PHOSLIP

## (undated) DEVICE — ANTI-FOG SOLUTION WITH FOAM PAD: Brand: DEVON

## (undated) DEVICE — SYSTEM ACCS L100MM DIA12MM 1ST ENTRY ADV FIX KII FIOS

## (undated) DEVICE — STAPLER 60 RELOAD GREEN: Brand: SUREFORM

## (undated) DEVICE — TUBING, SUCTION, 1/4" X 10', STRAIGHT: Brand: MEDLINE

## (undated) DEVICE — SCISSORS SURG DIA8MM MPLR CRV ENDOWRIST

## (undated) DEVICE — BLADELESS OBTURATOR: Brand: WECK VISTA

## (undated) DEVICE — TOWEL SURG W17XL27IN BLU COT STD PREWASHED STERILE 6 PER PK

## (undated) DEVICE — BLOCK BITE 60FR CAREGUARD

## (undated) DEVICE — CONTAINER SPEC 480ML CLR POLYSTYR 10% NEUT BUFF FRMLN ZN

## (undated) DEVICE — FORCEPS BX L240CM JAW DIA2.8MM L CAP W/ NDL MIC MESH TOOTH

## (undated) DEVICE — TROCAR LAP L100MM DIA12MM Z THRD 1ST ENTRY SYS KII FIOS

## (undated) DEVICE — PACK SURG LAP CHOLE CUSTOM

## (undated) DEVICE — PACK BSIN W54XL54IN 2OZ CLR BASIC DBL INCLUDE 2 EMSIS BSIN 2

## (undated) DEVICE — LUBRICANT SURG JELLY ST BACTER TUBE 4.25OZ

## (undated) DEVICE — CONTAINER SPEC COLL 960ML POLYPR TRIANG GRAD INTAKE/OUTPUT

## (undated) DEVICE — FILTER SMK EVAC LAP 10 FT CONT SUCTION PLUMEPORT ACTIV

## (undated) DEVICE — SUTURE V-LOC 180 SZ 0 L9IN ABSRB GRN GS-21 L37MM 1/2 CIR VLOCL0346

## (undated) DEVICE — NEEDLE HYPO 25GA L1.5IN BLU POLYPR HUB S STL REG BVL STR

## (undated) DEVICE — ANCHOR TISSUE RETRIEVAL SYSTEM, BAG SIZE 175 ML, PORT SIZE 10 MM: Brand: ANCHOR TISSUE RETRIEVAL SYSTEM

## (undated) DEVICE — STAPLER 45: Brand: ENDOWRIST

## (undated) DEVICE — SPONGE GZ 4IN 4IN 4 PLY N WVN AVANT

## (undated) DEVICE — MICRO TIP WIPE: Brand: DEVON

## (undated) DEVICE — TOTAL TRAY, 16FR 10ML SIL FOLEY, URN: Brand: MEDLINE

## (undated) DEVICE — CORD,CAUTERY,BIPOLAR,STERILE: Brand: MEDLINE

## (undated) DEVICE — TOWEL,OR,DSP,ST,BLUE,STD,6/PK,12PK/CS: Brand: MEDLINE

## (undated) DEVICE — Device: Brand: DEFENDO VALVE AND CONNECTOR KIT

## (undated) DEVICE — PLUMEPORT ACTIV LAPAROSCOPIC SMOKE FILTRATION DEVICE: Brand: PLUMEPORT ACTIVE

## (undated) DEVICE — GLOVE ORANGE PI 7 1/2   MSG9075

## (undated) DEVICE — COVER LT HNDL GRN PLAS FLX SFT

## (undated) DEVICE — ADHESIVE SKIN CLOSURE TOP 0.8 CC PREM PUR LIQUIBAND RAPID LF

## (undated) DEVICE — DRAPE C ARM W41XL65IN UNIV W/ CLP AND RUBBERBAND

## (undated) DEVICE — SHEET DRAPE FULL 70X100

## (undated) DEVICE — SET TUBING HI FLO FOR PNEUMOSURE INSUFFLATR

## (undated) DEVICE — MARKER SURG SKIN GENTIAN VLT REG TIP W/ 6IN RUL AND BLNK DYNJSM02

## (undated) DEVICE — SOLUTION IRRIG 3000ML 0.9% SOD CHL USP UROMATIC PLAS CONT

## (undated) DEVICE — BASIC DOUBLE BASIN 2-LF: Brand: MEDLINE INDUSTRIES, INC.

## (undated) DEVICE — STAPLER SHEATH: Brand: ENDOWRIST

## (undated) DEVICE — LIQUIBAND RAPID ADHESIVE 36/CS 0.8ML: Brand: MEDLINE

## (undated) DEVICE — GLOVE SURG SZ 65 THK91MIL LTX FREE SYN POLYISOPRENE

## (undated) DEVICE — DRAPE FULL SHT 70X100 AURORA

## (undated) DEVICE — PROGRASP FORCEPS: Brand: ENDOWRIST

## (undated) DEVICE — STAPLER-OBTURATOR DAVINCI XI 12MM

## (undated) DEVICE — SYRINGE MED 10ML TRNSLUC BRL PLUNG BLK MRK POLYPR CTRL

## (undated) DEVICE — ELECTRODE PT RET AD L9FT HI MOIST COND ADH HYDRGEL CORDED

## (undated) DEVICE — SHEET,DRAPE,40X58,STERILE: Brand: MEDLINE

## (undated) DEVICE — TUBING SCTION CONN 1/4X10 RIB

## (undated) DEVICE — CATHETER CHOLGM 4FR L50CM MIXTER ENDOSCP

## (undated) DEVICE — SOLUTION IRRIG 1000ML STRL H2O USP PLAS POUR BTL

## (undated) DEVICE — LAPAROSCOPIC WIRE L HK 45 CM

## (undated) DEVICE — DRAPE EQUIP CLMN ROBOTIC DISP DA VINCI XI

## (undated) DEVICE — BLADELESS OBTURATOR, LONG: Brand: WECK VISTA

## (undated) DEVICE — BASIC SINGLE BASIN 1-LF: Brand: MEDLINE INDUSTRIES, INC.

## (undated) DEVICE — VALVE SUCTION AIR H2O HYDR H2O JET CONN STRL ORCA POD + DISP

## (undated) DEVICE — HYDROPHILIC COATED RED RUBBER URETHRAL CATHETER, SMOOTH ROUNDED TIP, 20 FR (6.7 MM): Brand: DOVER

## (undated) DEVICE — MASK,FACE,MAXFLUIDPROTECT,SHIELD/ERLPS: Brand: MEDLINE

## (undated) DEVICE — SPECIMAN BG TISS RETRV SYS OPN AUTOMATICALLY 10 MM TRS100SB2] CONMED CORP]

## (undated) DEVICE — COVER TIP DIA8MM DA VINCI SI XI X ENDOWRIST

## (undated) DEVICE — Device: Brand: INSTRUSAFE

## (undated) DEVICE — SMALL GRASPING RETRACTOR: Brand: ENDOWRIST

## (undated) DEVICE — TIP COVER ACCESSORY

## (undated) DEVICE — GOWN,SIRUS,POLYRNF,BRTHSLV,XLN/XXL,18/CS: Brand: MEDLINE

## (undated) DEVICE — COVER TBL W44XL90IN STD POLYPR REINF DISP CONVERTORS

## (undated) DEVICE — READY WET SKIN SCRUB TRAY-LF: Brand: MEDLINE INDUSTRIES, INC.

## (undated) DEVICE — KENDALL 450 SERIES MONITORING FOAM ELECTRODE - RECTANGULAR SHAPE ( 3/PK): Brand: KENDALL

## (undated) DEVICE — SUCTION IRRIGATOR: Brand: ENDOWRIST

## (undated) DEVICE — [HIGH FLOW INSUFFLATOR,  DO NOT USE IF PACKAGE IS DAMAGED,  KEEP DRY,  KEEP AWAY FROM SUNLIGHT,  PROTECT FROM HEAT AND RADIOACTIVE SOURCES.]: Brand: PNEUMOSURE

## (undated) DEVICE — SOLUTION IRRIG 1000ML 09% SOD CHL USP PIC PLAS CONTAINER

## (undated) DEVICE — COVER,TABLE,44X90,STERILE: Brand: MEDLINE

## (undated) DEVICE — PAD POS HUMBLES LAPWRAP

## (undated) DEVICE — APPLIER CLP M-L DIA8MM ENDOWRIST

## (undated) DEVICE — NEEDLE INSUF 13GA L120MM